# Patient Record
Sex: MALE | Race: WHITE | NOT HISPANIC OR LATINO | Employment: OTHER | URBAN - METROPOLITAN AREA
[De-identification: names, ages, dates, MRNs, and addresses within clinical notes are randomized per-mention and may not be internally consistent; named-entity substitution may affect disease eponyms.]

---

## 2020-01-29 ENCOUNTER — TRANSFERRED RECORDS (OUTPATIENT)
Dept: HEALTH INFORMATION MANAGEMENT | Facility: CLINIC | Age: 40
End: 2020-01-29

## 2021-03-17 ENCOUNTER — TRANSFERRED RECORDS (OUTPATIENT)
Dept: HEALTH INFORMATION MANAGEMENT | Facility: CLINIC | Age: 41
End: 2021-03-17

## 2021-06-18 ENCOUNTER — TRANSFERRED RECORDS (OUTPATIENT)
Dept: HEALTH INFORMATION MANAGEMENT | Facility: CLINIC | Age: 41
End: 2021-06-18

## 2021-07-01 ENCOUNTER — HOSPITAL ENCOUNTER (INPATIENT)
Dept: BEHAVIORAL HEALTH | Facility: CLINIC | Age: 41
LOS: 19 days | Discharge: HOME | DRG: 882 | End: 2021-07-20
Attending: PSYCHIATRY & NEUROLOGY | Admitting: PSYCHIATRY & NEUROLOGY
Payer: MEDICARE

## 2021-07-01 ENCOUNTER — TRANSFERRED RECORDS (OUTPATIENT)
Dept: HEALTH INFORMATION MANAGEMENT | Facility: CLINIC | Age: 41
End: 2021-07-01

## 2021-07-01 DIAGNOSIS — F42.2 MIXED OBSESSIONAL THOUGHTS AND ACTS: Primary | ICD-10-CM

## 2021-07-01 PROCEDURE — 999N001212 HC REV CODE 9999 CHARGE CONVERSION OPNP

## 2021-07-01 RX ORDER — CLOMIPRAMINE HYDROCHLORIDE 50 MG/1
50 CAPSULE ORAL 2 TIMES DAILY
Status: SHIPPED | COMMUNITY
Start: 2021-07-01 | End: 2021-07-20

## 2021-07-01 RX ORDER — PHENOL 1.4 %
1 AEROSOL, SPRAY (ML) MUCOUS MEMBRANE
Status: SHIPPED | COMMUNITY
Start: 2021-07-01 | End: 2021-07-13

## 2021-07-01 RX ORDER — PRAZOSIN HYDROCHLORIDE 1 MG/1
1 CAPSULE ORAL 2 TIMES DAILY
Status: SHIPPED | COMMUNITY
Start: 2021-07-01 | End: 2021-07-20

## 2021-07-01 RX ORDER — CYPROHEPTADINE HYDROCHLORIDE 4 MG/1
4 TABLET ORAL EVERY 4 HOURS PRN
Status: SHIPPED | COMMUNITY
Start: 2021-07-01 | End: 2021-07-20

## 2021-07-01 ASSESSMENT — MIFFLIN-ST. JEOR: SCORE: 1797.11

## 2021-07-02 ENCOUNTER — COMMUNICATION - HEALTHEAST (OUTPATIENT)
Dept: SCHEDULING | Facility: CLINIC | Age: 41
End: 2021-07-02

## 2021-07-02 PROCEDURE — 999N001212 HC REV CODE 9999 CHARGE CONVERSION OPNP

## 2021-07-03 PROCEDURE — 999N001212 HC REV CODE 9999 CHARGE CONVERSION OPNP

## 2021-07-04 ENCOUNTER — COMMUNICATION - HEALTHEAST (OUTPATIENT)
Dept: SCHEDULING | Facility: CLINIC | Age: 41
End: 2021-07-04

## 2021-07-04 PROCEDURE — 999N001212 HC REV CODE 9999 CHARGE CONVERSION OPNP

## 2021-07-04 NOTE — PLAN OF CARE
Plan of Care by Kely Morrell RN at 7/3/2021 10:23 AM     Author: Kely Morrell RN Service: -- Author Type: Registered Nurse    Filed: 7/3/2021 12:43 PM Date of Service: 7/3/2021 10:23 AM Status: Signed    : Kely Morrell RN (Registered Nurse)         Problem: Pain  Goal: Patient's pain/discomfort is manageable  Outcome: Progressing     Problem: Safety  Goal: Patient will be injury free during hospitalization  Outcome: Progressing     Problem: Daily Care  Goal: Daily care needs are met  Outcome: Progressing     Problem: Psychosocial Needs  Goal: Demonstrates ability to cope with hospitalization/illness  Outcome: Progressing     Patient alert and cooperative; guarded but pleasant on approach. Anxiety 5/10 and depression 4/10; denies SI/SIB, HI, AH/VH; contracts for safety. Isolative to room except for meals. First dose of prestiq given this am; pt very anxious about this; spoke with mother on phone and later felt more comfortable. Pt resting in bed at this time. Will continue to monitor.  Kely Morrell RN

## 2021-07-04 NOTE — CONSULTS
Consults by Edward Hernandez MD at 2021  3:13 PM     Author: Edward Hernandez MD Service: Hospitalist Author Type: Physician    Filed: 7/3/2021  2:50 PM Date of Service: 2021  3:13 PM Status: Addendum    : Edward Hernandez MD (Physician)    Related Notes: Original Note by Edward Hernandez MD (Physician) filed at 2021  3:22 PM     Consult Orders    1. Inpatient consult to Hospitalist Reason for Consult? Elevated BP; Did you contact the consulting MD? No; Consult priority: Today (routine); Communication for MD: No phone communication necessary for now [[id number]] ordered by Amber Tyson MD at 21 0940             Internal Medicine Consultation   Glenroy Awan,  1980, MRN [id number]    [facility]  Anxiety     PCP: Arabella Rondon MD, Tel [phone]   Code status:  Full Code       Extended Emergency Contact Information  Primary Emergency Contact: ADRIANO AWAN  Mobile Phone: [phone]  Relation: Mother    Requesting Provider: Amber Tyson     Assessment and Plan   Glenroy Awan is a 41 y.o. male with a history of anxiety, OCD and PTSD admitted to inpatient psych service for decompensation of his mental illness.  [facility] [place] for elevated blood pressure.     Elevated blood pressure without the diagnosis of hypertension: On admission, patient had elevated blood pressure to 174/98.  - No indication for long-term antihypertensive medication at this time. Monitor blood pressure.  Hydralazine as needed for now.    [facility] will continue to follow.     Chief Complaint:  Elevated blood pressure     HPI:    Glenroy Awan is a 41 y.o. old male with a history of anxiety, OCD and PTSD admitted to inpatient psych service for decompensation of his mental illness.  [facility] [place] consulted for elevated blood pressure.  On admission, patient had elevated blood pressure to 174/98.  Patient reports that he does not have history of hypertension.  He  "reports that his blood pressure would elevate when he goes to the clinic or hospital.  His blood pressure would also elevate when he is nervous or in anxiety.  He states that his blood pressure would return to normal once he calms down.  He reports no history of heart disease.  No family history of hypertension.     Medical History  Anxiety  PTSD  OCD   Surgical History  He  has no past surgical history on file.       Social History  Reviewed, and he  reports that he has never smoked. He has never used smokeless tobacco. He reports that he does not drink alcohol or use drugs.       Allergies  No Known Allergies Family History  Reviewed.  No family history of hypertension        Prior to Admission Medications   Medications Prior to Admission   Medication Sig Dispense Refill Last Dose   ? clomiPRAMINE (ANAFRANIL) 50 MG capsule Take 50 mg by mouth 2 (two) times a day.      ? cyproheptadine (PERIACTIN) 4 mg tablet Take 4 mg by mouth every 4 (four) hours as needed ((max 4 tablets per day)).      ? melatonin 10 mg Tab Take 1 tablet by mouth at bedtime as needed. (Nature's Bounty Sleep 3 Supplement)      ? prazosin (MINIPRESS) 1 MG capsule Take 1 mg by mouth 2 (two) times a day.             Review of Systems:  A 12 point comprehensive review of systems was negative except as noted. Physical Exam:  Temp:  [97.7  F (36.5  C)-98.4  F (36.9  C)] 97.7  F (36.5  C)  Heart Rate:  [103-112] 109  Resp:  [16-18] 16  BP: (139-184)/() 174/98    BP (!) 174/98 (Patient Position: Standing)   Pulse (!) 109   Temp 97.7  F (36.5  C) (Oral)   Resp 16   Ht 5' 9\" (1.753 m)   Wt 201 lb (91.2 kg)   SpO2 100%   BMI 29.68 kg/m      General Appearance:   Not in acute distress   Head:    Normocephalic, without obvious abnormality   Eyes:    PERRL, conjunctiva/corneas clear, EOM's intact       Neck:   Supple, symmetrical, trachea midline           Back:     Symmetric, no curvature, ROM normal, no CVA tenderness   Lungs:    Clear breath " sounds in bilateral lung fields       Heart:    Regular rate and rhythm, S1 and S2 normal, no JVD, no edema   Abdomen:     Soft, non-tender, bowel sounds active all four quadrants       Musculoskeletal:   Extremities are warm and non-tender, atraumatic, no joint swelling or tenderness       Skin:   Skin color, texture, turgor normal, no rashes or lesions on exposed areas   Neurologic:  AAO ×3.  Cranial nerves II - XII normal.  Muscle strength and sensation to light touch are normal in all four extremities.        Pertinent Labs  Lab Results: personally reviewed.   Results from last 7 days   Lab Units 07/01/21  1028   LN-SODIUM mmol/L 142   LN-POTASSIUM mmol/L 4.3   LN-CHLORIDE mmol/L 107   LN-CO2 mmol/L 25   LN-BLOOD UREA NITROGEN mg/dL 12   LN-CREATININE mg/dL 0.80   LN-CALCIUM mg/dL 9.2   LN-ALBUMIN g/dL 4.2   LN-PROTEIN TOTAL g/dL 7.1   LN-BILIRUBIN TOTAL mg/dL 0.4   LN-ALKALINE PHOSPHATASE U/L 76   LN-ALT (SGPT) U/L <9   LN-AST (SGOT) U/L 11     Results from last 7 days   Lab Units 07/01/21  1028   LN-WHITE BLOOD CELL COUNT thou/uL 7.7   LN-HEMOGLOBIN g/dL 15.4   LN-HEMATOCRIT % 44.5   LN-PLATELET COUNT thou/uL 237   LN-NEUTROPHILS RELATIVE PERCENT % 79*   LN-MONOCYTES RELATIVE PERCENT % 7     Results from last 7 days   Lab Units 07/01/21  1028   LN-CREATINE KINASE TOTAL U/L 84       MOST RECENT A1c, Iron, TIBC, Coags, TFTs  No results found for: HGBA1C, INR, PTT  No results found for: IRON, TRANSFERRIN  No results found for: TSH, T3FREE, FREET4          [facility] Medicine Service  Edward Hernandez MD   Pager: [phone]

## 2021-07-04 NOTE — PLAN OF CARE
Plan of Care by Nii Ramon RN at 7/3/2021  5:12 AM     Author: Nii Ramon RN Service: -- Author Type: Registered Nurse    Filed: 7/3/2021  6:23 AM Date of Service: 7/3/2021  5:12 AM Status: Signed    : Nii Ramon RN (Registered Nurse)         Problem: Pain  Goal: Patient's pain/discomfort is manageable  Outcome: Progressing     Problem: Psychosocial Needs  Goal: Demonstrates ability to cope with hospitalization/illness  Outcome: Progressing     Problem: Safety  Goal: Patient will be injury free during hospitalization  Outcome: Progressing       Patient was observed sleeping through the night. Safety checks completed per unit policy. No concerns noted during this shift. He had greater than 6 hrs of sleep.

## 2021-07-04 NOTE — PROGRESS NOTES
Progress Notes by Marimar Garza, RN at 7/2/2021  2:52 PM     Author: Marimar Garza RN Service: -- Author Type: Registered Nurse    Filed: 7/2/2021  2:52 PM Date of Service: 7/2/2021  2:52 PM Status: Signed    : Marimar Garza RN (Registered Nurse)       Pt pleasant, calm and cooperative. Engages with peers. Denies SI. HI, SIB, visual and auditory hallucinations.  Contracts for safety. Endorsed depression at 3/10, anxiety 5/10, generalized pain from the previous fall 5/10. Pt meds complaint. Had a one time order for 1mg Ativan IM, administered on L deltoid. Pt has an abrasions on the forehead. Good foods and fluids. Pt remain free of falls on the unit. Will continue to monitor.

## 2021-07-04 NOTE — PLAN OF CARE
"Plan of Care by Jovanna Day SW at 7/2/2021  3:03 PM     Author: Jovanna Day SW Service: -- Author Type:     Filed: 7/2/2021  3:44 PM Date of Service: 7/2/2021  3:03 PM Status: Signed    : Jovanna Day SW ()         Initial Psychosocial Assessment    Type of CM visit: Initial Assessment, Clinical Treatment Coordinator Role Introduction, Offer Discharge Planning    Information obtained from: [x]Patient   [x]Chart review  [x]Collateral Contacts  []Court Website    Hospitalization information:   Glenroy Adler is a 41 y.o. who was admitted to unit 2800 on 7/1/2021 due to depression decreased functioning at home.     Patient Self-Assessment  Patient reported reason for admission: \"I had a fall at home, things have gotten worse mentally and physically, DHS changes are creating problems\".      Patient reported symptoms of concern: [x]sadness    [x]anxiety     []anger    [x]poor sleep     [x]medications not working    [x]racing thoughts     []substance use     []agitation     []hearing voices     [x]hopelessness   []Eating concerns    [x]Self-injury      [] Other   Comments:    Current suicidal ideation:  [x]No    []Yes, no plan     []Yes, with plan (describe):          Comments:   Current homicidal ideation:  [x]No   [] Yes       Comments:       History of Mental Health:  Describe current and past mental health symptoms present? Pt reports history of autism spectrum disorder anxiety, OCD. Patient denies any history of substance use disorders. Patient reports his [facility] assessment indicates he requies 24/7 care. Per patient's mother patient has been increasingly depressed, has not left his home in months and does not want to get out of bed in the morning. She reports when patient becomes overwhelmed he \"shuts down\" and has difficulty engaging with others.     Do you understand your mental health diagnosis? YES [x]   NO []    History of psychiatric hospitalizations?  " "YES [x]     NO  []  Details: [employer]: 2020,    If YES, within the last 30 days? YES []     NO  []    History of commitment?  YES []     NO  [x]    Details:   History of ECT?  YES []     NO  [x]    Details:     History of Substance Use Disorder:  Have you used alcohol or substances in the past 12 months? YES []/ NO [x]             Would you like a substance use disorder evaluation? YES [] / NO [x]  Previous Treatment? YES []/ NO [x]  Details:     Significant Life Events  (Illness, Death, Loss): Patient reports the loss of his grandmother due to ALS in 1992 was a traumatic experience and caused his depression . He also reports he has difficulty when losing his \"babies\" referring to his animals (ie fish).       Is there a history of abuse or psychological trauma:    [x]Denies       []Yes, present (type):         []Yes, past (type):        []Patient declined to answer    Identify current stressors:    [x]financial,    []legal issues,    []homelessness,    []housing,     []recent loss,    []relationships,    []substance use concerns,    []medical     []unemployment     []employment  concerns    []isolation,    []lack of resources,     []out of home placements,     []parenting issues     []domestic violence     [x]other:Comments: problems with services, lack of enough in home available.       Living Situation:     [x]House/apt    []Group Home    []IRTS     []Homeless     []Assisted Living     []Nursing home    []Lives alone    []Lives with :                         []Other:   Reports he is supposed to be getting 24/7 services     Family Composition:  Children, ages and current location if minor: Lives alone   Relationship status  [x]Single     []     []     []       []Significant Other   []Other:     Educational Background:  []Less Than High School     []High School     []GED     [x]College       Cognitive/learning concerns: Pt has ASD reports he tried to complete 4 years of college, was able to " "complete 2 yr degree    Financial Status: [] Employed, status and location:  []Unemployment    []County Assistance     [x]SSI/SSDI      []Waivered services    []Other:    Legal status(present):   [x]Voluntary, []72-hour hold, []Commitment, []Guardianship, []Revocation, []Stay of commitment,    Details:    Other legal issues identified:  [x]None, []Arrest,  []Probation/[place],  []Driving under influence,  []Incarceration,  []Sexual offense (level):   []Child Protective Services,      []Other:      Details:    Ethnic/Cultural considerations:  Pt is a single white man.     Spiritual considerations: Reports he \"flips between Taoist and agnostic\".     Service History:  [x]No     []Yes: details:    Social Functioning (organization, interests) and strengths: Patient lives in his own apartment with in home services. Pt reports his animals are very important to him and he plans to get guinea pigs.     Current Treatment Providers Are:     NO Name, Agency, and phone   Psychiatrist  [] YVONNE Flores with [facility]   Psychotherapist  [] Dr Jade Tyler at [facility] [phone]   [facility] worker  [x]      [] [facility]   Waivered Services  [] Marine Staffer with [facility] [phone]   [employer] [facility]  [x]    Day Treatment/PHP/[facility] trtmt  [x]    Group Home/AFC/AL  [x]      [x]    Other:  []  Cleo           Social Service Assessment of identified patient needs and plan to meet those needs:  Patient engaged in assessment with writer, presenting with pressured speech, somewhat agitated at times. Patient's thoughts were tangential. Patient reports he has been trying to get help fo rhis depression for awhile and had been told at [facility] by other residents that the only way to get help is to try and harm yourself, and so reports he made a non lethal gesture with a scissors a couple weeks ago. Patient reports his housing situation is a stressor as his in home " "services have been sporadic and his [facility] CM is working on getting a new agency.     Writer called and spoke with patient's mother Cleo who shared that patient's depression has been debilitating and she also believes he needs medication changes. Cleo reports patient does not like to be talked down to ie gets triggered when has been called \"ana\" in the past and can \"shut down\" when feeling overwhelmed. Cleo reports that when talking to patient last evening after he took a benzodiazepine she felt he was \"like his old self. Watching TV, he has not watched TV in months\". Writer explained to Cleo that we would coordinate with his  regarding discharge planning as well.     Writer called and provided update to KINGSLEY Rubio. Bib reports pts [facility] CM is working on getting another agency for in home services. Patient will require an intake assessment with this agency prior to starting services.       Possible discharge plan:  Return home with new in home services        Barriers: Medication Management, Symptom Stabilization, Coordination of Care         "

## 2021-07-04 NOTE — PROGRESS NOTES
Progress Notes by Suzanna Kaur OT at 7/3/2021  2:43 PM     Author: Suzanna Kaur OT Service: -- Author Type: Occupational Therapist    Filed: 7/3/2021  2:43 PM Date of Service: 7/3/2021  2:43 PM Status: Signed    : Suzanna Kaur OT (Occupational Therapist)          07/03/21 1443   Engagement   Intervention Group   Topic Detail OT Wellness-Processing Bingo for social engagement, healthy distraction, symptom management, focus and insight   Attendance Did not attend   Reason for Not Attending Refused  (declined personal invite)

## 2021-07-04 NOTE — PLAN OF CARE
Plan of Care by Kely Morrell RN at 7/3/2021  2:41 PM     Author: Kely Morrell RN Service: -- Author Type: Registered Nurse    Filed: 7/3/2021  3:01 PM Date of Service: 7/3/2021  2:41 PM Status: Addendum    : Kely Morrell RN (Registered Nurse)    Related Notes: Original Note by Kely Morrell RN (Registered Nurse) filed at 7/3/2021  2:45 PM       Patient more anxious and irritable as shift progressed. Hyperverbal speech and reporting an increase in OCD behaviors such as checking his room over and over again. Difficult to understand patient at times due to rate of speech. Writer provided emotional support and administered scheduled ativan which he states is helpful. Pt currently using the phone in the lounge where he is noted to be crying. Will continue to monitor.  Kely Morrell RN

## 2021-07-04 NOTE — PLAN OF CARE
Plan of Care by Orlin Mejia RN at 7/3/2021  6:12 PM     Author: Orlin Mejia RN Service: -- Author Type: Registered Nurse    Filed: 7/3/2021  6:13 PM Date of Service: 7/3/2021  6:12 PM Status: Signed    : Orlin Mejia RN (Registered Nurse)       Problem: Pain  Goal: Patient's pain/discomfort is manageable  Outcome: Progressing: Pt denied pain throughout this shift.      Problem: Safety  Goal: Patient will be injury free during hospitalization  Outcome: Progressing     Problem: Psychosocial Needs  Goal: Demonstrates ability to cope with hospitalization/illness  Outcome: Progressing     Problem: Daily Care  Goal: Daily care needs are met  Outcome: Not Progressing: Pt refuses to take shower when encouraged.     Pt observed to have flat and anxious affect. Pt's behavior observed as cooperative and pleasant. Pt was visible on the unit watching movie with peers and out for meals. Pt did not attend groups today. Pt social with select peers but mainly keeps to themselves. Pt was A/O and thought content observed to be clear and organized. Speech was appropriate. Pt ate 100% of dinner with 480 ml. Pt rated depression 3/10, anxiety 4/10, and denied pain. No PRNs given. Pt denied having SI or HI. Pt denied having hallucinations and all other psych symptoms. Pt contracted for safety.    No precautions ordered at this time.

## 2021-07-04 NOTE — H&P
"H&P by Amber Tyson MD at 7/2/2021  9:46 AM     Author: Amber Tyson MD Service: Psychiatry Author Type: Physician    Filed: 7/2/2021  3:28 PM Date of Service: 7/2/2021  9:46 AM Status: Signed    : Amber Tyson MD (Physician)       PSYCHIATRY   HISTORY AND PHYSICAL     DATE OF SERVICE   7/2/2021         CHIEF COMPLAINT   \" I am extremely anxious.\"       HISTORY OF PRESENT ILLNESS   This is a 41 y.o. male with history of OCD (obsessive compulsive disorder), patient is single, with no children domiciled by him self in a town home but he has a care team from a private company, patient is on disability; that was admitted to the psychiatric inpatient unit due to severe depression and inability to take care of himself due to multiple recent medication changes.    Today patient was seen and evaluated in the consult room with nurse practitioner present during the assessment, this was a face-to-face evaluation.  Patient presented during the assessment extremely anxious, perseverating on his medications and I did again not able to provide accurate information.  His thought process is very concrete and he clearly has multiple obsessions.  Patient was perseverating on him not receiving his clomipramine and prazosin last night.  I informed the patient that prior to starting any medications we need to verify with the pharmacy and I always like to meet with the patient is to make sure that the medications the pharmacy reviewed are correct.  At the end patient verbalized good understanding and was in agreement with proceeding with the assessment.    Patient said that he had a huge fall because one of the medications that he is outpatient provider prescribed.  He was supposed to take this medication as needed for anxiety (Periactin).  Last Monday at 6 in the morning was when he had his fall and patient thinks that the new medication \"screwed up with the melatonin\". He was drowsy, foggy, got " "up and he fell down the stairs.  Patient tells me that he agreed to come to the hospital because he needs some to do a thorough look at he is medications.  Patient was extremely anxious at the moment of the assessment and he was unable to fully talk about his symptoms and how he has been feeling at home.  Patient did reported that he is nervous, anxious, overlwhelmed, scared because he wants things to get better.  Patient verbalized having problems with anxiety and depression. Ever since the accident last Monday this has been worst. Has memory problems, sensory problems, concentration is poor and he even said that he has problems understanding the suzanne here in the unit.  Patient was very upset about not taking the clomipramine this morning but at the same time patient tells me that he doesn't think the Clomipramine has been helpful, and the outpatient psychiatrist has been thinking about taking it away.  Patient said that over the last few days the only medication that was helpful was the lorazepam that he received in the emergency room.  He has developed bad side effects to the hydroxyzine and does not want to take this medication anymore.  At this time patient denies having any thoughts about harming himself or harming others and he has been able to contract for safety.  Denies having any hallucinations and no delusions have been elicited.     When talking about medications patient tells me that he has to stay away form all selective serotonin reuptake inhibitor because they cause more harm.  He also has to stay away form antipsychotics, they do nothing and \"screw him up\" (EPS?).  Patient was not able to give me a list of prior medications use.he  believed that he took Abilify and this medication was not beneficial..     I was able to communicate with patient's mother Cleo over the phone with patient present during the interview.  Mother was also very upset about the patient not being started on clomipramine " last night.  I have informed the mother the reasons why this was not done.  First I needed for the pharmacy to review the medication and I needed to review the medication myself with the patient.  Mother was also upset about the staff in the evening and she was constantly asking if we do have nurses working in the unit.  I did confirmed with the mother that we do have a nurse working with the patient on every shift.      Mother reported that the prazosin and the antyhistamine cause more problems and made the patient very anxious. Lorazepam has been the best medication for him, it calms him down, and he is able to function.  Lorazepam is not causing any side effects. The hesitaiton with his outpatient provider to give him this medication is because is related to valium and they do not want to put him on anything that is addictive.  Patient was very used to take Valium as was dependant on it. In 2019 the outpatient providers started to taper down the Valium. Valium was completely discontinued on May 23, 2021.  Mother thinks that after the discontinuation of the Valium the anxiety and the panic started coming forward.  In addition to all of this patient has been experiencing problems with staffing.  There have been a lot of changes for the patient at home.  He does not have the same staff working with him consistently, this has caused a lot of anxiety and has increased patient's obsessive thinking.  Mother reported that he currently the patient doesn't want to get out of bed, is having panick attacks and doesn't want to face the problems with the staff. He can't prepare food for him self, has not shower in a month and half and he is unable to leave his house.     At that moment I discussed with the patient and mother the tentative treatment plan.  First I have placed a consult for the hospitalist to come and see the patient as his blood pressure has been elevated.  Second I have sent a message to the psychologist in  order to work with the patient in creating a behavioral plan here in the unit..  I will communicate with the outpatient provider and coordinate cares with her.  In the meantime given that both patient and mother reported that the patient has done well on lorazepam and I will put him on lorazepam 0.5 mg 3 times a day.  Both mother and patient have verbalized good understanding and they are in agreement with this plan.    After I spoke with her outpatient providers I went back to the unit and met with the patient.  I reviewed with the patient the information that he was provided by the outpatient provider.  I discussed with the patient the use of Pristiq including reasons for prescribing this, benefits, risk and side effects.  After clarifying patient's questions he informed me is in agreement with a trial of Pristiq.    As per outpatient provider:   Patient has 2 different records in care everywhere and he has 2 different addresses.    Outpatient provider Rosanna Flores informed me that patient has been experiencing problems with the ability to cope with live, change and his anxiety. Valium was discontinue and the patient tolerated this well. More recently the problems is staffing. Staff has been quiting and he has been without staff.  Patient had an [facility] assessment that said that he needs 24/7 assistance.  This has led to more panic, patient making coments to wanting to kill him self because he wants to have people around him.  This cycle that is unbreakable at this moment.  The anxiety is not undercontrol.  Mark informed me that she wanted to take him off Clomipramine as this has not work to treat his anxiety, but the patient is not willing to change the medication at this time or increase the dose of clonazepam. Patient is convinced any other medication will cause problems.  In the past when the patient gets a new medication he asked for the staff that works with him to google the medication for side  effects for the parents will do that for him.  He had [facility] assessment done years ago.  Outpatient provider thinks that the patient can benefit from a trial of Pristiq.  She will be faxing the [facility] assessment and her last note.    Later on I did receive both and indeed in the [facility] assessment it said that Pristiq is a medication that can be tried on the patient.  On her progress note said that the patient has tried Prozac, Lexapro, Celexa, Remeron, trazodone, temazepam, clomipramine, Abilify, Seroquel, Zyprexa, Depakote, diazepam, lorazepam, hydroxyzine, clonidine, guanfacine, propanolol, propanolol LA, melatonin, Benadryl and l-methylfolate.       CHEMICAL DEPENDENCY HISTORY   Social History     Substance and Sexual Activity   Drug Use Never       Social History     Substance and Sexual Activity   Alcohol Use Never   ? Frequency: Never       Social History     Tobacco Use   Smoking Status Never Smoker   Smokeless Tobacco Never Used       Treatment: Denies  Detox: Denies   Legal: Denies       PAST PSYCHIATRIC HISTORY   Psychiatrist: He follows with Rosanna Tavera at [place] telephone number .  Therapist: Jade Arredondo Psychologist  Hospitalizations: According to the patient he has been admitted to psychiatric inpatient unit in the past.  Last admission was at [facility] in 2016.  Patient does not remember how many times he has been admitted to psychiatry.  According to the patient he has been diagnosed autism spectrum disorder, OCD and PTSD.    History of Commitment: Was committed 5 years ago.   Past Medications: See above  ECT:  no  Suicide Attempts/Gun Access: Patient has tried to commit suicide in the past.  Denies any access to guns.  Community Supports: Parents and outpatient supports.  Patient also has a .       PAST MEDICAL HISTORY   History reviewed. No pertinent past medical history.    History reviewed. No pertinent surgical history.    Primary Care Provider:  Arabella Rondon MD  Medications:   ? [START ON 7/3/2021] clomiPRAMINE  25 mg Oral DAILY   ? clomiPRAMINE  50 mg Oral QHS   ? [START ON 7/3/2021] desvenlafaxine succinate  25 mg Oral DAILY   ? LORazepam  0.5 mg Oral TID   ? prazosin  1 mg Oral BID     Medications as needed: acetaminophen, aluminum-magnesium hydroxide-simethicone, melatonin, polyethylene glycol    ALLERGIES: Patient has no known allergies.       MEDICATIONS   Current Facility-Administered Medications   Medication Dose Route Frequency Provider Last Rate Last Admin   ? acetaminophen tablet 650 mg (TYLENOL)  650 mg Oral Q4H PRN Amber Tyson MD       ? aluminum-magnesium hydroxide-simethicone 200-200-20 mg/5 mL suspension 30 mL (MAALOX ADVANCED)  30 mL Oral Q4H PRN Amber Tyson MD       ? [START ON 7/3/2021] clomiPRAMINE capsule 25 mg (ANAFRANIL)  25 mg Oral DAILY Amber Tyson MD       ? clomiPRAMINE capsule 50 mg (ANAFRANIL)  50 mg Oral QHS Amber Tyson MD       ? [START ON 7/3/2021] desvenlafaxine succinate Tb24 25 mg  25 mg Oral DAILY Amber Tyson MD       ? LORazepam tablet 0.5 mg (ATIVAN)  0.5 mg Oral TID Amber Tyson MD       ? melatonin tablet 10 mg  10 mg Oral Bedtime PRN Amber Tyson MD       ? polyethylene glycol packet 17 g (MIRALAX)  17 g Oral Daily PRN Amber Tyson MD       ? prazosin capsule 1 mg (MINIPRESS)  1 mg Oral BID Amber Tyson MD           Medication adherence: Reviewed risk/benefits of medication   Medication side effects: none  Benefit: yes       ROS   A 12 point comprehensive review of systems was negative except as noted.       FAMILY HISTORY   No family history on file.     Psychiatric: Denies  Chemical: Denies  Suicide: Cousin committed suicide and according to the patient this was related to the use of an antidepressant.       SOCIAL HISTORY   Social History     Socioeconomic History   ? Marital status: Single      Spouse name: Not on file   ? Number of children: Not on file   ? Years of education: Not on file   ? Highest education level: Not on file   Occupational History   ? Not on file   Social Needs   ? Financial resource strain: Not on file   ? Food insecurity     Worry: Not on file     Inability: Not on file   ? Transportation needs     Medical: Not on file     Non-medical: Not on file   Tobacco Use   ? Smoking status: Never Smoker   ? Smokeless tobacco: Never Used   Substance and Sexual Activity   ? Alcohol use: Never     Frequency: Never   ? Drug use: Never   ? Sexual activity: Not Currently   Lifestyle   ? Physical activity     Days per week: Not on file     Minutes per session: Not on file   ? Stress: Not on file   Relationships   ? Social connections     Talks on phone: Not on file     Gets together: Not on file     Attends [employer] service: Not on file     Active member of club or organization: Not on file     Attends meetings of clubs or organizations: Not on file     Relationship status: Not on file   ? Intimate partner violence     Fear of current or ex partner: Not on file     Emotionally abused: Not on file     Physically abused: Not on file     Forced sexual activity: Not on file   Other Topics Concern   ? Not on file   Social History Narrative   ? Not on file       Born and Raised: [place]  Occupation: On disability  Marital Status: Single  Children: No children  Living Situation: Living arrangements -domiciled at home with supports       MENTAL STATUS EXAM   Appearance:  Dirty/stained clothing, Disheveled and Malodorous  Mood:  anxious  Affect: anxious, depressed  Suicidal Ideation: absent  Homicidal Ideation: absent  Thought process: concrete  Thought content: Obsession  Fund of Knowledge: Limited  Attention/Concentration: limited  Language ability: intact, speech is pressured  Memory: recent and remote memory intact  Insight and Judgement: limited  Orientation: person, place, time and  "situation  Psychomotor Behavior: has a tremor, is restless.  Tremor is more pronounced in the left hand.  Muscle Strength and Tone: normal  Gait and Station: normal gait and station       PHYSICAL EXAM   Vitals: BP (!) 174/98 (Patient Position: Standing)   Pulse (!) 109   Temp 97.7  F (36.5  C) (Oral)   Resp 16   Ht 5' 9\" (1.753 m)   Wt 201 lb (91.2 kg)   SpO2 100%   BMI 29.68 kg/m      Physical exam physical exam by the hospitalist remains pending.  Consult has been placed.       LABS   personally reviewed.   Admission on 07/01/2021, Discharged on 07/01/2021   Component Date Value   ? Sodium 07/01/2021 142    ? Potassium 07/01/2021 4.3    ? Chloride 07/01/2021 107    ? CO2 07/01/2021 25    ? Anion Gap, Calculation 07/01/2021 10    ? Glucose 07/01/2021 110    ? Calcium 07/01/2021 9.2    ? BUN 07/01/2021 12    ? Creatinine 07/01/2021 0.80    ? GFR MDRD Af Amer 07/01/2021 >60    ? GFR MDRD Non Af Amer 07/01/2021 >60    ? Bilirubin, Total 07/01/2021 0.4    ? Bilirubin, Direct 07/01/2021 0.1    ? Protein, Total 07/01/2021 7.1    ? Albumin 07/01/2021 4.2    ? Alkaline Phosphatase 07/01/2021 76    ? AST 07/01/2021 11    ? ALT 07/01/2021 <9    ? CK, Total 07/01/2021 84    ? Alcohol, Blood 07/01/2021 <10    ? Amphetamines 07/01/2021 Screen Negative    ? Benzodiazepines 07/01/2021 Screen Negative    ? Opiates 07/01/2021 Screen Negative    ? Phencyclidine 07/01/2021 Screen Negative    ? THC 07/01/2021 Screen Negative    ? Barbiturates 07/01/2021 Screen Negative    ? Cocaine Metabolite 07/01/2021 Screen Negative    ? Methadone 07/01/2021 Screen Negative    ? Oxycodone 07/01/2021 Screen Negative    ? Creatinine, Urine 07/01/2021 133.8    ? WBC 07/01/2021 7.7    ? RBC 07/01/2021 5.34    ? Hemoglobin 07/01/2021 15.4    ? Hematocrit 07/01/2021 44.5    ? MCV 07/01/2021 83    ? MCH 07/01/2021 28.8    ? MCHC 07/01/2021 34.6    ? RDW 07/01/2021 13.0    ? Platelets 07/01/2021 237    ? MPV 07/01/2021 8.4*   ? Neutrophils % " 07/01/2021 79*   ? Lymphocytes % 07/01/2021 13*   ? Monocytes % 07/01/2021 7    ? Eosinophils % 07/01/2021 0    ? Basophils % 07/01/2021 0    ? Immature Granulocyte % 07/01/2021 0    ? Neutrophils Absolute 07/01/2021 6.0    ? Lymphocytes Absolute 07/01/2021 1.0    ? Monocytes Absolute 07/01/2021 0.6    ? Eosinophils Absolute 07/01/2021 0.0    ? Basophils Absolute 07/01/2021 0.0    ? Immature Granulocyte Abs* 07/01/2021 0.0    ? Color, UA 07/01/2021 Light Yellow    ? Clarity, UA 07/01/2021 Clear    ? Glucose, UA 07/01/2021 Negative    ? Protein, UA 07/01/2021 Negative    ? Bilirubin, UA 07/01/2021 Negative    ? Urobilinogen, UA 07/01/2021 <2.0 mg/dL    ? pH, UA 07/01/2021 7.0    ? Blood, UA 07/01/2021 1.0 mg/dL*   ? Ketones, UA 07/01/2021 Negative    ? Nitrite, UA 07/01/2021 Negative    ? Leukocytes, UA 07/01/2021 Negative    ? Specific Gravity, UA 07/01/2021 1.018    ? RBC, UA 07/01/2021 138*   ? WBC UA 07/01/2021 2    ? Bacteria, UA 07/01/2021 None Seen    ? Squamous Epithel, UA 07/01/2021 0    ? Mucus, UA 07/01/2021 Present*   ? SARS-CoV-2 PCR Result 07/01/2021 Negative       No results found for: PHENYTOIN, PHENOBARB, VALPROATE, CBMZ       ASSESSMENT   This is a 41 y.o. male with history of OCD (obsessive compulsive disorder), patient is single, with no children domiciled by him self in a town home but he has a care team from a private company, patient is on disability; that was admitted to the psychiatric inpatient unit due to severe depression and inability to take care of himself due to multiple recent medication changes.  At this time patient is in agreement with the admission and making adjustments on his medications.       DIAGNOSIS   Principal Problem:    OCD (obsessive compulsive disorder)    Active Problem List:  Patient Active Problem List   Diagnosis   ? OCD (obsessive compulsive disorder)   ? Autism spectrum disorder   ? Panic disorder with agoraphobia   ? PTSD (post-traumatic stress disorder)           PLAN   1. Education given regarding diagnostic and treatment options with risks, benefits and alternatives and adequate verbalization of understanding.  2. Admitted 2800  3. Medications: PTA medications reviewed.    Decrease clomipramine to 25 mg in the morning and 50 mg at bedtime at the same time cross-taper with Pristiq 25 mg daily.    Start Ativan 0.5 mg 3 times a day for the treatment of anxiety.    Continue prazosin 1 mg twice daily.  Patient has been fully educated about the reasons for prescribing these medications, benefits, risk and side effects.  For now the patient agrees to continue with the prazosin.  4. Consultations:    Hospitalist following the patient.    Psychology consult in place.  Patient has been reviewed fully with the psychologist that will meet with the patient on Monday.    Occupational Therapy consult in place for a sensory evaluation.  5.  is following in regards to collecting and reviewing collateral information, referrals and disposition planning.    . Further treatment programming to be determined throughout the hospital course.    Risk Assessment: Patient able to contract for safety    This note was created with help of Dragon dictation system. Grammatical / typing errors are not intentional.    Amber Tyson MD       CERTIFICATION     Initial Certification I certify that the inpatient psychiatric facility admission was medically necessary for treatment which could reasonably be expected to improve the patients condition.        I estimate 7 days of hospitalization is necessary for proper treatment of the patient. My plans for post-hospital care this patient are ;: Home with follow-up     Amber Tyson MD     -     7/2/2021     -     9:46 AM

## 2021-07-04 NOTE — PROGRESS NOTES
Progress Notes by Jeanne Alonso SW at 7/2/2021  3:28 PM     Author: Jeanne Alonso SW Service: -- Author Type:     Filed: 7/2/2021  3:28 PM Date of Service: 7/2/2021  3:28 PM Status: Signed    : Jeanne Alonso SW ()          07/02/21 1527   Engagement   Intervention Group   Topic Recovery planning;Symptom management;Wellness strategies   Topic Detail Process Group: Healthy Coping Skills   Attendance Did not attend

## 2021-07-04 NOTE — PLAN OF CARE
Plan of Care by Suzanna Kaur OT at 7/3/2021  3:27 PM     Author: Suzanna Kaur OT Service: -- Author Type: Occupational Therapist    Filed: 7/3/2021  3:29 PM Date of Service: 7/3/2021  3:27 PM Status: Signed    : Suzanna Kaur OT (Occupational Therapist)       Patient in bed on approach. Provided patient with education for sensory evaluation/coping skills. Pt shared he doesn't think sensory tools have helped him previously. Pt shared he benefits from rest. Supplied pt with coping skills/calming techniques handout. Pt agreeable to review and follow up with OT at a later date.    OT to follow up with patient on Monday.    Suzanna Kaur OT  07/03/21

## 2021-07-04 NOTE — PLAN OF CARE
Plan of Care by Suzanna Kaur OT at 7/2/2021 11:29 AM     Author: Suzanna Kaur OT Service: -- Author Type: Occupational Therapist    Filed: 7/2/2021 11:31 AM Date of Service: 7/2/2021 11:29 AM Status: Signed    : Suzanna Kaur OT (Occupational Therapist)       Occupational Therapy   AIDET      Patient was introduced to the role of occupational therapy and oriented to the process of occupational therapy services on the unit, including function of groups. Patient was given the Occupational Therapy Questionnaire to complete. Patient in lounge on approach. Pt agreeable to complete form and follow up with OT at a later date. Pt shared he enjoys coloring lately, especially fish. OT will follow up with assessment and address any questions, needs, or concerns.    Therapist: Suzanna Kaur OT  7/2/2021    Care plan initiated for patient who admitted on 7/1/2021. Occupational therapy will engage patient as appropriate, providing invitation to groups and encouraging active participation.  Formal assessment to be completed next week.    Suzanna Kaur OT  07/02/21

## 2021-07-04 NOTE — PROGRESS NOTES
Progress Notes by Jeanne Alonso SW at 7/2/2021  3:27 PM     Author: Jeanne Alonso SW Service: -- Author Type:     Filed: 7/2/2021  3:28 PM Date of Service: 7/2/2021  3:27 PM Status: Signed    : Jeanne Alonso SW ()          07/02/21 1527   Engagement   Intervention Group   Topic Recovery planning;Symptom management;Wellness strategies   Topic Detail Process Group: Healthy Coping Skills   Attendance Attended

## 2021-07-04 NOTE — PROGRESS NOTES
Progress Notes by Edward Hernandez MD at 7/3/2021  2:39 PM     Author: Edward Hernandez MD Service: Hospitalist Author Type: Physician    Filed: 7/3/2021  2:49 PM Date of Service: 7/3/2021  2:39 PM Status: Signed    : Edward Hernandez MD (Physician)       Hospitalist Progress Note    Assessment/Plan    Gelnroy Adler is a 41 y.o. male with a history of anxiety, OCD and PTSD admitted to inpatient psych service for decompensation of his mental illness.  [facility] [place] elevated blood pressure.      Elevated blood pressure without the diagnosis of hypertension: Elevated blood pressure at the level of 140s/90s most of the time and it is situational. Patient currently on prazosin 1 mg two times a day. No indication to start new antihypertensive medication at this time. Continue to monitor blood pressure.  Hydralazine as needed if blood pressure elevated over 160/100.     No further recommendation from [facility] standpoint.  [facility] will sign off.    Subjective  Patient reports feeling well. He has no specific concerns. Patient's blood pressure was good after admission yesterday with readings 156/88, 137/89 and 116/72. It was 142/92 this morning. We discussed about his elevated blood pressure. He states that his elevated blood pressure is situational and it often happens when he is anxious. When he calms down, his blood pressure would improve. He also started prazosin about one week ago with 1 mg daily. It was just increased to 1 mg two times a day yesterday. He does not want to start other medication for his blood pressure.     Objective    Vital signs in last 24 hours  Temp:  [97.4  F (36.3  C)-97.8  F (36.6  C)] 97.4  F (36.3  C)  Heart Rate:  [100-108] 108  Resp:  [16] 16  BP: (116-156)/(72-98) 142/92 99% O2 Device: None (Room air)    Weight:   202 lb 9 oz (91.9 kg) Weight change:     Intake/Output last 3 shifts  I/O last 3 completed shifts:  In: 1320 [P.O.:1320]  Out: -   Body mass index is 29.91  kg/m .    Physical Exam    General appearance: not in acute distress  HEENT: PERRL, EOMI  Lungs: Clear breath sounds in bilateral lung fields  Cardiovascular: Regular rate and rhythm, normal S1-S2  Musculoskeletal: No joint swelling  Skin: No rash and no edema  Neurology: AAO ×3.  Cranial nerves II - XII normal.  Normal muscle strength in all four extremities.      Pertinent Labs   Lab Results: personally reviewed.   Results from last 7 days   Lab Units 07/01/21  1028   LN-SODIUM mmol/L 142   LN-POTASSIUM mmol/L 4.3   LN-CHLORIDE mmol/L 107   LN-CO2 mmol/L 25   LN-BLOOD UREA NITROGEN mg/dL 12   LN-CREATININE mg/dL 0.80   LN-CALCIUM mg/dL 9.2   LN-ALBUMIN g/dL 4.2   LN-PROTEIN TOTAL g/dL 7.1   LN-BILIRUBIN TOTAL mg/dL 0.4   LN-ALKALINE PHOSPHATASE U/L 76   LN-ALT (SGPT) U/L <9   LN-AST (SGOT) U/L 11     Results from last 7 days   Lab Units 07/01/21  1028   LN-WHITE BLOOD CELL COUNT thou/uL 7.7   LN-HEMOGLOBIN g/dL 15.4   LN-HEMATOCRIT % 44.5   LN-PLATELET COUNT thou/uL 237   LN-NEUTROPHILS RELATIVE PERCENT % 79*   LN-MONOCYTES RELATIVE PERCENT % 7     Results from last 7 days   Lab Units 07/01/21  1028   LN-CREATINE KINASE TOTAL U/L 84           Medications  Scheduled Meds:  ? clomiPRAMINE  25 mg Oral DAILY   ? clomiPRAMINE  50 mg Oral QHS   ? desvenlafaxine succinate  25 mg Oral DAILY   ? LORazepam  0.5 mg Oral TID   ? prazosin  1 mg Oral BID     Continuous Infusions:  PRN Meds:.acetaminophen, aluminum-magnesium hydroxide-simethicone, hydrALAZINE, melatonin, polyethylene glycol        [facility] Medicine Service  Edward Hernandez MD

## 2021-07-04 NOTE — PLAN OF CARE
Plan of Care by Driss Shelby RN at 7/2/2021 10:50 PM     Author: Driss Shelby RN Service: -- Author Type: Registered Nurse    Filed: 7/2/2021 10:50 PM Date of Service: 7/2/2021 10:50 PM Status: Signed    : Driss Shelby RN (Registered Nurse)       Alert and oriented x4, able to communicate needs. Pleasant, cooperative and compliant with medication. Visible in milieu, social and engaged  with peers and staff members. Anxiety and depression rated at 2, denied SI/HI, contracted for safety. He denied any pain or discomfort.   Problem: Pain  Goal: Patient's pain/discomfort is manageable  Outcome: Progressing     Problem: Safety  Goal: Patient will be injury free during hospitalization  Outcome: Progressing     Problem: Daily Care  Goal: Daily care needs are met  Outcome: Progressing     Problem: Psychosocial Needs  Goal: Demonstrates ability to cope with hospitalization/illness  Outcome: Progressing

## 2021-07-04 NOTE — PROGRESS NOTES
Progress Notes by Gi Pennington PharmD at 7/1/2021  8:35 PM     Author: Pennington, Gi, PharmD Service: Pharmacy Author Type: Pharmacist    Filed: 7/1/2021  8:40 PM Date of Service: 7/1/2021  8:35 PM Status: Signed    : Gi Pennington PharmD (Pharmacist)       Pharmacy Note - Admission Medication History    Pertinent Provider Information:   -During interview with patient he states that he thinks the cyproheptadine contributed to his fall and that he is hesitant to use this medication again because of this.    -Patient has been filling prochlorperazine 5 mg every 6 hours as needed for anxiety (last filled 6/14/21) - however, he did not think he was taking this medication any longer and it was not on his hand written med list from home so did not add medication to PTA med list at this time.     -Pharmacist at [facility] reported current clompramine dose as 25 mg in the morning and 50 mg at bedtime. Patient reports that he is now taking 50 mg two times a day. Updated med list to reflect patient report   ______________________________________________________________________    Prior To Admission (PTA) med list completed and updated in EMR.       PTA Med List   Medication Sig Last Dose   ? clomiPRAMINE (ANAFRANIL) 50 MG capsule Take 50 mg by mouth 2 (two) times a day.    ? cyproheptadine (PERIACTIN) 4 mg tablet Take 4 mg by mouth every 4 (four) hours as needed ((max 4 tablets per day)).    ? melatonin 10 mg Tab Take 1 tablet by mouth at bedtime as needed. (Nature's Bounty Sleep 3 Supplement)    ? prazosin (MINIPRESS) 1 MG capsule Take 1 mg by mouth 2 (two) times a day.        Information source(s): Patient, Patient's pharmacy ([facility] [phone]) and [facility]  Method of interview communication: in-person    Summary of Changes to PTA Med List  New: clomipramine, cyproheptadine, melatonin, prazosin  Discontinued: n/a  Changed: n/a    Patient was asked about OTC/herbal products specifically.  PTA  med list reflects this.    Patient does not use any multi-dose medications prior to admission.    The information provided in this note is only as accurate as the sources available at the time of the update(s).    Thank you for the opportunity to participate in the care of this patient.    Gi Pennington, PharmJEREL  7/1/2021 8:35 PM

## 2021-07-04 NOTE — PLAN OF CARE
Plan of Care by Pacheco Arroyo RN at 7/4/2021  6:02 AM     Author: Pacheco Arroyo RN Service: -- Author Type: Registered Nurse    Filed: 7/4/2021  6:45 AM Date of Service: 7/4/2021  6:02 AM Status: Signed    : Pacheco Arroyo RN (Registered Nurse)       Patient slept more than 6 hours on this shift. Safety checks were conducted every 15 minutes to ensure patient's safety. No behaviors were noted and patient denied pain, anxiety, depression and hallucination.No PRN was given and no medication was due at this time.

## 2021-07-04 NOTE — PLAN OF CARE
Plan of Care by Carlos Sellers, RN at 7/4/2021 12:55 PM     Author: Carlos Sellers RN Service: -- Author Type: Registered Nurse    Filed: 7/4/2021 12:55 PM Date of Service: 7/4/2021 12:55 PM Status: Signed    : Carlos Sellers RN (Registered Nurse)         Problem: Pain  Goal: Patient's pain/discomfort is manageable  Outcome: Progressing     Problem: Safety  Goal: Patient will be injury free during hospitalization  Outcome: Progressing     Problem: Daily Care  Goal: Daily care needs are met  Outcome: Progressing     Problem: Potential for Falls  Goal: Patient will remain free of falls  Outcome: Progressing     Patient denies suicidal and homicidal ideation and contracts for safety. He rated anxiety at 7 and depression 5. He was isolative to room, calm and cooperative in his interactions with staff but would be noted to be crying every time he was talking to his mom over the phone. He complained of a headache (4/10) and PRN Tylenol was given.

## 2021-07-04 NOTE — PROGRESS NOTES
Progress Notes by Driss Shelby RN at 7/1/2021  9:35 PM     Author: Driss Shelby RN Service: -- Author Type: Registered Nurse    Filed: 7/1/2021  9:42 PM Date of Service: 7/1/2021  9:35 PM Status: Signed    : Driss Shelby RN (Registered Nurse)       42 yo male patient admitted here 2800 MH at 1945. Patient presented to [facility] ED for evaluation of head injury after a fall and increased anxiety. Here he present as pleasant, cooperative and engaged. Alert and oriented x4, able to communicate needs and express his feelings. Visible tremors in bilateral hands (chronic), stated anxiety better 3/10 and depression 3/10, denied any SI/HI/SI, contracted for safety. Abrasion left forehead, he denied any pain or discomfort.

## 2021-07-04 NOTE — PLAN OF CARE
Plan of Care by Marimar Garza RN at 7/2/2021 12:44 PM     Author: Marimar Garza RN Service: -- Author Type: Registered Nurse    Filed: 7/2/2021  2:52 PM Date of Service: 7/2/2021 12:44 PM Status: Signed    : Marimar Garza RN (Registered Nurse)         Problem: Pain  Goal: Patient's pain/discomfort is manageable  Outcome: Progressing     Problem: Safety  Goal: Patient will be injury free during hospitalization  Outcome: Progressing     Problem: Daily Care  Goal: Daily care needs are met  Outcome: Progressing     Problem: Psychosocial Needs  Goal: Demonstrates ability to cope with hospitalization/illness  Outcome: Progressing     Problem: Potential for Falls  Goal: Patient will remain free of falls  Outcome: Progressing  Pt pleasant, calm and cooperative. Engages with peers. Denies SI. HI, SIB, visual and auditory hallucinations.  Contracts for safety. Endorsed depression at 3/10, anxiety 5/10, generalized pain from the previous fall 5/10. Pt meds complaint. Had a one time order for 1mg Ativan IM, administered on L deltoid. Pt has an abrasions on the forehead. Good foods and fluids. Pt remain free of falls on the unit. Will continue to monitor.

## 2021-07-04 NOTE — PROGRESS NOTES
Progress Notes by Suzanna Kaur OT at 7/2/2021 11:06 AM     Author: Suzanna Kaur OT Service: -- Author Type: Occupational Therapist    Filed: 7/2/2021 11:06 AM Date of Service: 7/2/2021 11:06 AM Status: Signed    : Suzanna Kaur OT (Occupational Therapist)          07/02/21 1106   Engagement   Intervention Group   Topic Detail OT Creative Expressions group-watercolor pencil card making for social engagement, healthy leisure, healthy distraction, focus, creativity, and symptom management   Attendance Did not attend   Reason for Not Attending Not available

## 2021-07-04 NOTE — PROGRESS NOTES
Progress Notes by Manuela Mancini SW at 7/3/2021 11:55 AM     Author: Manuela Mancini SW Service: -- Author Type:     Filed: 7/3/2021 11:55 AM Date of Service: 7/3/2021 11:55 AM Status: Signed    : Manuela Mancini SW ()          07/03/21 1154   Engagement   Intervention Group   Topic Symptom management   Topic Detail Process Group: Exploring strengths   Attendance Did not attend   Reason for Not Attending Refused

## 2021-07-05 PROBLEM — F84.0 AUTISM SPECTRUM DISORDER: Chronic | Status: ACTIVE | Noted: 2021-07-02

## 2021-07-05 PROBLEM — F39 UNSPECIFIED MOOD (AFFECTIVE) DISORDER (H): Status: RESOLVED | Noted: 2021-07-01 | Resolved: 2021-07-02

## 2021-07-05 PROBLEM — F43.10 PTSD (POST-TRAUMATIC STRESS DISORDER): Chronic | Status: ACTIVE | Noted: 2021-07-02

## 2021-07-05 PROBLEM — F42.9 OCD (OBSESSIVE COMPULSIVE DISORDER): Status: ACTIVE | Noted: 2021-07-02

## 2021-07-05 PROBLEM — F40.01 PANIC DISORDER WITH AGORAPHOBIA: Chronic | Status: ACTIVE | Noted: 2021-07-02

## 2021-07-05 PROCEDURE — 999N001212 HC REV CODE 9999 CHARGE CONVERSION OPNP

## 2021-07-05 PROCEDURE — 97112 NEUROMUSCULAR REEDUCATION: CPT | Mod: GP

## 2021-07-05 PROCEDURE — 97161 PT EVAL LOW COMPLEX 20 MIN: CPT | Mod: GP

## 2021-07-05 NOTE — PLAN OF CARE
Plan of Care by Chino Santana PT at 7/5/2021  1:34 PM     Author: Chino Santana PT Service: -- Author Type: Physical Therapist    Filed: 7/5/2021  1:34 PM Date of Service: 7/5/2021  1:34 PM Status: Signed    : Chino Santana PT (Physical Therapist)         Problem: Physical Therapy  Goal: PT Goals  Description: Patient will participate in evaluation by 7/5/2021 in order to assess functional level and determine safe discharge plan. -met    Pt. To demonstrate that he is a low falls risk via SLS, heel-toe amb, and retro amb.   MET    Goal entered on 7/5/2021 by Chino Santana PT    Outcome: Completed

## 2021-07-05 NOTE — PLAN OF CARE
Plan of Care by Jamir Chun RN at 7/5/2021  2:45 PM     Author: Jamir Chun RN Service: -- Author Type: Registered Nurse    Filed: 7/5/2021  2:45 PM Date of Service: 7/5/2021  2:45 PM Status: Signed    : Jamir Chun RN (Registered Nurse)         Problem: Pain  Goal: Patient's pain/discomfort is manageable  Outcome: Progressing     Problem: Safety  Goal: Patient will be injury free during hospitalization  Outcome: Progressing     Problem: Daily Care  Goal: Daily care needs are met  Outcome: Progressing     Problem: Potential for Falls  Goal: Patient will remain free of falls  Outcome: Progressing     Patient appears labile, cooperative with cares ate 0% breakfast and 50% lunch with good fluid intake. Patient was medication compliant' Patient complained that his sleep was ruined by his next door neighbor that he had been up since 0300 and not being able to fall back to sleep. Patient made a phone call to his mom, continues to be labile on the phone alternating crying with bright affect. C/o to his mother that  he  was unable to sleep and scared because he could not remember anything that things 's getting worse. Patient  also heard disputing his diagnosis of OCD  On the phone  With his mother, sometimes telling her to Shut up, continues to cry and within seconds become bright.  At the end of the conversation patient abruptly hung up the phone, very upset and enter his room and started wailing loudly. When approached by this writer, patient says he was on the phone with his mom who was blaming him for screwing things up and that she says she doesn't want to talk to him any duggan. Patient was frustrated, telling this writer that he was not getting better that his memory  getting too long to improve, patient was reassured, visited by Dr Carpenter who talked to the patient extensively.    Jamir Chun DNP, RN, APRN, CNS, AGCNS-BC.

## 2021-07-05 NOTE — PROGRESS NOTES
Progress Notes by Chino Santana, PT at 7/5/2021  1:33 PM     Author: Chino Santana PT Service: -- Author Type: Physical Therapist    Filed: 7/5/2021  1:33 PM Date of Service: 7/5/2021  1:33 PM Status: Signed    : Chino Santana PT (Physical Therapist)       Physical Therapy     07/05/21 1327   Visit Specifics   Eval Type Initial eval   Bed/Tabs/Pad Alarm Applied Not applicable   Subjective Patient Comments Pt. in bed agreeable   General   Onset date 07/02/21   Additional Pertinent History Pt. had fall at home after taking new PRN.  Pt. report drug made him very groggy   Chart Reviewed Yes   PT/OT Patient/Caregiver Stated Goals for his toe to stop hurting when he flexes it and puts over pressure on it (he does report it is has improved significantly in the past few days)   Family/Caregiver Present No   Treatment Time   Neuromuscular Re-education 8   Home Living   Type of Home Apartment   ADL Devices Other (Comment)  (none)   Mobility Equipment Other (Comment)  (none)   Prior Status   Prior Device Use None of the above   Indoor Mobility Independent   Lives With Alone   Device Used No   RLE Assessment   RLE Assessment WFL   LLE Assessment   LLE Assessment WFL   Sensory   Sensory Impairment Light touch   Light Touch No apparent deficits   Bed Mobility   Supine to Sit Independent   Sit to Supine Independent   Transfer    Sit To Stand Independent   Stand To Sit Independent   Ambulation    Distance (ft)  50   Assistance Independent   Assistive Device None   Quality of Gait/Comment WFL   Endurance Deficit   Endurance Deficit No   Neuro Re-Ed   Neuro Re-Ed Intervention   Intervention SLS R and L LE 15 seconds, retro amb independent, heel toe ambulation independent.   Balance   Sitting Balance Independent   Standing Balance Independent   Fall Risk   Fall Risk Low   Plan   Treatment/Interventions Eval only   Assessment   Prognosis Excellent   Recommendation   PT Discharge Recommendation Home alone   PT Summary Pt. had  recent fall due to side effects of new PRN medication.  Pt. reports he is no longer using this medication and is currently not at a risk for falls.  Pt. has no further physical therapy needs at this time.

## 2021-07-05 NOTE — CONSULTS
Consults by Andra Ibarra LP at 2021 10:00 AM     Author: Andra Ibarra LP Service: -- Author Type: Psychologist    Filed: 2021 11:05 AM Date of Service: 2021 10:00 AM Status: Signed    : Andra Ibarra LP (Psychologist)     Consult Orders    1. Inpatient consult to Psychology Reason for Consult? Behavioral plan and therapy; Did you contact the consulting MD? No; Consult priority: Today (routine); Communication for MD: Please have the consultant call me BEFORE seeing the patient; Phone nu... [[phone]] ordered by Amber Tyson MD at 21 1011             Psychology Psychotherapy  Note       Name:  Glenroy Adler  :  1980  MRN:  [id number]      Date: 2021  Duration:  7 minutes (10:00-10:07am)     Target Symptoms:    The patient was seen in light of concerns regarding symptoms of severe depression, anxiety, and OCD.  Provider requested to have a daily schedule to be created for patient     Participation:  The patient was able to participate and benefit from treatment as evidenced by his verbal expression of ideas and initiation of topics discussed.     Mental Status:    Mood:  Depressed, anxious, flat and irritable  Affect:  irritable, dysthymic  Motor activity: Within normal limits  Suicidal Ideation:  None reported  Homicidal Ideation:  None reported  Thought process:   Within normal limits  Thought content: Within normal limits  Attention/Concentration:  Within normal limits; Within normal limits  Language/Speech ability:  Within normal limits  Memory:  No evidence of impairment.  Insight and Judgement:  fair  Orientation:  person, place, time and situation  Appearance: disheveled,, flat, and dismissive,  Attire:  Appropriate  Grooming: Disheveled  Age: appears stated age  Eye Contact:  Variable, mostly avoidant  Estimated IQ:  average          Intervention:    Writer approached patient in his room where he was laying down.  Patient was  "agreeable to meet with writer in the exam room.  Patient complained about being tired as he not slept well since being here but particularly last night as the person next to him was loud and screaming last night.  Writer validated patient and suggested asking for ear plugs as an option if needed.  Patient explained his diagnoses and how he has 'reassurance OCD\".  Writer attempted to clarify if he counts, checks things, etc.  Patient denied any of these things and reiterated it was 'reassurance OCD\" and appeared flustered and frustrated that writer didn't fully understand. Patient complained that he cannot sit out on the couch at his place because he sweats a lot because of the meds so he can change his bedding but can't clean the upholstery.   Patient was somewhat dismissive of writer in general.  Noted he only gets out of bed for eating and meds, similar to home.  There was a time he was not like this but \"it's been a while.\"  Patient has no motivation to do anything.  Validated patient and normalized that is a part of depression.  Discussed the idea of creating a schedule for him to try to do something during the day.  Patient stated he does already, \"take my meds\".  Writer validated this and suggested adding to it.  Patient was flustered and complained more of being tired so unable to think clearly and didn't anticipate writer coming today.  Offered to come tomorrow morning and patient agreed.     Psychoterapeutic Techniques: Interpersonal Psychotherapy, Cognitive-behavioral therapy, motivational interviewing and supportive psychotherapy strategies were utilized.     Necessity:    The session was necessary for the care of the patient to address symptoms of of severe depression, anxiety, and OCD.  Provider requested to have a daily schedule to be created for patient     Progress:    Working on developing rapport and trust.     Plan:    This writer will continue to meet with the patient on a regular basis while in " the [facility] to address mental health symptoms by continuing to utilize cognitive-behavioral and supportive psychotherapy.     Diagnosis:    Depression  OCD  Autism Spectrum Disorder  PTSD  Panic Disorder with Agoraphobia       Provider:  Andra Ibarra PsyD, LP  Date: 7/5/2021

## 2021-07-05 NOTE — TREATMENT PLAN
Treatment Plan by Faye Liu SW at 7/5/2021  9:18 AM     Author: Faye Liu SW Service: -- Author Type:     Filed: 7/5/2021  9:25 AM Date of Service: 7/5/2021  9:18 AM Status: Signed    : Faye Liu SW ()       Comprehensive Multidisciplinary Treatment Plan    Date: 7/5/2021       Treatment Plan:  Initial Treatment Plan    Patient Name:  Glenroy Adler       Medical Record Number: [id number]   YOB: 1980  Admit Date:  7/1/2021    Substantiated Diagnosis:   Patient Active Problem List   Diagnosis   ? OCD (obsessive compulsive disorder)   ? Autism spectrum disorder   ? Panic disorder with agoraphobia   ? PTSD (post-traumatic stress disorder)        Patient Strengths: TBD based upon Intake     LTG: By discharge patient will demonstrate stabilized mental health symptoms in order to discharge to appropriate level of care.      STG #1 addressing barrier to discharge: Anxiety     GOAL:  Verbalize thoughts and feelings associated with:    INTERVENTIONS: 1. Assess and re-assess patient's level of risk (As needed) 2. Encourage patient to express feelings, fears, frustrations, hopes (As needed) 3. Identify and monitor triggers for panic/anxiety symptoms  GOAL:  Reduce average anxiety levels:    INTERVENTIONS: 1. Learn and practice relaxation techniques and other coping strategies 2. Identify and monitor triggers for panic/anxiety symptoms GOAL:  Attend and participate in unit activities, including therapeutic, recreational, and educational groups :    INTERVENTIONS: Provide therapeutic and educational activities daily, encourage attendance and participation, and document same in the medical record (As needed)   Responsible discipline to address this goal: Registered Nurse, , Provider, Occupational Therapy and Behavior Tech   Target Date:  07/07/2021    STG#2 addressing barrier to discharge: Depression      GOAL:  Verbalize thoughts and  feelings associated with :  INTERVENTIONS: 1. Assess and re-assess patient's level of risk  (As needed)   2. Engage patient in 1:1 interactions for a minimum of 15 minutes each session  (As needed)   3. Encourage patient to express feelings, fears, frustrations, hopes  (As needed)  GOAL:  Refrain from harming self     INTERVENTIONS:   1. Monitor patient closely, per order  (As needed)   2. Supervise medication ingestion, monitor effects and side effects  (As needed)  GOAL: Refrain from isolation     INTERVENTIONS:   1. Develop a trusting relationship  (As needed)   2. Encourage socialization  (As needed)  GOAL: Attend and participate in unit activities, including therapeutic, recreational, and educational groups    INTERVENTIONS: Provide therapeutic and educational activities daily, encourage attendance and participation, and document same in the medical record  (As needed)  GOAL: Refrain from self-neglect by completing daily ADLs, including personal hygiene independently, as able    INTERVENTIONS:   1. Observe, teach, and assist patient with ADLs  (As needed)   2. Monitor and promote a balance of rest/activity, with adequate nutrition and elimination  (As needed)   Responsible discipline to address this goal: Registered Nurse, , Provider, Occupational Therapy and Behavior Tech  Target Date:  07/05/2021    Patient Involvement:   [x] Yes   [] No     [] NA  Patient involved in treatment plan development   [x] Yes   [] No     [] NA  Family and/or significant other involved in treatment plan development   [x] Yes   [] No     [] NA  Patient concurs with treatment plan    Active Participation and Responsibility in Treatment Regimen: Pt is not engaged in unit program offered.  Pt is cooperative with unit expectations.  Pt is medication compliant.        Use of seclusion and/or restraints is a frequent occurrence: No    Is the patient reporting concerns regarding pain or medical issues? None reported    If yes,  how are those concerns being addressed: N/A    Anticipated Discharge Date: TBD  Discharge Plan: Home with outpatient supports    Providers present at review:  Title:  Present:  Initials  RN      [x] Yes   [] No   PC    SW   [x] Yes   [] No   MW    Provider  [x] Yes   [] No   BS    OT   [x] Yes   [] No   VD     Pharmacist  [] Yes   [x] No       Psychologist   [] Yes   [x] No      Faye Liu, SW

## 2021-07-05 NOTE — PLAN OF CARE
Plan of Care by Pacheco Arroyo RN at 7/5/2021  5:56 AM     Author: Pacheco Arroyo RN Service: -- Author Type: Registered Nurse    Filed: 7/5/2021  6:52 AM Date of Service: 7/5/2021  5:56 AM Status: Signed    : Pacheco Arroyo RN (Registered Nurse)       Patient was calm and slept for about 6 hours or more, safety checks were conducted every 15 minutes. No pain was reported and patient denied all psych symptoms.No PRN was given.

## 2021-07-05 NOTE — PLAN OF CARE
"Plan of Care by Suzanna Kaur OT at 7/5/2021  2:58 PM     Author: Suzanna Kaur OT Service: -- Author Type: Occupational Therapist    Filed: 7/5/2021  3:54 PM Date of Service: 7/5/2021  2:58 PM Status: Signed    : Suzanna Kaur OT (Occupational Therapist)       [facility]  Occupational Therapy Assessment Note    Patient Name:  Elrodney VELASCO Nayely    D:  OT attempted x3 to meet with patient and follow up with OT questionnaire. On initial visit patient shared he was tired due to lack of sleep. Pt agreeable for therapist to follow up later. On second approach patient was labile-pt began to cry while seated on the edge of his bed stating \"I just want to hug my mom\". Pt shared he started to fill out questionnaire but has been too upset and tired to complete at this time. Patient was in bathroom on last approach.  Initial OT assessment completed based on chart review and approaches. Per chart review:  Pt is a 41 y.o. male with history of OCD (obsessive compulsive disorder), patient is single, with no children domiciled by him self in a town home but he has a care team from a private company, patient is on disability; that was admitted to the psychiatric inpatient unit due to severe depression and inability to take care of himself due to multiple recent medication changes. Pt had a fall at home.    Refer to flow sheet for OT questionnaire details:     07/05/21 1400   Self-Management   Identifies healthy leisure interests \"I don't know\"   Peer group mom     A: Patient demonstrates unwillingness to participate in assessment process at this time.  The severity of patients symptoms appear to affect motivation for social interaction and cooperation. Patient would benefit from opportunities for self-care skills, social interaction skills, self-management skills, ADL/work/leisure/processing skills training, and exploration of healthy coping skills and leisure interests for improved symptom and stress " management.   Pt appears to have the following strengths: redirectable,  accepts assistance  Pt appears to have the following limitations/barriers:severity of sxs, maladaptive coping skills, environmental barriers, dismissive/resistant, disengaged, ineffective communication/social skills, disorganized, isolative, lack of purposeful routine     P: Initiate care plan goals and interventions.    Patient participated in goal(s) selection and understands the plan of care: No    Plan for Next Treatment: Continue to establish rapport and encourage group participation with focus on goal area/s.    Suzanna Kaur, OT  Date: 7/5/2021  Time: 2:58 PM

## 2021-07-05 NOTE — PROGRESS NOTES
Progress Notes by Suzanna Kaur OT at 7/5/2021 11:13 AM     Author: Suzanna Kaur OT Service: -- Author Type: Occupational Therapist    Filed: 7/5/2021 11:13 AM Date of Service: 7/5/2021 11:13 AM Status: Signed    : Suzanna Kaur OT (Occupational Therapist)          07/05/21 1112   Engagement   Intervention Group   Topic Detail OT Creative Expressions group-watercolor painting for creativity, social engagement, symptom management, healthy leisure, healthy distraction and focus   Attendance Attended   Reason for Not Attending Refused  (declined personal invite. pt shared he was tired)

## 2021-07-05 NOTE — PLAN OF CARE
"Plan of Care by Sydney Joe, RN at 7/4/2021  5:51 PM     Author: Sydney Joe RN Service: -- Author Type: Registered Nurse    Filed: 7/4/2021 10:06 PM Date of Service: 7/4/2021  5:51 PM Status: Signed    : Sydney Joe RN (Registered Nurse)         Problem: Pain  Goal: Patient's pain/discomfort is manageable  Outcome: Progressing     Problem: Safety  Goal: Patient will be injury free during hospitalization  Outcome: Progressing     Problem: Daily Care  Goal: Daily care needs are met  Outcome: Progressing    Patient presented as anxious and tearful in interactions.  Reports mood as \"not good\".  Rates depression at 3/10.  Rates anxiety at 5/10 and stated \"dealing with meds\", \"memory issues\" was causing his anxiety.  Contracted for safety.  Denies SI and HI.  Medication compliant.  Present in the milieu.  Denies hallucinations.  Pt refused to shower X3.  Ate 85% of dinner.             "

## 2021-07-05 NOTE — PROGRESS NOTES
Progress Notes by Jeanne Alonso SW at 7/5/2021  1:17 PM     Author: Jeanne Alonso SW Service: -- Author Type:     Filed: 7/5/2021  1:17 PM Date of Service: 7/5/2021  1:17 PM Status: Signed    : Jeanne Alonso SW ()          07/05/21 1317   Engagement   Intervention Group   Topic Self-worth/hopefulness   Topic Detail Process group: Practicing grattitude   Attendance Did not attend

## 2021-07-06 PROCEDURE — 999N001212 HC REV CODE 9999 CHARGE CONVERSION OPNP

## 2021-07-06 NOTE — PLAN OF CARE
"Plan of Care by Carlos Sellers, RN at 7/5/2021 10:36 PM     Author: Carlos Sellers, RN Service: -- Author Type: Registered Nurse    Filed: 7/5/2021 10:37 PM Date of Service: 7/5/2021 10:36 PM Status: Addendum    : Carlos Sellers RN (Registered Nurse)    Related Notes: Original Note by Carlos Sellers RN (Registered Nurse) filed at 7/5/2021 10:04 PM         Problem: Pain  Goal: Patient's pain/discomfort is manageable  Outcome: Progressing     Problem: Safety  Goal: Patient will be injury free during hospitalization  Outcome: Progressing     Problem: Daily Care  Goal: Daily care needs are met  Outcome: Progressing     Problem: Psychosocial Needs  Goal: Demonstrates ability to cope with hospitalization/illness  Outcome: Progressing     Patient denies suicidal and homicidal ideation and contracts for safety. He rated both anxiety and depression at 8/10 and denied all other psych symptoms. He said he was glad to be in a new room but that he was \"still trying to get used to it.\"        "

## 2021-07-06 NOTE — PROGRESS NOTES
Progress Notes by David Beard MD at 7/5/2021  8:55 AM     Author: David Beard MD Service: Psychiatry Author Type: Physician    Filed: 7/5/2021  9:55 PM Date of Service: 7/5/2021  8:55 AM Status: Addendum    : David Beard MD (Physician)    Related Notes: Original Note by David Beard MD (Physician) filed at 7/5/2021  9:36 PM       PSYCHIATRY  PROGRESS NOTE     DATE OF SERVICE   7/5/2021         INFORMATION REGARDING HOSPITALIZATION    Per 7/2/2021 H&P from Dr. Roche  This is a 41 y.o. male with history of OCD (obsessive compulsive disorder), patient is single, with no children domiciled by him self in a town home but he has a care team from a private company, patient is on disability; that was admitted to the psychiatric inpatient unit due to severe depression and inability to take care of himself due to multiple recent medication changes.     Today patient was seen and evaluated in the consult room with nurse practitioner present during the assessment, this was a face-to-face evaluation.  Patient presented during the assessment extremely anxious, perseverating on his medications and I did again not able to provide accurate information.  His thought process is very concrete and he clearly has multiple obsessions.  Patient was perseverating on him not receiving his clomipramine and prazosin last night.  I informed the patient that prior to starting any medications we need to verify with the pharmacy and I always like to meet with the patient is to make sure that the medications the pharmacy reviewed are correct.  At the end patient verbalized good understanding and was in agreement with proceeding with the assessment.     Patient said that he had a huge fall because one of the medications that he is outpatient provider prescribed.  He was supposed to take this medication as needed for anxiety (Periactin).  Last Monday at 6 in the morning was when he had his fall and  "patient thinks that the new medication \"screwed up with the melatonin\". He was drowsy, foggy, got up and he fell down the stairs.  Patient tells me that he agreed to come to the hospital because he needs some to do a thorough look at he is medications.  Patient was extremely anxious at the moment of the assessment and he was unable to fully talk about his symptoms and how he has been feeling at home.  Patient did reported that he is nervous, anxious, overlwhelmed, scared because he wants things to get better.  Patient verbalized having problems with anxiety and depression. Ever since the accident last Monday this has been worst. Has memory problems, sensory problems, concentration is poor and he even said that he has problems understanding the suzanne here in the unit.  Patient was very upset about not taking the clomipramine this morning but at the same time patient tells me that he doesn't think the Clomipramine has been helpful, and the outpatient psychiatrist has been thinking about taking it away.  Patient said that over the last few days the only medication that was helpful was the lorazepam that he received in the emergency room.  He has developed bad side effects to the hydroxyzine and does not want to take this medication anymore.  At this time patient denies having any thoughts about harming himself or harming others and he has been able to contract for safety.  Denies having any hallucinations and no delusions have been elicited.      When talking about medications patient tells me that he has to stay away form all selective serotonin reuptake inhibitor because they cause more harm.  He also has to stay away form antipsychotics, they do nothing and \"screw him up\" (EPS?).  Patient was not able to give me a list of prior medications use.he  believed that he took Abilify and this medication was not beneficial..      I was able to communicate with patient's mother Cleo over the phone with patient present " during the interview.  Mother was also very upset about the patient not being started on clomipramine last night.  I have informed the mother the reasons why this was not done.  First I needed for the pharmacy to review the medication and I needed to review the medication myself with the patient.  Mother was also upset about the staff in the evening and she was constantly asking if we do have nurses working in the unit.  I did confirmed with the mother that we do have a nurse working with the patient on every shift.       Mother reported that the prazosin and the antyhistamine cause more problems and made the patient very anxious. Lorazepam has been the best medication for him, it calms him down, and he is able to function.  Lorazepam is not causing any side effects. The hesitaiton with his outpatient provider to give him this medication is because is related to valium and they do not want to put him on anything that is addictive.  Patient was very used to take Valium as was dependant on it. In 2019 the outpatient providers started to taper down the Valium. Valium was completely discontinued on May 23, 2021.  Mother thinks that after the discontinuation of the Valium the anxiety and the panic started coming forward.  In addition to all of this patient has been experiencing problems with staffing.  There have been a lot of changes for the patient at home.  He does not have the same staff working with him consistently, this has caused a lot of anxiety and has increased patient's obsessive thinking.  Mother reported that he currently the patient doesn't want to get out of bed, is having panick attacks and doesn't want to face the problems with the staff. He can't prepare food for him self, has not shower in a month and half and he is unable to leave his house.      At that moment I discussed with the patient and mother the tentative treatment plan.  First I have placed a consult for the hospitalist to come and see the  patient as his blood pressure has been elevated.  Second I have sent a message to the psychologist in order to work with the patient in creating a behavioral plan here in the unit..  I will communicate with the outpatient provider and coordinate cares with her.  In the meantime given that both patient and mother reported that the patient has done well on lorazepam and I will put him on lorazepam 0.5 mg 3 times a day.  Both mother and patient have verbalized good understanding and they are in agreement with this plan.     After I spoke with her outpatient providers I went back to the unit and met with the patient.  I reviewed with the patient the information that he was provided by the outpatient provider.  I discussed with the patient the use of Pristiq including reasons for prescribing this, benefits, risk and side effects.  After clarifying patient's questions he informed me is in agreement with a trial of Pristiq.     As per outpatient provider:   Patient has 2 different records in care everywhere and he has 2 different addresses.     Outpatient provider Rosanna Flores informed me that patient has been experiencing problems with the ability to cope with live, change and his anxiety. Valium was discontinue and the patient tolerated this well. More recently the problems is staffing. Staff has been quiting and he has been without staff.  Patient had an [facility] assessment that said that he needs 24/7 assistance.  This has led to more panic, patient making coments to wanting to kill him self because he wants to have people around him.  This cycle that is unbreakable at this moment.  The anxiety is not undercontrol.  Mark informed me that she wanted to take him off Clomipramine as this has not work to treat his anxiety, but the patient is not willing to change the medication at this time or increase the dose of clonazepam. Patient is convinced any other medication will cause problems.  In the past when the patient  "gets a new medication he asked for the staff that works with him to google the medication for side effects for the parents will do that for him.  He had [facility] assessment done years ago.  Outpatient provider thinks that the patient can benefit from a trial of Pristiq.  She will be faxing the [facility] assessment and her last note.     Later on I did receive both and indeed in the [facility] assessment it said that Pristiq is a medication that can be tried on the patient.  On her progress note said that the patient has tried Prozac, Lexapro, Celexa, Remeron, trazodone, temazepam, clomipramine, Abilify, Seroquel, Zyprexa, Depakote, diazepam, lorazepam, hydroxyzine, clonidine, guanfacine, propanolol, propanolol LA, melatonin, Benadryl and l-methylfolate.       CHIEF COMPLAINT   \"my memory is gone\"       SUBJECTIVE/OBJECTIVE   Upon patient interview, patient reports that he is having trouble with his memory and is not doing well.  He is very obsessive, anxious and depressed.  There have been a lot of transitions and he is not handling them well.  He is also Autistic which has exacerbated everything.  We discussed his medications and the cross titration that was started last week as the clomipramine has not been effective at 100 mg.  If he goes higher, he has side effects.  I explained the plan to taper it down and get Pristiq into the medication regimen.  He reports he did not sleep well as it was loud on the unit.  He has been calling his mother a lot as well.  We discussed limiting his calls.  He denies any SI/HI/AH/VH at this time and denies any appetite or physical issues.    Multidisciplinary team meeting was held today.  See comprehensive multidisciplinary treatment plan for full details.     Nursing:   Patient was calm and slept for about 6 hours or more, safety checks were conducted every 15 minutes. No pain was reported and patient denied all psych symptoms.No PRN was given.    :  Working on " getting home supports or a different living situation    Psychiatry:  I spoke with the patient and how OCD, anxiety and depression can cause what seems to be memory issues, when in fact it is decreased focus and concentration from the anxiety, OCD that lead to lack of memory.  I discussed the clomipramine- Pristiq cross taper with him.  I will discontinue the AM Prazosin as it is not really helpful and can add to AE.  I traditionally give it at  for PTSD related nightmares.       CONSULTATIONS   Psychology Psychotherapy  Note        Name:  Glernoy Adler  :  1980  MRN:  [id number]        Date: 2021  Duration:  7 minutes (10:00-10:07am)     Target Symptoms:    The patient was seen in light of concerns regarding symptoms of severe depression, anxiety, and OCD.  Provider requested to have a daily schedule to be created for patient     Participation:  The patient was able to participate and benefit from treatment as evidenced by his verbal expression of ideas and initiation of topics discussed.     Mental Status:    Mood:  Depressed, anxious, flat and irritable  Affect:  irritable, dysthymic  Motor activity: Within normal limits  Suicidal Ideation:  None reported  Homicidal Ideation:  None reported  Thought process:   Within normal limits  Thought content: Within normal limits  Attention/Concentration:  Within normal limits; Within normal limits  Language/Speech ability:  Within normal limits  Memory:  No evidence of impairment.  Insight and Judgement:  fair  Orientation:  person, place, time and situation  Appearance: disheveled,, flat, and dismissive,  Attire:  Appropriate  Grooming: Disheveled  Age: appears stated age  Eye Contact:  Variable, mostly avoidant  Estimated IQ:  average           Intervention:    Writer approached patient in his room where he was laying down.  Patient was agreeable to meet with writer in the exam room.  Patient complained about being tired as he not slept well since  "being here but particularly last night as the person next to him was loud and screaming last night.  Writer validated patient and suggested asking for ear plugs as an option if needed.  Patient explained his diagnoses and how he has 'reassurance OCD\".  Writer attempted to clarify if he counts, checks things, etc.  Patient denied any of these things and reiterated it was 'reassurance OCD\" and appeared flustered and frustrated that writer didn't fully understand. Patient complained that he cannot sit out on the couch at his place because he sweats a lot because of the meds so he can change his bedding but can't clean the upholstery.   Patient was somewhat dismissive of writer in general.  Noted he only gets out of bed for eating and meds, similar to home.  There was a time he was not like this but \"it's been a while.\"  Patient has no motivation to do anything.  Validated patient and normalized that is a part of depression.  Discussed the idea of creating a schedule for him to try to do something during the day.  Patient stated he does already, \"take my meds\".  Writer validated this and suggested adding to it.  Patient was flustered and complained more of being tired so unable to think clearly and didn't anticipate writer coming today.  Offered to come tomorrow morning and patient agreed.     Psychoterapeutic Techniques: Interpersonal Psychotherapy, Cognitive-behavioral therapy, motivational interviewing and supportive psychotherapy strategies were utilized.     Necessity:    The session was necessary for the care of the patient to address symptoms of of severe depression, anxiety, and OCD.  Provider requested to have a daily schedule to be created for patient     Progress:    Working on developing rapport and trust.     Plan:    This writer will continue to meet with the patient on a regular basis while in the hospital to address mental health symptoms by continuing to utilize cognitive-behavioral and supportive " "psychotherapy.     Diagnosis:    Depression  OCD  Autism Spectrum Disorder  PTSD  Panic Disorder with Agoraphobia        Provider:  Andra Ibarra PsyD, LP  Date: 7/5/2021     Allergies     Patient has no known allergies.    MEDICATIONS     Scheduled Meds:  ? clomiPRAMINE  25 mg Oral DAILY   ? clomiPRAMINE  50 mg Oral QHS   ? desvenlafaxine succinate  25 mg Oral DAILY   ? LORazepam  0.5 mg Oral TID   ? prazosin  1 mg Oral BID       PRN Meds:  acetaminophen, aluminum-magnesium hydroxide-simethicone, hydrALAZINE, melatonin, polyethylene glycol    Medication adherence: Reviewed risk/benefits of medication , Patient able to verbalize understanding of side effects  and Patient verbally consents to taking medications  Medication side effects: none  Benefit from medications: Not yet       ROS   Pertinent items are noted in HPI.         MENTAL STATUS EXAM   Vitals: BP (!) 146/93 (Patient Position: Standing)   Pulse (!) 112   Temp 97.6  F (36.4  C) (Oral)   Resp 17   Ht 5' 9\" (1.753 m)   Wt 202 lb 9 oz (91.9 kg)   SpO2 98%   BMI 29.91 kg/m        Level of Consciousness:  alert and oriented  Appearance:  Disheveled  Attitude:  cooperative and polite  Speech:  Within normal   Regular rate, rhythm, volume and tone  Language ability: intact  Mood:  \" Very anxious\"  Affect: anxious  Suicidal Ideation: absent  Homicidal Ideation: absent  Thought formation: concrete  Thought content: Obsession  Fundamentals of Knowledge: Sufficient  Attention/Concentration: poor  Memory: intact.  Patient thinks it is decreased but it is likely an attention and focus issue  Insight and Judgement: impaired judgment and impaired insight  Orientation: person, place, time and situation  Psychomotor Behavior: restless  Muscle Strength and Tone: normal  Gait and Station: intact       LABS   personally reviewed.   No results found for this or any previous visit (from the past 48 hour(s)).  No results found for: PHENYTOIN, PHENOBARB, VALPROATE, " CBMZ       IMAGING     No new Imaging       DIAGNOSIS   Principal Problem:    OCD (obsessive compulsive disorder)    Active Problem List:  Patient Active Problem List   Diagnosis   ? OCD (obsessive compulsive disorder)   ? Autism spectrum disorder   ? Panic disorder with agoraphobia   ? PTSD (post-traumatic stress disorder)          ASSESSMENT     This is a 41 y.o. male who is in the Autism spectrum which has exacerbated his OCD, anxiety and depression.  We spoke about taking things slowly and controlling what we can.  We will simplify the Prazosin to 1 mg HS for nightmares but continue the Pristiq-clomipramine cross taper.           HOSPITALIZATION SUMMARY   Daily Assessment:  7/5/2021: Change prazosin to 1 mg p.o. nightly D/C a.m. dose           PLAN   1. Ongoing education given regarding diagnostic and treatment options with risks, benefits and alternatives and adequate verbalization of understanding.  2. Continue medication management given beneficial response.   3.  to follow in regards to collecting and reviewing collateral information, referrals and disposition planning.    4.  Psychiatric Plan Of Care:        Medication changes:       Discontinue AM prazosin  5.  Substance Use Plan Of Care:        N/A    6.  Medical Plan Of Care:       N/A  7.  Legal Plan of Care       Legal Status: voluntary           8.  Risk Assessment & Milieu Plan of Care:       Risk Assessment: Patient on precautions       Encourage the patient to participate in unit activities.       Further treatment programming to be determined throughout the hospital course.    Coordination of Care:   Treatment Plan reviewed and physician signed, Care discussed with Care/Treatment Team Members, Chart reviewed, Patient seen and Care reviewed with family/caretakers    Re-Certification I certify that the inpatient psychiatric facility services furnished since the previous certification were, and continue to be, medically necessary for,  either, treatment which could reasonably be expected to improve the patients condition or diagnostic study and that the hospital records indicate that the services furnished were, either, intensive treatment services, admission and related services necessary for diagnostic study, or equivalent services.     I certify that the patient continues to need, on a daily basis, active treatment furnished directly by or requiring the supervision of inpatient psychiatric facility personnel.   I estimate 3-7 more days of hospitalization is necessary for proper treatment of the patient. My plans for post-hospital care for this patient are [facility] Care List: Home with follow-up     David Beard MD     -     7/5/2021     -     10:45 AM          Start time:  10:45 AM  Stop time:  11:20 AM    Total time  35 minutes with > 50% spent on coordination of cares and psycho-education.    This note was created with help of Dragon dictation system. Grammatical / typing errors are not intentional.    David Beard MD     ADDENDUM:  Coordination of care with Mother-patient not present  12:00 PM-12:35 PM    I spent 35 minutes speaking to the patient's mother, the patient was not present.  We discussed his past care and her concerns about tapering off the clomipramine.  Although the patient and his current provider do not think it has been helpful.  She states they have tried to go off of it multiple times and he decompensated severely each time.  The problem is that when they have tried to increase the dosage, he has had significant side effects.  I reassured her that I think the plan is good to add Pristiq into the medications as we cannot go higher on the clomipramine.  We will taper the Pristiq upward in the future and work on decreasing the clomipramine.  It does not necessarily have to be discontinued, if it proves that important. We will see how he responds.    His mother voiced her concerns about the  antihistamines used for anxiety and I agree that we should avoid them.  I explained that there are alternatives to these medications for anxiety.  They tried using prazosin and it was not effective for his general anxiety.  Another future option could be a trial of low dose baclofen for anxiety.  She voiced her concerns about being on benzodiazepines long term and this could be an alternative option. I also brought up ketamine as an option to try in the outpatient setting as Davion has not responded well to selective serotonin reuptake inhibitor's and antipsychotics.      She also inquires about maybe getting some of the OT and therapy exercises written down so he can take them on discharge and he could do them at home.  We discussed trying to get a more stable environment for him on discharge with services in place.  He has had a lot of problems with his outpatient services not showing up, etc.  Perhaps a supervised living facility when he has autonomy but staff present, if needed.  Discharge planning is crucial and once discharged they will have to get stable, reliable services in place.  The medications are helpful, but the long term gains will be made with therapy and proper programming.  David Beard MD

## 2021-07-06 NOTE — PLAN OF CARE
Plan of Care by Pacheco Arroyo RN at 7/6/2021  6:19 AM     Author: Pacheco Arroyo RN Service: -- Author Type: Registered Nurse    Filed: 7/6/2021  6:56 AM Date of Service: 7/6/2021  6:19 AM Status: Signed    : Pacheco Arroyo RN (Registered Nurse)       Patient was calm and slept most part of the shift. Safety checks were conducted to ensure patient's safety. No pain was reported and patient  denied most psych symptoms except for anxiety which he did not rate, and which had no PRN to be given except for his scheduled medication. No PRN was given, we will continue to monitor.

## 2021-07-06 NOTE — PROGRESS NOTES
"Progress Notes by Jeanne Aquino OT at 7/6/2021  2:15 PM     Author: Jeanne Aquino OT Service: -- Author Type: Occupational Therapist    Filed: 7/6/2021  3:40 PM Date of Service: 7/6/2021  2:15 PM Status: Signed    : Jeanne Aquino OT (Occupational Therapist)          07/06/21 1415   Engagement   Intervention Group   Topic Detail OT: Education regarding healthy distractions and creative hands on endeavor (fimo beads) to increase concentration, focus, attention to task/detail, planning, problem solving, coping with stress, task follow through, and creative expression   Patient Response Needs reinforcement/repetition to learn materials;Was respectful   Concentrated on Task 15 - 30 min   Cognition Goal-directed;Follows through with task   Mood / Affect  Anxious   Social/Behavioral  Cooperative;Engaged;Self-talk/hallucinating   Thought Content  Reality oriented  (Reality oriented to task and conversation)     Pt initially declined 1:1 invite. Pt arrived late to group and stayed for duration. Pt sat among peers to complete project and engaged in sporadic and brief social interactions with peers. Pt able to follow verbal directions and visual demonstrations; after each direction and demonstration, pt would verbalize the steps (\"First you roll the jennifer and then you twist the jennifer.\") Pt provided positive verbal support to peer who made a self-deprecating comment about her project. Pt reported he prefers groups that \"make things\" vs groups that are \"just talking\". Pt progressing towards goal.         "

## 2021-07-06 NOTE — PLAN OF CARE
Plan of Care by Suzanna Kaur OT at 7/6/2021  3:34 PM     Author: Suzanna Kaur OT Service: -- Author Type: Occupational Therapist    Filed: 7/6/2021  3:37 PM Date of Service: 7/6/2021  3:34 PM Status: Signed    : Suzanna Kaur OT (Occupational Therapist)       [facility]  Occupational Therapy    Weighted Brule    Data:  Pt set up with a 18 lb weighted blanket and given weighted blanket information handout.  Education and instruction were provided on the use of a weighted blanket which included purpose, how to use, precautions, and resources.  Pt had positive response when the blanket was applied in supine.  Pt was given other suggestions of how to use blanket while in supine and when seated.      Assessment:  Pt responded well to sensory input from weighted blanket and would benefit from exploring other sensory strategies and preferences.     Plan:  Continue to monitor and check for effectiveness, tolerance and use of weighted blanket. Monitor medical changes that may contraindicate use. Explore resources and options for purchasing, insurance coverage or making a weighted blanket.      Plan for next treatment:   follow up with patient for weighted blanket and sound machine effectiveness. Pt has a stress ball-will continue to monitor for further fidgets  Sound machine was provided by [facility] staff. Therapist provided further education on use of sound machine and coping skills/calming techniques.  Pt has the following items checked out: 18 lb weighted blanket    Therapist:Suzanna Kaur OT  Date:7/6/2021

## 2021-07-06 NOTE — PROGRESS NOTES
Progress Notes by Andra Ibarra LP at 2021 12:40 PM     Author: Andra Ibarra LP Service: -- Author Type: Psychologist    Filed: 2021  2:08 PM Date of Service: 2021 12:40 PM Status: Signed    : Andra Ibarra LP (Psychologist)       Psychology Psychotherapy  Note       Name:  Glenroy Adler  :  1980  MRN:  [id number]      Date: 2021  Duration: 40 minutes (12:40-1:20pm)     Target Symptoms:    The patient was seen in light of concerns regarding symptoms of severe depression, anxiety, and OCD.  Provider requested to have a daily schedule to be created for patient     Participation:  The patient was able to participate and benefit from treatment as evidenced by his verbal expression of ideas and initiation of topics discussed.     Mental Status:    Mood:  anxious and flat  Affect:  blunted  Motor activity: Within normal limits  Suicidal Ideation:  absent  Homicidal Ideation:  absent  Thought process:   Within normal limits  Thought content: Within normal limits  Attention/Concentration:  Within normal limits; Within normal limits  Language/Speech ability:  Within normal limits and Perseverative at times  Memory:  No evidence of impairment.  Insight and Judgement:  fair and limited  Orientation:  person, place, time and situation  Appearance: unkempt,, disheveled,, engaged, and dismissive,  Attire:  Scrubs  Grooming: Disheveled, overt body odor  Age: appears stated age  Eye Contact:  Avoidant, laying in bed, looking up at the ceiling  Estimated IQ:  average          Intervention:    Patient was in his room working on his menu when writer approached.  Patient was amenable to meeting and preferred to meet in his room.  Patient laid down in his bed and primarily stared at the ceiling during the session.  Patient displayed limited eye contact with writer despite the beds ability to be raised to engage in a face-to-face conversation.  Patient complains about appear  "though was able to verbalize that he understands everyone is here for different reasons and he needs to focus on himself.  Patient engages in a significant amount of externalizing and makes excuses as to why he is unable to engage in coping skills, shower, etc.  Patient acknowledged he is aware that he stinks but made numerous excuses as to why he cannot shower including difficulty with his \"tremors\" to hold the handle.  Patient had minimal to no tremors throughout the conversation.  Writer balanced validation and support with psychoeducation and attempting to encourage patient to engage in practice skills.  Patient remained quite dismissive of them as he does not have the \"mental capabilities\" citing memory, focus, etc.  Patient is willing to try the coping tools that were agreed upon between his mom and his nurse including a weighted blanket, a fidget spinner, and a noise machine.  Patient briefly mentioned some of these yesterday though was very dismissive.  It almost seems as though once his mother approves of it he is more willing to engage.  Patient was able to demonstrate insight that he cannot always call his mom for help despite her being the \"default\".  Patient stated he needs to learn to rely on the staff.  Writer validated this will also emphasizing the importance of relying on himself and his own skills to manage his emotions and the situation.  Patient once again was dismissive of this.  Writer walked patient through a grounding exercise of the 5 senses which she was able to actively participate in.  Writer encouraged him to practice using these skills and again reintroduced behavioral activation.  Patient stated that it is \"not easy\" to get up and do things.  Writer again validated patient and normalized that it is not easy and again reiterated the emphasis on forcing himself to engage in some behaviors and activities to slowly pull himself out of his depression.  Gave an example of even just trying " to splash some water on his face today.  Patient immediately went into how that would only exacerbate his eczema and he likely needs to shave his whole beard off.  Writer reiterated giving the patient the coping skill list and encouraged him to review it and try to practice some of the skills.  Will meet with him again on Thursday.     Psychoterapeutic Techniques: Interpersonal Psychotherapy, Cognitive-behavioral therapy, motivational interviewing and supportive psychotherapy strategies were utilized.     Necessity:    The session was necessary for the care of the patient to address symptoms of  severe depression, anxiety, and OCD.  Provider requested to have a daily schedule to be created for patient.     Progress:    Patient has shown minimal to no improvement in his ability to utilize coping skills to address symptoms of severe depression, anxiety, and OCD.  Provider requested to have a daily schedule to be created for patient     Plan:    This writer will continue to meet with the patient on a regular basis while in the hospital to address mental health symptoms by continuing to utilize cognitive-behavioral and supportive psychotherapy.     Diagnosis:    Depression  OCD  Autism Spectrum Disorder  PTSD  Panic Disorder with Agoraphobia       Provider:  Andra Ibarra PsyD, EAMON  Date: 7/6/2021

## 2021-07-06 NOTE — PLAN OF CARE
"Plan of Care by Dunia Wright RN at 7/6/2021  3:14 PM     Author: Dunia Wright RN Service: -- Author Type: Registered Nurse    Filed: 7/6/2021  3:14 PM Date of Service: 7/6/2021  3:14 PM Status: Signed    : Dunia Wright RN (Registered Nurse)         Problem: Pain  Goal: Patient's pain/discomfort is manageable  Outcome: Progressing    Pt denies any pain concerns at this time.      Problem: Safety  Goal: Patient will be injury free during hospitalization  Outcome: Progressing     Problem: Psychosocial Needs  Goal: Demonstrates ability to cope with hospitalization/illness  Outcome: Progressing    Pt was initially very agitated and upset this morning, and again just before lunch today. He was crying out, calling for his mother. Pt was perseverating on how overwhelmed he felt about the tasks for the day-kept repeating \"my brain is shutting down\", \"I want to be in bed.\"  Pt was difficult to redirect. He displays poor frustration tolerance. He rated anxiety 10/10 and depression 10/10 at that time. He placed several calls to his mother which appeared to agitate him further. Pt was offered a shower as he appears to have severe itching of his scalp and face, but he stated that he did not feel capable of showering due to his tremors. He skipped breakfast and returned to bed after getting his scheduled medications.     Pts mother called the unit and spoke with writer in length. She asked that staff try to limit the patients calls to her, to about 3 times daily if possible. She expressed that she would like Glenroy to learn some coping skills that he can use when he returns home.     Pt was provided with a sound machine and headphones. OT was consulted and will try to provide items such as a weighted blanket and fidgets for pt to use.     Pt has been much calmer this afternoon. He attended OT group.           "

## 2021-07-06 NOTE — PROGRESS NOTES
Progress Notes by Jeanne Aquino OT at 7/6/2021  9:30 AM     Author: Jeanne Aquino OT Service: -- Author Type: Occupational Therapist    Filed: 7/6/2021 11:45 AM Date of Service: 7/6/2021  9:30 AM Status: Signed    : Jeanne Aquino OT (Occupational Therapist)          07/06/21 0930   Engagement   Intervention Group   Topic Detail OT: Education regarding 8 Dimensions of Wellness and interactive activity (resistance band exercises & cognitive exercises) to increase physical wellness, concentration, focus, processing speed, memory recall, overall cognitive wellness, coping with stress, and social engagement   Attendance Did not attend     Pt did not attend after 1:1 invite. Pt appeared to be sleeping as he was lying in bed with his eyes closed; pt did not acknowledge therapist's invite.

## 2021-07-06 NOTE — PLAN OF CARE
"Plan of Care by Jovanna Day SW at 7/6/2021 11:04 AM     Author: Jovanna Day SW Service: -- Author Type:     Filed: 7/6/2021  3:55 PM Date of Service: 7/6/2021 11:04 AM Status: Signed    : Jovanna Day SW ()       Assessment/Intervention, Care Coordination and Current Symptoms:  Writer consulted with MD and checked-in with patient.   Writer received VM from patient's mother Cleo reporting concerns that patient will be discharged home with no services in place. Cleo reports concerns that patient is \"not coping well\" and notes that he reported he was not sleeping and needs support from staff on the overnight. Cleo states she would like patient to develop a list of coping strategies he can use in order to better manage his emotions as he has consistently been calling her over the phone. Cleo did note that patient has been less agitated then before he came to the hospital but does not feel that he is ready to discharge. Writer reviewed with care team and MD reports he spoke with patient's mother yesterday regarding medications. Writer explained to Cleo that writer has left a VM with patient's [facility] and will coordinate his new in home services with [facility].     Writer received a return call from [facility]. She reports patient's newly assigned Home Care service will be [facility]. They will be calling patient's mother Cleo for collateral. Writer attempted to obtain REMA from patient for [facility], however patient appeared overwhelmed by this, stating he thought he would not have to address this new service until he went home. Writer explained to patient that [facility] to discuss details of new plan upon return home. Per [facility] can begin services on Monday 7/11. Patient did not sign REMA at this time and although voicing stress, was able to remain calm. Patient had a weighted blanket and was utilizing a " stress ball. Writer provided patient with ear plugs as discussed as another possible coping tool.     Discharge Plan or Goal:   Return to home once services are in back in place (pt and his mother report he is supposed to be getting 24/7 services per [facility] assessment).       Barriers to Discharge:  Symptom stabilization, discharge planning      Referral Status:  None currently- [facility] has made referral for new agency for in-home services([facility])    Legal Status:   Tomás Day [facility], 7/6/2021, 11:04 AM

## 2021-07-06 NOTE — PROGRESS NOTES
Progress Notes by David Beard MD at 7/6/2021 12:05 PM     Author: David Beard MD Service: Psychiatry Author Type: Physician    Filed: 7/6/2021  6:31 PM Date of Service: 7/6/2021 12:05 PM Status: Addendum    : David Beard MD (Physician)    Related Notes: Original Note by David Beard MD (Physician) filed at 7/6/2021  6:30 PM       PSYCHIATRY  PROGRESS NOTE     DATE OF SERVICE   7/6/2021         INFORMATION REGARDING HOSPITALIZATION    Per 7/2/2021 H&P from Dr. Roche  This is a 41 y.o. male with history of OCD (obsessive compulsive disorder), patient is single, with no children domiciled by him self in a town home but he has a care team from a private company, patient is on disability; that was admitted to the psychiatric inpatient unit due to severe depression and inability to take care of himself due to multiple recent medication changes.     Today patient was seen and evaluated in the consult room with nurse practitioner present during the assessment, this was a face-to-face evaluation.  Patient presented during the assessment extremely anxious, perseverating on his medications and I did again not able to provide accurate information.  His thought process is very concrete and he clearly has multiple obsessions.  Patient was perseverating on him not receiving his clomipramine and prazosin last night.  I informed the patient that prior to starting any medications we need to verify with the pharmacy and I always like to meet with the patient is to make sure that the medications the pharmacy reviewed are correct.  At the end patient verbalized good understanding and was in agreement with proceeding with the assessment.     Patient said that he had a huge fall because one of the medications that he is outpatient provider prescribed.  He was supposed to take this medication as needed for anxiety (Periactin).  Last Monday at 6 in the morning was when he had his fall and  "patient thinks that the new medication \"screwed up with the melatonin\". He was drowsy, foggy, got up and he fell down the stairs.  Patient tells me that he agreed to come to the hospital because he needs some to do a thorough look at he is medications.  Patient was extremely anxious at the moment of the assessment and he was unable to fully talk about his symptoms and how he has been feeling at home.  Patient did reported that he is nervous, anxious, overlwhelmed, scared because he wants things to get better.  Patient verbalized having problems with anxiety and depression. Ever since the accident last Monday this has been worst. Has memory problems, sensory problems, concentration is poor and he even said that he has problems understanding the suzanne here in the unit.  Patient was very upset about not taking the clomipramine this morning but at the same time patient tells me that he doesn't think the Clomipramine has been helpful, and the outpatient psychiatrist has been thinking about taking it away.  Patient said that over the last few days the only medication that was helpful was the lorazepam that he received in the emergency room.  He has developed bad side effects to the hydroxyzine and does not want to take this medication anymore.  At this time patient denies having any thoughts about harming himself or harming others and he has been able to contract for safety.  Denies having any hallucinations and no delusions have been elicited.      When talking about medications patient tells me that he has to stay away form all selective serotonin reuptake inhibitor because they cause more harm.  He also has to stay away form antipsychotics, they do nothing and \"screw him up\" (EPS?).  Patient was not able to give me a list of prior medications use.he  believed that he took Abilify and this medication was not beneficial..      I was able to communicate with patient's mother Cleo over the phone with patient present " during the interview.  Mother was also very upset about the patient not being started on clomipramine last night.  I have informed the mother the reasons why this was not done.  First I needed for the pharmacy to review the medication and I needed to review the medication myself with the patient.  Mother was also upset about the staff in the evening and she was constantly asking if we do have nurses working in the unit.  I did confirmed with the mother that we do have a nurse working with the patient on every shift.       Mother reported that the prazosin and the antyhistamine cause more problems and made the patient very anxious. Lorazepam has been the best medication for him, it calms him down, and he is able to function.  Lorazepam is not causing any side effects. The hesitaiton with his outpatient provider to give him this medication is because is related to valium and they do not want to put him on anything that is addictive.  Patient was very used to take Valium as was dependant on it. In 2019 the outpatient providers started to taper down the Valium. Valium was completely discontinued on May 23, 2021.  Mother thinks that after the discontinuation of the Valium the anxiety and the panic started coming forward.  In addition to all of this patient has been experiencing problems with staffing.  There have been a lot of changes for the patient at home.  He does not have the same staff working with him consistently, this has caused a lot of anxiety and has increased patient's obsessive thinking.  Mother reported that he currently the patient doesn't want to get out of bed, is having panick attacks and doesn't want to face the problems with the staff. He can't prepare food for him self, has not shower in a month and half and he is unable to leave his house.      At that moment I discussed with the patient and mother the tentative treatment plan.  First I have placed a consult for the hospitalist to come and see the  patient as his blood pressure has been elevated.  Second I have sent a message to the psychologist in order to work with the patient in creating a behavioral plan here in the unit..  I will communicate with the outpatient provider and coordinate cares with her.  In the meantime given that both patient and mother reported that the patient has done well on lorazepam and I will put him on lorazepam 0.5 mg 3 times a day.  Both mother and patient have verbalized good understanding and they are in agreement with this plan.     After I spoke with her outpatient providers I went back to the unit and met with the patient.  I reviewed with the patient the information that he was provided by the outpatient provider.  I discussed with the patient the use of Pristiq including reasons for prescribing this, benefits, risk and side effects.  After clarifying patient's questions he informed me is in agreement with a trial of Pristiq.     As per outpatient provider:   Patient has 2 different records in care everywhere and he has 2 different addresses.     Outpatient provider Rosanna Flores informed me that patient has been experiencing problems with the ability to cope with live, change and his anxiety. Valium was discontinue and the patient tolerated this well. More recently the problems is staffing. Staff has been quiting and he has been without staff.  Patient had an [facility] assessment that said that he needs 24/7 assistance.  This has led to more panic, patient making coments to wanting to kill him self because he wants to have people around him.  This cycle that is unbreakable at this moment.  The anxiety is not undercontrol.  Mark informed me that she wanted to take him off Clomipramine as this has not work to treat his anxiety, but the patient is not willing to change the medication at this time or increase the dose of clonazepam. Patient is convinced any other medication will cause problems.  In the past when the patient  "gets a new medication he asked for the staff that works with him to google the medication for side effects for the parents will do that for him.  He had [facility] assessment done years ago.  Outpatient provider thinks that the patient can benefit from a trial of Pristiq.  She will be faxing the [facility] assessment and her last note.     Later on I did receive both and indeed in the [facility] assessment it said that Pristiq is a medication that can be tried on the patient.  On her progress note said that the patient has tried Prozac, Lexapro, Celexa, Remeron, trazodone, temazepam, clomipramine, Abilify, Seroquel, Zyprexa, Depakote, diazepam, lorazepam, hydroxyzine, clonidine, guanfacine, propanolol, propanolol LA, melatonin, Benadryl and l-methylfolate.       CHIEF COMPLAINT   \"my memory is gone\"       SUBJECTIVE/OBJECTIVE   Upon patient interview, patient reports that he is still very anxious and depressed.  He is emotionally labile and has many crying spells during the day.  He reports limited sleep, okay appetite.  He denies SI/HI/AH/VH today.  He acknowledges he needs a shower after I brought up that he should shower.  However he came up with many excuses why he cannot do this.  This is a pattern with pretty much all suggestions.  He is also calling his mother too much and she is getting frustrated with this.       Nursing:  Patient was calm and slept most part of the shift. Safety checks were conducted to ensure patient's safety. No pain was reported and patient  denied most psych symptoms except for anxiety which he did not rate, and which had no PRN to be given except for his scheduled medication. No PRN was given, we will continue to monitor.     :  Assessment/Intervention, Care Coordination and Current Symptoms:  Writer consulted with MD and checked-in with patient.   Writer received VM from patient's mother Cleo reporting concerns that patient will be discharged home with no services in " "place. Cleo reports concerns that patient is \"not coping well\" and notes that he reported he was not sleeping and needs support from staff on the overnight. Cleo states she would like patient to develop a list of coping strategies he can use in order to better manage his emotions as he has consistently been calling her over the phone. Cleo did note that patient has been less agitated then before he came to the hospital but does not feel that he is ready to discharge. Writer reviewed with care team and MD reports he spoke with patient's mother yesterday regarding medications. Writer explained to Cleo that writer has left a VM with patient's [facility] and will coordinate his new in home services with [facility].      Writer received a return call from [facility]. She reports patient's newly assigned Home Care service will be [facility]. They will be calling patient's mother Cleo for collateral. Writer attempted to obtain REMA from patient for [facility], however patient appeared overwhelmed by this, stating he thought he would not have to address this new service until he went home. Writer explained to patient that [facility] to discuss details of new plan upon return home. Per [facility] can begin services on Monday 7/11. Patient did not sign REMA at this time and although voicing stress, was able to remain calm. Patient had a weighted blanket and was utilizing a stress ball. Writer provided patient with ear plugs as discussed as another possible coping tool.      Discharge Plan or Goal:   Return to home once services are in back in place (pt and his mother report he is supposed to be getting 24/7 services per [facility] assessment).        Psychiatry:  Glenroy continues to be very needy and is crying multiple times during the day.  He states this is from anxiety.  I encouraged him to take a shower.  He gave me many excuses why he cannot.  He reports that his " mood and anxiety are so overwhelming and he cannot calm down.  Usually his mother's voice calms him down but he has been calling her too much and she is frustrated with this and limiting his calls.  He intellectualizes everything and is very concrete to any suggestions.  I will increase the Lorazepam to 1 mg po three times a day temporarily to help ease the increase of Pristiq to 50 mg tomorrow and to maybe calm him down a little so he can make some progress. I agree with Dr. Ibarra that a daily schedule would be a good idea.  A weighted balnket may help.      CONSULTATIONS   Psychology Psychotherapy  Note        Name:  Glenroy Adler  :  1980  MRN:  [id number]        Date: 2021  Duration: 40 minutes (12:40-1:20pm)     Target Symptoms:    The patient was seen in light of concerns regarding symptoms of severe depression, anxiety, and OCD.  Provider requested to have a daily schedule to be created for patient     Participation:  The patient was able to participate and benefit from treatment as evidenced by his verbal expression of ideas and initiation of topics discussed.     Mental Status:    Mood:  anxious and flat  Affect:  blunted  Motor activity: Within normal limits  Suicidal Ideation:  absent  Homicidal Ideation:  absent  Thought process:   Within normal limits  Thought content: Within normal limits  Attention/Concentration:  Within normal limits; Within normal limits  Language/Speech ability:  Within normal limits and Perseverative at times  Memory:  No evidence of impairment.  Insight and Judgement:  fair and limited  Orientation:  person, place, time and situation  Appearance: unkempt,, disheveled,, engaged, and dismissive,  Attire:  Scrubs  Grooming: Disheveled, overt body odor  Age: appears stated age  Eye Contact:  Avoidant, laying in bed, looking up at the ceiling  Estimated IQ:  average           Intervention:    Patient was in his room working on his menu when writer approached.   "Patient was amenable to meeting and preferred to meet in his room.  Patient laid down in his bed and primarily stared at the ceiling during the session.  Patient displayed limited eye contact with writer despite the beds ability to be raised to engage in a face-to-face conversation.  Patient complains about appear though was able to verbalize that he understands everyone is here for different reasons and he needs to focus on himself.  Patient engages in a significant amount of externalizing and makes excuses as to why he is unable to engage in coping skills, shower, etc.  Patient acknowledged he is aware that he stinks but made numerous excuses as to why he cannot shower including difficulty with his \"tremors\" to hold the handle.  Patient had minimal to no tremors throughout the conversation.  Writer balanced validation and support with psychoeducation and attempting to encourage patient to engage in practice skills.  Patient remained quite dismissive of them as he does not have the \"mental capabilities\" citing memory, focus, etc.  Patient is willing to try the coping tools that were agreed upon between his mom and his nurse including a weighted blanket, a fidget spinner, and a noise machine.  Patient briefly mentioned some of these yesterday though was very dismissive.  It almost seems as though once his mother approves of it he is more willing to engage.  Patient was able to demonstrate insight that he cannot always call his mom for help despite her being the \"default\".  Patient stated he needs to learn to rely on the staff.  Writer validated this will also emphasizing the importance of relying on himself and his own skills to manage his emotions and the situation.  Patient once again was dismissive of this.  Writer walked patient through a grounding exercise of the 5 senses which she was able to actively participate in.  Writer encouraged him to practice using these skills and again reintroduced behavioral " "activation.  Patient stated that it is \"not easy\" to get up and do things.  Writer again validated patient and normalized that it is not easy and again reiterated the emphasis on forcing himself to engage in some behaviors and activities to slowly pull himself out of his depression.  Gave an example of even just trying to splash some water on his face today.  Patient immediately went into how that would only exacerbate his eczema and he likely needs to shave his whole beard off.  Writer reiterated giving the patient the coping skill list and encouraged him to review it and try to practice some of the skills.  Will meet with him again on Thursday.     Psychoterapeutic Techniques: Interpersonal Psychotherapy, Cognitive-behavioral therapy, motivational interviewing and supportive psychotherapy strategies were utilized.     Necessity:    The session was necessary for the care of the patient to address symptoms of  severe depression, anxiety, and OCD.  Provider requested to have a daily schedule to be created for patient.     Progress:    Patient has shown minimal to no improvement in his ability to utilize coping skills to address symptoms of severe depression, anxiety, and OCD.  Provider requested to have a daily schedule to be created for patient     Plan:    This writer will continue to meet with the patient on a regular basis while in the hospital to address mental health symptoms by continuing to utilize cognitive-behavioral and supportive psychotherapy.     Diagnosis:    Depression  OCD  Autism Spectrum Disorder  PTSD  Panic Disorder with Agoraphobia        Provider:  Andra Ibarra PsyD, LP  Date: 7/6/2021           Allergies     Patient has no known allergies.    MEDICATIONS     Scheduled Meds:  ? clomiPRAMINE  25 mg Oral DAILY   ? clomiPRAMINE  50 mg Oral QHS   ? [START ON 7/7/2021] desvenlafaxine succinate  50 mg Oral DAILY   ? LORazepam  1 mg Oral TID   ? prazosin  1 mg Oral QHS       PRN " "Meds:  acetaminophen, aluminum-magnesium hydroxide-simethicone, hydrALAZINE, melatonin, polyethylene glycol    Medication adherence: Reviewed risk/benefits of medication , Patient able to verbalize understanding of side effects  and Patient verbally consents to taking medications  Medication side effects: none  Benefit from medications: Not yet       ROS   Pertinent items are noted in HPI.         MENTAL STATUS EXAM   Vitals: BP (!) 144/97   Pulse (!) 102   Temp 98.4  F (36.9  C) (Oral)   Resp 19   Ht 5' 9\" (1.753 m)   Wt 202 lb 9 oz (91.9 kg)   SpO2 98%   BMI 29.91 kg/m        Level of Consciousness:  alert and oriented  Appearance:  Disheveled, poor eye contact  Attitude:  cooperative and polite  Speech:  Within normal   Regular rate, rhythm, volume and tone  Language ability: intact  Mood:  \" Very anxious\"  Affect: flat but anxious  Suicidal Ideation: absent  Homicidal Ideation: absent  Thought formation: concrete  Thought content: Obsession, very perseverative  Fundamentals of Knowledge: Sufficient  Attention/Concentration: okay  Memory: intact.   Insight and Judgement: impaired judgment and impaired insight  Orientation: person, place, time and situation  Psychomotor Behavior: WNL  Muscle Strength and Tone: normal  Gait and Station: intact       LABS   personally reviewed.   No results found for this or any previous visit (from the past 48 hour(s)).  No results found for: PHENYTOIN, PHENOBARB, VALPROATE, CBMZ       IMAGING     No new Imaging       DIAGNOSIS   Principal Problem:    OCD (obsessive compulsive disorder)    Active Problem List:  Patient Active Problem List   Diagnosis   ? OCD (obsessive compulsive disorder)   ? Autism spectrum disorder   ? Panic disorder with agoraphobia   ? PTSD (post-traumatic stress disorder)   ? Generalized anxiety disorder   ? Major depressive disorder, recurrent episode, moderate (H)   ? 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 male who is in the Autism spectrum which has exacerbated his OCD, anxiety and depression.  We spoke about taking things slowly and controlling what we can.      Glenroy continues to be very needy and is crying multiple times during the day.  He states this is from anxiety.  I encouraged him to take a shower.  He gave me many excuses why he cannot.  He reports that his mood and anxiety are so overwhelming and he cannot calm down.  Usually his mother's voice calms him down but he has been calling her too much and she is frustrated with this and limiting his calls.  He intellectualizes everything and is very concrete to any suggestions.  I will increase the Lorazepam to 1 mg po three times a day temporarily to help ease the increase of Pristiq to 50 mg tomorrow and to maybe calm him down a little so he can make some progress.  I agree with Dr. Ibarra that a daily schedule would be a good idea.  A weighted blanket may help.     HOSPITALIZATION SUMMARY   Daily Assessment:  7/5/2021: Change prazosin to 1 mg p.o. nightly D/C a.m. dose  7/6/2021:  Increase Lorazepam to 1 mg po three times a day; weighted blanket;  Increase Pristiq to 50 mg tomorrow AM         PLAN   1. Ongoing education given regarding diagnostic and treatment options with risks, benefits and alternatives and adequate verbalization of understanding.  2. Continue medication management given beneficial response.   3.  to follow in regards to collecting and reviewing collateral information, referrals and disposition planning.    4.  Psychiatric Plan Of Care:        Medication changes:       Increase Pristiq to 50 mg tomorrow  5.  Substance Use Plan Of Care:        N/A    6.  Medical Plan Of Care:       N/A  7.  Legal Plan of Care       Legal Status: voluntary           8.  Risk Assessment & Milieu Plan of Care:       Risk Assessment: Patient on precautions       Encourage the patient to participate in unit activities.       Further  treatment programming to be determined throughout the hospital course.    Coordination of Care:   Treatment Plan reviewed and physician signed, Care discussed with Care/Treatment Team Members, Chart reviewed, Patient seen and Care reviewed with family/caretakers    Re-Certification I certify that the inpatient psychiatric facility services furnished since the previous certification were, and continue to be, medically necessary for, either, treatment which could reasonably be expected to improve the patients condition or diagnostic study and that the hospital records indicate that the services furnished were, either, intensive treatment services, admission and related services necessary for diagnostic study, or equivalent services.     I certify that the patient continues to need, on a daily basis, active treatment furnished directly by or requiring the supervision of inpatient psychiatric facility personnel.   I estimate 3-7 more days of hospitalization is necessary for proper treatment of the patient. My plans for post-hospital care for this patient are [facility] Care List: Home with follow-up     David Beard MD     -     7/6/2021     -     12:05 PM          Start time:  12:05 PM  Stop time:  12:30 PM    Total time 25 minutes with > 50% spent on coordination of cares and psycho-education.    This note was created with help of Dragon dictation system. Grammatical / typing errors are not intentional.    David Beard MD

## 2021-07-06 NOTE — PROGRESS NOTES
Progress Notes by Andra Ibarra LP at 7/6/2021 10:18 AM     Author: Andra Ibarra LP Service: -- Author Type: Psychologist    Filed: 7/6/2021 10:48 AM Date of Service: 7/6/2021 10:18 AM Status: Signed    : Andra Ibarra LP (Psychologist)       Attempted to meet with patient but he was asleep in bed.  Will attempt later.

## 2021-07-07 PROCEDURE — 999N001212 HC REV CODE 9999 CHARGE CONVERSION OPNP

## 2021-07-07 NOTE — PROGRESS NOTES
Progress Notes by Lex Mckeon at 7/6/2021  1:00 PM     Author: Lex Mckeon Service: Psychology Author Type: Psychology Intern    Filed: 7/6/2021  2:11 PM Date of Service: 7/6/2021  1:00 PM Status: Attested    : Lex Mckeon (Psychology Intern) Cosigner: Andra Ibarra LP at 7/7/2021  2:15 PM    **Sensitive Note**    Attestation signed by Andra Ibarra LP at 7/7/2021  2:15 PM    I have read, discussed and agree with the documentation provided by  Mal Mckeon MA, Psychology Intern.     Andra Ibarra PsyD, LP                         7/06/21  1:00PM   Engagement   Intervention Group   Topic DBT Skills   Topic Detail Mindfulness   Attendance Did to attend. Engaged with his psychologist at start of group.

## 2021-07-07 NOTE — PROGRESS NOTES
Progress Notes by Suzanna Kaur OT at 7/7/2021  1:39 PM     Author: Suzanna Kaur OT Service: -- Author Type: Occupational Therapist    Filed: 7/7/2021  1:44 PM Date of Service: 7/7/2021  1:39 PM Status: Signed    : Suzanna Kaur OT (Occupational Therapist)       Follow up provided for weighted blanket and fidgets.  Pt shared he trialed the 18lb weighted blanket for approx 20-30 minutes yesterday. Pt shared he removed blanket when he became hot. Therapist provided education on use of weighted blanket. Pt shared his feedback and knowledge of weighted blankets for people with ASD diagnosis. Pt agreeable to continue using 18 lb weighted blanket, pt will trial shorter time frames and alternating positioning to observe effects and ability to provide calming. Pt provided with two smaller fidgets as well.  Therapist and patient agreed to follow up at end of the week.    Suzanna Kaur OT  07/07/21

## 2021-07-07 NOTE — PROGRESS NOTES
Progress Notes by Yolie Toscano SW at 7/7/2021  1:54 PM     Author: Yolie Toscano SW Service: -- Author Type:     Filed: 7/7/2021  1:54 PM Date of Service: 7/7/2021  1:54 PM Status: Signed    : Yolie Toscano SW ()          07/07/21 1353   Engagement   Intervention Group   Topic Coping with stress;Symptom management;Insight development/self-awareness   Topic Detail Anger Management   Attendance Did not attend

## 2021-07-07 NOTE — PLAN OF CARE
"Plan of Care by Jovanna Day SW at 7/7/2021  9:11 AM     Author: Jovanna Day SW Service: -- Author Type:     Filed: 7/7/2021  3:27 PM Date of Service: 7/7/2021  9:11 AM Status: Signed    : Jovanna Day SW ()       Assessment/Intervention, Care Coordination and Current Symptoms:  Writer consulted with MD and care team and called patient's mother Cleo with update. Cleo reports she believes patient is making some progress due to him taking a shower last evening. Cleo states she received a call from [facility] regarding patient but had additional questions about what services they would provide and how many hours that writer was unable to answer. Writer encouraged her to coordinate with patient's CADI CM. Cleo reports concerns about patient's progress \"backsliding\" if he is not set up with the right home care organization. Cleo also asked about patient's medication regiment and if patient will be continuing on current anxiety medications at home(Lorazepam) as she feels these medications are beneficial to patient, however his outpatient psychiatry provider has been \"really against the benzos, and really into antihistamines even though they made him fall\". Cleo reports [facility].     Writer LVM with patient's CADI CM Marine requesting call to patient's mother to discuss specifics of in-home services. At this time pt has not signed REMA for in home service provider due to feeling overwhelmed when talking about this service.  Writer received return call from Marine who reports patient's mother did not indicate any concerns about the in-home services when she spoke with her. Marine will follow up with Cleo. Writer informed Marine that patient's MD has indicated that pt will most likely not be ready for discharge on Monday and to hold off on setting up services for Monday. Marine will " need to know discharge date ahead of time in order to have the services scheduled.     Discharge Plan or Goal:  Return home with in home services      Barriers to Discharge:  Symptom stabilization, medication management, discharge planning      Referral Status:  Pt CADI CM has referred pt to [facility] for in home services.       Legal Status:  Tomás    Jovanna Yuenbins [facility], 7/7/2021, 9:11 AM

## 2021-07-07 NOTE — PROGRESS NOTES
"Progress Notes by David Beard MD at 7/7/2021 10:00 AM     Author: David Beard MD Service: Psychiatry Author Type: Physician    Filed: 7/7/2021  4:50 PM Date of Service: 7/7/2021 10:00 AM Status: Signed    : David Beard MD (Physician)       PSYCHIATRY  PROGRESS NOTE     DATE OF SERVICE   7/7/2021         INFORMATION REGARDING HOSPITALIZATION    Per 7/2/2021 H&P from Dr. Roche  This is a 41 y.o. male with history of OCD (obsessive compulsive disorder), patient is single, with no children domiciled by him self in a town home but he has a care team from a private company, patient is on disability; that was admitted to the psychiatric inpatient unit due to severe depression and inability to take care of himself due to multiple recent medication changes.     Today patient was seen and evaluated in the consult room with nurse practitioner present during the assessment, this was a face-to-face evaluation.  Patient presented during the assessment extremely anxious, perseverating on his medications and I did again not able to provide accurate information.  His thought process is very concrete and he clearly has multiple obsessions.  Patient was perseverating on him not receiving his clomipramine and prazosin last night.  I informed the patient that prior to starting any medications we need to verify with the pharmacy and I always like to meet with the patient is to make sure that the medications the pharmacy reviewed are correct.  At the end patient verbalized good understanding and was in agreement with proceeding with the assessment.     Patient said that he had a huge fall because one of the medications that he is outpatient provider prescribed.  He was supposed to take this medication as needed for anxiety (Periactin).  Last Monday at 6 in the morning was when he had his fall and patient thinks that the new medication \"screwed up with the melatonin\". He was drowsy, foggy, got up and " "he fell down the stairs.  Patient tells me that he agreed to come to the hospital because he needs some to do a thorough look at he is medications.  Patient was extremely anxious at the moment of the assessment and he was unable to fully talk about his symptoms and how he has been feeling at home.  Patient did reported that he is nervous, anxious, overlwhelmed, scared because he wants things to get better.  Patient verbalized having problems with anxiety and depression. Ever since the accident last Monday this has been worst. Has memory problems, sensory problems, concentration is poor and he even said that he has problems understanding the suzanne here in the unit.  Patient was very upset about not taking the clomipramine this morning but at the same time patient tells me that he doesn't think the Clomipramine has been helpful, and the outpatient psychiatrist has been thinking about taking it away.  Patient said that over the last few days the only medication that was helpful was the lorazepam that he received in the emergency room.  He has developed bad side effects to the hydroxyzine and does not want to take this medication anymore.  At this time patient denies having any thoughts about harming himself or harming others and he has been able to contract for safety.  Denies having any hallucinations and no delusions have been elicited.      When talking about medications patient tells me that he has to stay away form all selective serotonin reuptake inhibitor because they cause more harm.  He also has to stay away form antipsychotics, they do nothing and \"screw him up\" (EPS?).  Patient was not able to give me a list of prior medications use.he  believed that he took Abilify and this medication was not beneficial..      I was able to communicate with patient's mother Cleo over the phone with patient present during the interview.  Mother was also very upset about the patient not being started on clomipramine last " night.  I have informed the mother the reasons why this was not done.  First I needed for the pharmacy to review the medication and I needed to review the medication myself with the patient.  Mother was also upset about the staff in the evening and she was constantly asking if we do have nurses working in the unit.  I did confirmed with the mother that we do have a nurse working with the patient on every shift.       Mother reported that the prazosin and the antyhistamine cause more problems and made the patient very anxious. Lorazepam has been the best medication for him, it calms him down, and he is able to function.  Lorazepam is not causing any side effects. The hesitaiton with his outpatient provider to give him this medication is because is related to valium and they do not want to put him on anything that is addictive.  Patient was very used to take Valium as was dependant on it. In 2019 the outpatient providers started to taper down the Valium. Valium was completely discontinued on May 23, 2021.  Mother thinks that after the discontinuation of the Valium the anxiety and the panic started coming forward.  In addition to all of this patient has been experiencing problems with staffing.  There have been a lot of changes for the patient at home.  He does not have the same staff working with him consistently, this has caused a lot of anxiety and has increased patient's obsessive thinking.  Mother reported that he currently the patient doesn't want to get out of bed, is having panick attacks and doesn't want to face the problems with the staff. He can't prepare food for him self, has not shower in a month and half and he is unable to leave his house.      At that moment I discussed with the patient and mother the tentative treatment plan.  First I have placed a consult for the hospitalist to come and see the patient as his blood pressure has been elevated.  Second I have sent a message to the psychologist in order  to work with the patient in creating a behavioral plan here in the unit..  I will communicate with the outpatient provider and coordinate cares with her.  In the meantime given that both patient and mother reported that the patient has done well on lorazepam and I will put him on lorazepam 0.5 mg 3 times a day.  Both mother and patient have verbalized good understanding and they are in agreement with this plan.     After I spoke with her outpatient providers I went back to the unit and met with the patient.  I reviewed with the patient the information that he was provided by the outpatient provider.  I discussed with the patient the use of Pristiq including reasons for prescribing this, benefits, risk and side effects.  After clarifying patient's questions he informed me is in agreement with a trial of Pristiq.     As per outpatient provider:   Patient has 2 different records in care everywhere and he has 2 different addresses.     Outpatient provider Rosanna Flores informed me that patient has been experiencing problems with the ability to cope with live, change and his anxiety. Valium was discontinue and the patient tolerated this well. More recently the problems is staffing. Staff has been quiting and he has been without staff.  Patient had an [facility] assessment that said that he needs 24/7 assistance.  This has led to more panic, patient making coments to wanting to kill him self because he wants to have people around him.  This cycle that is unbreakable at this moment.  The anxiety is not undercontrol.  Mark informed me that she wanted to take him off Clomipramine as this has not work to treat his anxiety, but the patient is not willing to change the medication at this time or increase the dose of clonazepam. Patient is convinced any other medication will cause problems.  In the past when the patient gets a new medication he asked for the staff that works with him to google the medication for side effects  "for the parents will do that for him.  He had [facility] assessment done years ago.  Outpatient provider thinks that the patient can benefit from a trial of Pristiq.  She will be faxing the [employer] assessment and her last note.     Later on I did receive both and indeed in the [employer] assessment it said that Pristiq is a medication that can be tried on the patient.  On her progress note said that the patient has tried Prozac, Lexapro, Celexa, Remeron, trazodone, temazepam, clomipramine, Abilify, Seroquel, Zyprexa, Depakote, diazepam, lorazepam, hydroxyzine, clonidine, guanfacine, propanolol, propanolol LA, melatonin, Benadryl and l-methylfolate.       CHIEF COMPLAINT   \"I'm still really anxious\"       SUBJECTIVE/OBJECTIVE   Upon patient interview, patient reports that he is still very anxious and depressed.  He is emotionally labile and was crying because he missed his mother.  He was completely out of sorts as he had a messy BM this AM.  He presents completely helpless and very obsessive and concrete.  He reports okaysleep, okay appetite.  He denies SI/HI/AH/VH today.  He did take a shower yesterday and I tried praising him for this but he dismissed this and focused on everything that is wrong.  He continues to dismiss and reject all advice and  Suggestions.    Multidisciplinary team meeting was held today.  See comprehensive multidisciplinary treatment plan for full details.      Nursing:  Pt is observed sleeping comfortably during all safety checks. No signs or symptoms or pain or discomfort noted. Will continue with same plan of care.    :  Assessment/Intervention, Care Coordination and Current Symptoms:  Writer consulted with MD and care team and called patient's mother Cleo with update. Cleo reports she believes patient is making some progress due to him taking a shower last evening. Cleo states she received a call from [facility] regarding patient but had additional questions about " "what services they would provide and how many hours that writer was unable to answer. Writer encouraged her to coordinate with patient's CADI CM. Cleo reports concerns about patient's progress \"backsliding\" if he is not set up with the right home care organization. Cleo also asked about patient's medication regiment and if patient will be continuing on current anxiety medications at home(Lorazepam) as she feels these medications are beneficial to patient, however his outpatient psychiatry provider has been \"really against the benzos, and really into antihistamines even though they made him fall\". Cleo reports she has [facility] for a new psychiatry provider.      Writer LVM with patient's CADI CM Marine requesting call to patient's mother to discuss specifics of in-home services. At this time pt has not signed REMA for in home service provider due to feeling overwhelmed when talking about this service.  Writer received return call from Marine who reports patient's mother did not indicate any concerns about the in-home services when she spoke with her. Marine will follow up with Cleo. Writer informed Marine that patient's MD has indicated that pt will most likely not be ready for discharge on Monday and to hold off on setting up services for Monday. Marine will need to know discharge date ahead of time in order to have the services scheduled.      Discharge Plan or Goal:  Return home with in home services       Psychiatry:  Glenroy continues to be very needy and is crying often on the unit.  He has a very dependent personality.  He is also fixated on his diagnoses and dismisses every recommendation I have due to his medical/psychiatric diagnoses.  I tried re framing what I was saying but he he is stuck on what he cannot do, secondary to whatever condition he is focusing on.  I spoke about getting a routine and some structure and he states he has one that involves " "waking up and taking pills and basically doing nothing.  I tried explaining that he is identifying only by his diagnoses and not as a person with interests etc.  He was upset and overwhelmed today because he had a messy BM.  I had him use the weighted blanket when he was upset.  It seemed to help a little.  He questioned why he has to change his routine- I bluntly stated- his way is not working and he needs to try something different.     CONSULTATIONS   No new consultations           Allergies     Patient has no known allergies.    MEDICATIONS     Scheduled Meds:  ? clomiPRAMINE  25 mg Oral DAILY   ? clomiPRAMINE  50 mg Oral QHS   ? desvenlafaxine succinate  50 mg Oral DAILY   ? LORazepam  1 mg Oral TID   ? prazosin  1 mg Oral QHS       PRN Meds:  acetaminophen, aluminum-magnesium hydroxide-simethicone, hydrALAZINE, melatonin, polyethylene glycol    Medication adherence: Reviewed risk/benefits of medication , Patient able to verbalize understanding of side effects  and Patient verbally consents to taking medications  Medication side effects: none  Benefit from medications: Not yet       ROS   Pertinent items are noted in HPI.         MENTAL STATUS EXAM   Vitals: /80 (Patient Position: Standing)   Pulse (!) 109   Temp 97.4  F (36.3  C) (Oral)   Resp 18   Ht 5' 9\" (1.753 m)   Wt 202 lb 9 oz (91.9 kg)   SpO2 96%   BMI 29.91 kg/m        Level of Consciousness:  alert and oriented  Appearance:  Disheveled, poor eye contact  Attitude:  cooperative and polite  Speech:  Within normal   Regular rate, rhythm, volume and tone  Language ability: intact  Mood:  \" not good\"  Affect:emotionally labile and overwhelmed  Suicidal Ideation: absent  Homicidal Ideation: absent  Thought formation: concrete  Thought content: Obsession, very perseverative  Fundamentals of Knowledge: Sufficient  Attention/Concentration: okay  Memory: intact.   Insight and Judgement: impaired judgment and impaired insight  Orientation: person, " place, time and situation  Psychomotor Behavior: WNL  Muscle Strength and Tone: normal  Gait and Station: intact       LABS   personally reviewed.   No results found for this or any previous visit (from the past 48 hour(s)).  No results found for: PHENYTOIN, PHENOBARB, VALPROATE, CBMZ       IMAGING     No new Imaging       DIAGNOSIS   Principal Problem:    OCD (obsessive compulsive disorder)    Active Problem List:  Patient Active Problem List   Diagnosis   ? OCD (obsessive compulsive disorder)   ? Autism spectrum disorder   ? Panic disorder with agoraphobia   ? PTSD (post-traumatic stress disorder)   ? Generalized anxiety disorder   ? Major depressive disorder, recurrent episode, moderate (H)   ? 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 male who is in the Autism spectrum which has exacerbated his OCD, anxiety and depression.  We spoke about taking things slowly and controlling what we can.      Glenroy continues to be very needy and is crying often on the unit.  He has a very dependent personality.  He is also fixated on his diagnoses and dismisses every recommendation I have due to his medical/psychiatric diagnoses.  I tried re framing what I was saying but he he is stuck on what he cannot do, secondary to whatever condition he is focusing on.  I spoke about getting a routine and some structure and he states he has one that involves waking up and taking pills and basically doing nothing.  I tried explaining that he is identifying only by his diagnoses and not as a person with interests etc.  He was upset and overwhelmed today because he had a messy BM.  I had him use the weighted blanket when he was upset.  It seemed to help a little.  He questioned why he has to change his routine- I bluntly stated- his way is not working and he needs to try something different.     HOSPITALIZATION SUMMARY   Daily Assessment:  7/5/2021: Change prazosin to 1 mg p.o. nightly D/C a.m.  dose  7/6/2021:  Increase Lorazepam to 1 mg po three times a day; weighted blanket;  Increase Pristiq to 50 mg tomorrow AM  7/7/2021:  Increase Pristiq to 50 mg          PLAN   1. Ongoing education given regarding diagnostic and treatment options with risks, benefits and alternatives and adequate verbalization of understanding.  2. Continue medication management given beneficial response.   3.  to follow in regards to collecting and reviewing collateral information, referrals and disposition planning.    4.  Psychiatric Plan Of Care:        Medication changes:       Increase Pristiq to 50 mg today  5.  Substance Use Plan Of Care:        N/A    6.  Medical Plan Of Care:       N/A  7.  Legal Plan of Care       Legal Status: voluntary           8.  Risk Assessment & Milieu Plan of Care:       Risk Assessment: Patient on precautions       Encourage the patient to participate in unit activities.       Further treatment programming to be determined throughout the hospital course.    Coordination of Care:   Treatment Plan reviewed and physician signed, Care discussed with Care/Treatment Team Members, Chart reviewed, Patient seen and Care reviewed with family/caretakers    Re-Certification I certify that the inpatient psychiatric facility services furnished since the previous certification were, and continue to be, medically necessary for, either, treatment which could reasonably be expected to improve the patients condition or diagnostic study and that the hospital records indicate that the services furnished were, either, intensive treatment services, admission and related services necessary for diagnostic study, or equivalent services.     I certify that the patient continues to need, on a daily basis, active treatment furnished directly by or requiring the supervision of inpatient psychiatric facility personnel.   I estimate 3-7 more days of hospitalization is necessary for proper treatment of the  patient. My plans for post-hospital care for this patient are [facility] Care List: Home with follow-up     David Beard MD     -     7/7/2021     -     10:00AM          Start time:  10:00 AM  Stop time:  10:35 AM    Total time 35 minutes with > 50% spent on coordination of cares and psycho-education.    This note was created with help of Dragon dictation system. Grammatical / typing errors are not intentional.    David Beard MD

## 2021-07-07 NOTE — PROGRESS NOTES
Progress Notes by Jeanne Aquino OT at 7/7/2021  9:30 AM     Author: Jeanne Aquino OT Service: -- Author Type: Occupational Therapist    Filed: 7/7/2021 11:28 AM Date of Service: 7/7/2021  9:30 AM Status: Signed    : Jeanne Aquino OT (Occupational Therapist)          07/07/21 0930   Engagement   Intervention Group   Topic Detail OT: Education about 8 Dimensions of Wellness and interactive social activity (Lea Ante) to increase social wellness, reminiscing, sharing about oneself, listening to others, getting to know other people, memory recall, and social engagement   Attendance Did not attend   Reason for Not Attending Refused   Mood / Affect  Sad   Thought Content  Perseverative     Pt did not attend after 1:1 invite. Pt was lying in bed crying and talking loudly in an inaudible manner; per RN report pt was crying because he was unable to speak with his mother. Pt able to stop crying to discuss with therapist his intention to attend group this afternoon and why he arrived late to group yesterday.

## 2021-07-07 NOTE — PLAN OF CARE
"Plan of Care by Dunia Wright RN at 7/7/2021  2:25 PM     Author: Dunia Wright RN Service: -- Author Type: Registered Nurse    Filed: 7/7/2021  2:25 PM Date of Service: 7/7/2021  2:25 PM Status: Signed    : Dunia Wright RN (Registered Nurse)         Problem: Pain  Goal: Patient's pain/discomfort is manageable  Outcome: Progressing    PRN Tylenol given for c/o headache rated 5/10.      Problem: Safety  Goal: Patient will be injury free during hospitalization  Outcome: Progressing     Problem: Psychosocial Needs  Goal: Demonstrates ability to cope with hospitalization/illness  Outcome: Progressing    Pt has been more emotionally and behaviorally regulated this shift. Pt states he slept better last night but is \"exhausted from self care (shower)\". Has had 2 episodes of crying out, once after speaking with his mother on the phone. He spent most of the morning in his room, declined groups except for afternoon OT group. He ate both breakfast and lunch today.  He is social with select peers and staff. He shared about some of his favorite music and movies. He put a puzzle together independently in the dayroom this afternoon.   Medication compliant, except did not receive his Clomipramine dose this am because pharmacy did not have the 25mg dose in stock.   Pt rates anxiety 5/10, depression 3/10. Denies SI/HI/SIB.        "

## 2021-07-07 NOTE — TREATMENT PLAN
Treatment Plan by Jovanna Day SW at 7/7/2021  4:06 PM     Author: Jovanna Day SW Service: -- Author Type:     Filed: 7/7/2021  4:09 PM Date of Service: 7/7/2021  4:06 PM Status: Signed    : Jovanna Day SW ()       Comprehensive Multidisciplinary Treatment Plan    Date: 7/7/2021       Treatment Plan:  Updated Treatment Plan    Patient Name:  Glenroy Adler       Medical Record Number: [id number]   YOB: 1980  Admit Date:  7/1/2021    Substantiated Diagnosis:   Patient Active Problem List   Diagnosis   ? OCD (obsessive compulsive disorder)   ? Autism spectrum disorder   ? Panic disorder with agoraphobia   ? PTSD (post-traumatic stress disorder)   ? Generalized anxiety disorder   ? Major depressive disorder, recurrent episode, moderate (H)   ? 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      STG #1 addressing barrier to discharge: Anxiety     GOAL:  Verbalize thoughts and feelings associated with:    INTERVENTIONS: 1. Assess and re-assess patient's level of risk (As needed) 2. Encourage patient to express feelings, fears, frustrations, hopes (As needed) 3. Identify and monitor triggers for panic/anxiety symptoms  GOAL:  Reduce average anxiety levels:    INTERVENTIONS: 1. Learn and practice relaxation techniques and other coping strategies 2. Identify and monitor triggers for panic/anxiety symptoms GOAL:  Attend and participate in unit activities, including therapeutic, recreational, and educational groups :    INTERVENTIONS: Provide therapeutic and educational activities daily, encourage attendance and participation, and document same in the medical record (As needed)   Responsible discipline to address this goal: Registered Nurse, , Provider, Occupational  Therapy and Behavior Tech   Target Date:  07/09/2021    STG#2 addressing barrier to discharge: Depression      GOAL:  Verbalize thoughts and feelings associated with :  INTERVENTIONS: 1. Assess and re-assess patient's level of risk  (As needed)   2. Engage patient in 1:1 interactions for a minimum of 15 minutes each session  (As needed)   3. Encourage patient to express feelings, fears, frustrations, hopes  (As needed)  GOAL:  Refrain from harming self     INTERVENTIONS:   1. Monitor patient closely, per order  (As needed)   2. Supervise medication ingestion, monitor effects and side effects  (As needed)  GOAL: Refrain from isolation     INTERVENTIONS:   1. Develop a trusting relationship  (As needed)   2. Encourage socialization  (As needed)  GOAL: Attend and participate in unit activities, including therapeutic, recreational, and educational groups    INTERVENTIONS: Provide therapeutic and educational activities daily, encourage attendance and participation, and document same in the medical record  (As needed)  GOAL: Refrain from self-neglect by completing daily ADLs, including personal hygiene independently, as able    INTERVENTIONS:   1. Observe, teach, and assist patient with ADLs  (As needed)   2. Monitor and promote a balance of rest/activity, with adequate nutrition and elimination  (As needed)   Responsible discipline to address this goal: Registered Nurse, , Provider, Occupational Therapy and Behavior Tech  Target Date:  07/09/2021    Patient Involvement:   [x] Yes   [] No     [] NA  Patient involved in treatment plan development   [x] Yes   [] No     [] NA  Family and/or significant other involved in treatment plan development   [x] Yes   [] No     [] NA  Patient concurs with treatment plan    Active Participation and Responsibility in Treatment Regimen: Pt is engaged in unit program offered.  Pt is cooperative with unit expectations.  Pt is medication compliant.        Use of seclusion  and/or restraints is a frequent occurrence: No    Is the patient reporting concerns regarding pain or medical issues? None reported    If yes, how are those concerns being addressed: N/A    Anticipated Discharge Date: TBD  Discharge Plan: Home with outpatient supports    Providers present at review:  Title:  Present:  Initials  RN      [x] Yes   [] No   RH    SW   [x] Yes   [] No   AR    Provider  [x] Yes   [] No   BS    OT   [x] Yes   [] No   RF     Pharmacist  [] Yes   [x] No   MTK    Psychologist   [] Yes   [x] No      SHAWN Suggs

## 2021-07-07 NOTE — PLAN OF CARE
Plan of Care by Carlos Sellers RN at 7/6/2021  9:53 PM     Author: Carlos Sellers RN Service: -- Author Type: Registered Nurse    Filed: 7/6/2021  9:53 PM Date of Service: 7/6/2021  9:53 PM Status: Signed    : Carlos Sellers RN (Registered Nurse)         Problem: Pain  Goal: Patient's pain/discomfort is manageable  Outcome: Progressing     Problem: Safety  Goal: Patient will be injury free during hospitalization  Outcome: Progressing     Problem: Daily Care  Goal: Daily care needs are met  Outcome: Progressing     Problem: Psychosocial Needs  Goal: Demonstrates ability to cope with hospitalization/illness  Outcome: Progressing     Patient denies suicidal and homicidal ideation and contracts for safety. He rated anxiety at 2 and depression 7 but denied all other psych symptoms. Patient spent the majority of time isolated in his room. He took a shower this evening.

## 2021-07-07 NOTE — PLAN OF CARE
Plan of Care by Getachew Fernandes RN at 7/7/2021  6:25 AM     Author: Getachew Fernandes RN Service: -- Author Type: Registered Nurse    Filed: 7/7/2021  6:25 AM Date of Service: 7/7/2021  6:25 AM Status: Signed    : Getachew Fernandes RN (Registered Nurse)         Problem: Pain  Goal: Patient's pain/discomfort is manageable  Outcome: Progressing     Problem: Daily Care  Goal: Daily care needs are met  Outcome: Progressing     Problem: Psychosocial Needs  Goal: Demonstrates ability to cope with hospitalization/illness  Outcome: Progressing   Pt is observed sleeping comfortably during all safety checks. No signs or symptoms or pain or discomfort noted. Will continue with same plan of care.

## 2021-07-07 NOTE — PROGRESS NOTES
Progress Notes by Jeanne Aquino OT at 7/7/2021  2:15 PM     Author: Jeanne Aquino OT Service: -- Author Type: Occupational Therapist    Filed: 7/7/2021  4:01 PM Date of Service: 7/7/2021  2:15 PM Status: Signed    : Jeanne Aquino OT (Occupational Therapist)          07/07/21 1415   Engagement   Intervention Group   Topic Detail OT: Education regarding healthy distractions and creative hands on endeavor (fimo bead project) to increase concentration, focus, attention to task/detail, planning, problem solving, coping with stress, task follow through, and creative expression   Attendance Attended   Patient Response Demonstrated understanding of materials provided;Was respectful   Concentrated on Task duration of group   Cognition Goal-directed;Follows through with task   Mood / Affect  Anxious;Pleasant;Content   Social/Behavioral  Cooperative;Engaged   Thought Content  Reality oriented     Pt sat among peers to complete project and engaged in brief social interactions with peers. Pt initially struggled with decision making but was able to make decisions after therapist placed a time limit on how long her could take to make his decision. Pt able to use model to complete steps of project and asked for assistance when needed.

## 2021-07-08 PROCEDURE — 999N001212 HC REV CODE 9999 CHARGE CONVERSION OPNP

## 2021-07-08 NOTE — PROGRESS NOTES
"Progress Notes by David Beard MD at 7/8/2021  1:30 PM     Author: David Beard MD Service: Psychiatry Author Type: Physician    Filed: 7/8/2021  3:01 PM Date of Service: 7/8/2021  1:30 PM Status: Signed    : David Beard MD (Physician)       PSYCHIATRY  PROGRESS NOTE     DATE OF SERVICE   7/8/2021         INFORMATION REGARDING HOSPITALIZATION    Per 7/2/2021 H&P from Dr. Roche  This is a 41 y.o. male with history of OCD (obsessive compulsive disorder), patient is single, with no children domiciled by him self in a town home but he has a care team from a private company, patient is on disability; that was admitted to the psychiatric inpatient unit due to severe depression and inability to take care of himself due to multiple recent medication changes.     Today patient was seen and evaluated in the consult room with nurse practitioner present during the assessment, this was a face-to-face evaluation.  Patient presented during the assessment extremely anxious, perseverating on his medications and I did again not able to provide accurate information.  His thought process is very concrete and he clearly has multiple obsessions.  Patient was perseverating on him not receiving his clomipramine and prazosin last night.  I informed the patient that prior to starting any medications we need to verify with the pharmacy and I always like to meet with the patient is to make sure that the medications the pharmacy reviewed are correct.  At the end patient verbalized good understanding and was in agreement with proceeding with the assessment.     Patient said that he had a huge fall because one of the medications that he is outpatient provider prescribed.  He was supposed to take this medication as needed for anxiety (Periactin).  Last Monday at 6 in the morning was when he had his fall and patient thinks that the new medication \"screwed up with the melatonin\". He was drowsy, foggy, got up and " "he fell down the stairs.  Patient tells me that he agreed to come to the hospital because he needs some to do a thorough look at he is medications.  Patient was extremely anxious at the moment of the assessment and he was unable to fully talk about his symptoms and how he has been feeling at home.  Patient did reported that he is nervous, anxious, overlwhelmed, scared because he wants things to get better.  Patient verbalized having problems with anxiety and depression. Ever since the accident last Monday this has been worst. Has memory problems, sensory problems, concentration is poor and he even said that he has problems understanding the suzanne here in the unit.  Patient was very upset about not taking the clomipramine this morning but at the same time patient tells me that he doesn't think the Clomipramine has been helpful, and the outpatient psychiatrist has been thinking about taking it away.  Patient said that over the last few days the only medication that was helpful was the lorazepam that he received in the emergency room.  He has developed bad side effects to the hydroxyzine and does not want to take this medication anymore.  At this time patient denies having any thoughts about harming himself or harming others and he has been able to contract for safety.  Denies having any hallucinations and no delusions have been elicited.      When talking about medications patient tells me that he has to stay away form all selective serotonin reuptake inhibitor because they cause more harm.  He also has to stay away form antipsychotics, they do nothing and \"screw him up\" (EPS?).  Patient was not able to give me a list of prior medications use.he  believed that he took Abilify and this medication was not beneficial..      I was able to communicate with patient's mother Cleo over the phone with patient present during the interview.  Mother was also very upset about the patient not being started on clomipramine last " night.  I have informed the mother the reasons why this was not done.  First I needed for the pharmacy to review the medication and I needed to review the medication myself with the patient.  Mother was also upset about the staff in the evening and she was constantly asking if we do have nurses working in the unit.  I did confirmed with the mother that we do have a nurse working with the patient on every shift.       Mother reported that the prazosin and the antyhistamine cause more problems and made the patient very anxious. Lorazepam has been the best medication for him, it calms him down, and he is able to function.  Lorazepam is not causing any side effects. The hesitaiton with his outpatient provider to give him this medication is because is related to valium and they do not want to put him on anything that is addictive.  Patient was very used to take Valium as was dependant on it. In 2019 the outpatient providers started to taper down the Valium. Valium was completely discontinued on May 23, 2021.  Mother thinks that after the discontinuation of the Valium the anxiety and the panic started coming forward.  In addition to all of this patient has been experiencing problems with staffing.  There have been a lot of changes for the patient at home.  He does not have the same staff working with him consistently, this has caused a lot of anxiety and has increased patient's obsessive thinking.  Mother reported that he currently the patient doesn't want to get out of bed, is having panick attacks and doesn't want to face the problems with the staff. He can't prepare food for him self, has not shower in a month and half and he is unable to leave his house.      At that moment I discussed with the patient and mother the tentative treatment plan.  First I have placed a consult for the hospitalist to come and see the patient as his blood pressure has been elevated.  Second I have sent a message to the psychologist in order  to work with the patient in creating a behavioral plan here in the unit..  I will communicate with the outpatient provider and coordinate cares with her.  In the meantime given that both patient and mother reported that the patient has done well on lorazepam and I will put him on lorazepam 0.5 mg 3 times a day.  Both mother and patient have verbalized good understanding and they are in agreement with this plan.     After I spoke with her outpatient providers I went back to the unit and met with the patient.  I reviewed with the patient the information that he was provided by the outpatient provider.  I discussed with the patient the use of Pristiq including reasons for prescribing this, benefits, risk and side effects.  After clarifying patient's questions he informed me is in agreement with a trial of Pristiq.     As per outpatient provider:   Patient has 2 different records in care everywhere and he has 2 different addresses.     Outpatient provider Rosanna Flores informed me that patient has been experiencing problems with the ability to cope with live, change and his anxiety. Valium was discontinue and the patient tolerated this well. More recently the problems is staffing. Staff has been quiting and he has been without staff.  Patient had an [facility] assessment that said that he needs 24/7 assistance.  This has led to more panic, patient making coments to wanting to kill him self because he wants to have people around him.  This cycle that is unbreakable at this moment.  The anxiety is not undercontrol.  Mark informed me that she wanted to take him off Clomipramine as this has not work to treat his anxiety, but the patient is not willing to change the medication at this time or increase the dose of clonazepam. Patient is convinced any other medication will cause problems.  In the past when the patient gets a new medication he asked for the staff that works with him to google the medication for side effects  "for the parents will do that for him.  He had [facility] assessment done years ago.  Outpatient provider thinks that the patient can benefit from a trial of Pristiq.  She will be faxing the [facility] assessment and her last note.     Later on I did receive both and indeed in the [facility] assessment it said that Pristiq is a medication that can be tried on the patient.  On her progress note said that the patient has tried Prozac, Lexapro, Celexa, Remeron, trazodone, temazepam, clomipramine, Abilify, Seroquel, Zyprexa, Depakote, diazepam, lorazepam, hydroxyzine, clonidine, guanfacine, propanolol, propanolol LA, melatonin, Benadryl and l-methylfolate.       CHIEF COMPLAINT   \"still anxious\"       SUBJECTIVE/OBJECTIVE   Upon patient interview, patient reports that he is stillanxious and depressed. His anxiety seems to be worse in the AM.  He reports his sleep is still not great.   He does not have any prns for anxiety and we discussed options.  We also had a good conversation about guinea pigs as he wants to get a few.  He has had a few in the past.  He seems a little better overall as I have seen him more on the unit and he is crying less.  He still has a lot of lability and crying spells.  He denies SI./HI/AH/VH.  No issues with appetite.  No new physical complaints today.        Nursing: Patient was calm and slept all night. Safety checks were done every 15 minutes. No pain was reported and patient denied having anxiety, depression and hallucination. We will continue to monitor. No PRN was given.    :  Assessment/Intervention, Care Coordination and Current Symptoms:  Writer consulted with MD and care team and called patient's mother Cleo with update. Cleo reports she believes patient is making some progress due to him taking a shower last evening. Cleo states she received a call from [facility] regarding patient but had additional questions about what services they would provide and how many " "hours that writer was unable to answer. Writer encouraged her to coordinate with patient's [facility] CM. Cleo reports concerns about patient's progress \"backsliding\" if he is not set up with the right home care organization. Cleo also asked about patient's medication regiment and if patient will be continuing on current anxiety medications at home(Lorazepam) as she feels these medications are beneficial to patient, however his outpatient psychiatry provider has been \"really against the benzos, and really into antihistamines even though they made him fall\". Cleo reports she [facility] about looking for a new psychiatry provider.      Writer LVM with patient's [facility] CM Marine requesting call to patient's mother to discuss specifics of in-home services. At this time pt has not signed REMA for in home service provider due to feeling overwhelmed when talking about this service.  Writer received return call from Marine who reports patient's mother did not indicate any concerns about the in-home services when she spoke with her. Marine will follow up with Cleo. Writer informed Marine that patient's MD has indicated that pt will most likely not be ready for discharge on Monday and to hold off on setting up services for Monday. Marine will need to know discharge date ahead of time in order to have the services scheduled.      Discharge Plan or Goal:  Return home with in home services       Psychiatry:  Glenroy appears to have some minimal improvement overall.  His anxiety is worse in the morning and sometimes in the evening.  I will start baclofen 2.5 mg twice daily as needed.  This is a good option as his outpatient provider does not want to do benzodiazepines.  This medication works in the same receptors as the benzodiazepines and is not habit forming.  Perhaps we could titrate this to a higher dosage and eventually get him off of the Ativan, if he tolerates the baclofen.  I do not want " "to make any changes hastily, so this will have to be a long process.  He was inquiring about getting a repeat dose of melatonin if he cannot sleep but I do not want to start 2 different medications on the same day.  He seems to be somewhat distracted when we talk about guinea pigs so this is a good way to get him out of his head as far as anxiety.  He is a little upset because one of his friends on the unit is leaving today.     CONSULTATIONS   No new consultations           Allergies     Patient has no known allergies.    MEDICATIONS     Scheduled Meds:  ? clomiPRAMINE  25 mg Oral DAILY   ? clomiPRAMINE  50 mg Oral QHS   ? desvenlafaxine succinate  50 mg Oral DAILY   ? LORazepam  1 mg Oral TID   ? prazosin  1 mg Oral QHS       PRN Meds:  acetaminophen, aluminum-magnesium hydroxide-simethicone, baclofen, hydrALAZINE, melatonin, polyethylene glycol    Medication adherence: Reviewed risk/benefits of medication , Patient able to verbalize understanding of side effects  and Patient verbally consents to taking medications  Medication side effects: none  Benefit from medications: minimal       ROS   Pertinent items are noted in HPI.         MENTAL STATUS EXAM   Vitals: BP (!) 146/99 (Patient Position: Sitting)   Pulse 99   Temp 98.5  F (36.9  C) (Oral)   Resp 16   Ht 5' 9\" (1.753 m)   Wt 202 lb 9 oz (91.9 kg)   SpO2 99%   BMI 29.91 kg/m        Level of Consciousness:  alert and oriented  Appearance:  Disheveled, poor eye contact  Attitude:  cooperative and polite  Speech:  Within normal   Regular rate, rhythm, volume and tone  Language ability: intact  Mood:  \" Still anxious\"  Affect: Calm her overall but still has emotional outbursts and crying  Suicidal Ideation: absent  Homicidal Ideation: absent  Thought formation: concrete  Thought content: Obsession, very perseverative  Fundamentals of Knowledge: Sufficient  Attention/Concentration: okay  Memory: intact.   Insight and Judgement: impaired judgment and impaired " insight  Orientation: person, place, time and situation  Psychomotor Behavior: WNL  Muscle Strength and Tone: normal  Gait and Station: intact       LABS   personally reviewed.   No results found for this or any previous visit (from the past 48 hour(s)).  No results found for: PHENYTOIN, PHENOBARB, VALPROATE, CBMZ       IMAGING     No new Imaging       DIAGNOSIS   Principal Problem:    OCD (obsessive compulsive disorder)    Active Problem List:  Patient Active Problem List   Diagnosis   ? OCD (obsessive compulsive disorder)   ? Autism spectrum disorder   ? Panic disorder with agoraphobia   ? PTSD (post-traumatic stress disorder)   ? Generalized anxiety disorder   ? Major depressive disorder, recurrent episode, moderate (H)   ? 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 male who is in the Autism spectrum which has exacerbated his OCD, anxiety and depression.  We spoke about taking things slowly and controlling what we can.      Glenroy appears to have some minimal improvement overall.  His anxiety is worse in the morning and sometimes in the evening.  I will start baclofen 2.5 mg twice daily as needed.  This is a good option as his outpatient provider does not want to do benzodiazepines.  This medication works in the same receptors as the benzodiazepines and is not habit forming.  Perhaps we could titrate this to a higher dosage and eventually get him off of the Ativan, if he tolerates the baclofen.  I do not want to make any changes hastily, so this will have to be a long process.  He was inquiring about getting a repeat dose of melatonin if he cannot sleep but I do not want to start 2 different medications on the same day.  He seems to be somewhat distracted when we talk about guinea pigs so this is a good way to get him out of his head as far as anxiety.  He is a little upset because one of his friends on the unit is leaving today.     HOSPITALIZATION SUMMARY   Daily  Assessment:  7/5/2021: Change prazosin to 1 mg p.o. nightly D/C a.m. dose  7/6/2021:  Increase Lorazepam to 1 mg po three times a day; weighted blanket;  Increase Pristiq to 50 mg tomorrow AM  7/7/2021:  Increase Pristiq to 50 mg   7/8/2021:  Start baclofen 2.5 mg po two times a day, as needed, for anxiety         PLAN   1. Ongoing education given regarding diagnostic and treatment options with risks, benefits and alternatives and adequate verbalization of understanding.  2. Continue medication management given beneficial response.   3.  to follow in regards to collecting and reviewing collateral information, referrals and disposition planning.    4.  Psychiatric Plan Of Care:        Medication changes:       Start baclofen 2.5 mg po two times a day, as needed, for anxiety  5.  Substance Use Plan Of Care:        N/A    6.  Medical Plan Of Care:       N/A  7.  Legal Plan of Care       Legal Status: voluntary           8.  Risk Assessment & Milieu Plan of Care:       Risk Assessment: Patient on precautions       Encourage the patient to participate in unit activities.       Further treatment programming to be determined throughout the hospital course.    Coordination of Care:   Treatment Plan reviewed and physician signed, Care discussed with Care/Treatment Team Members, Chart reviewed, Patient seen and Care reviewed with family/caretakers    Re-Certification I certify that the inpatient psychiatric facility services furnished since the previous certification were, and continue to be, medically necessary for, either, treatment which could reasonably be expected to improve the patients condition or diagnostic study and that the hospital records indicate that the services furnished were, either, intensive treatment services, admission and related services necessary for diagnostic study, or equivalent services.     I certify that the patient continues to need, on a daily basis, active treatment furnished  directly by or requiring the supervision of inpatient psychiatric facility personnel.   I estimate 3-7 more days of hospitalization is necessary for proper treatment of the patient. My plans for post-hospital care for this patient are [facility] Care List: Home with follow-up     David Beard MD     -     7/8/2021     -     1:30 PM          Start time:  1:30 PM  Stop time:  1:45 PM    Total time 15 minutes with > 50% spent on coordination of cares and psycho-education.    This note was created with help of Dragon dictation system. Grammatical / typing errors are not intentional.    David Beard MD

## 2021-07-08 NOTE — PLAN OF CARE
Plan of Care by Pacheco Arroyo RN at 7/8/2021  6:10 AM     Author: Pacheco Arroyo RN Service: -- Author Type: Registered Nurse    Filed: 7/8/2021  6:36 AM Date of Service: 7/8/2021  6:10 AM Status: Signed    : Pacheco Arroyo RN (Registered Nurse)       Patient was calm and slept all night. Safety checks were done every 15 minutes. No pain was reported and patient denied having anxiety, depression and hallucination. We will continue to monitor. No PRN was given.

## 2021-07-08 NOTE — PLAN OF CARE
Plan of Care by Leana Churchill RN at 7/8/2021  6:32 PM     Author: Leana Churchill RN Service: -- Author Type: Registered Nurse    Filed: 7/8/2021  6:34 PM Date of Service: 7/8/2021  6:32 PM Status: Signed    : Leana Churchill RN (Registered Nurse)         Problem: Safety  Goal: Patient will be injury free during hospitalization  7/8/2021 1832 by Leana Churchill RN  Outcome: Progressing  7/8/2021 1155 by Leana Churchill RN  Outcome: Progressing     Problem: Daily Care  Goal: Daily care needs are met  7/8/2021 1832 by Leana Churchill RN  Outcome: Progressing  7/8/2021 1155 by Leana Churchill RN  Outcome: Progressing     Problem: Psychosocial Needs  Goal: Demonstrates ability to cope with hospitalization/illness  7/8/2021 1832 by Leana Churchill RN  Outcome: Progressing  7/8/2021 1155 by Leana Churchill RN  Outcome: Not Progressing     Problem: Potential for Falls  Goal: Patient will remain free of falls  7/8/2021 1832 by Leana Churchill RN  Outcome: Progressing  7/8/2021 1155 by Leana Churchill RN  Outcome: Progressing     Pt was observed as anxious, but pleasant and cooperative. He rated depression 4/10 and anxiety 2/10. He denies SI/HI/hallucinations and contracts for safety. Pt denies pain. Thought process was linear and organized. Alert and oriented x4. Speech is appropriate.  No PRNs given this shift. No behaviors noted this shift, pt remains on falls precautions for safety. Pt ambulates independently and steadily without use of assistive devices.      Pt. Uncooperative with masking on the unit, and does not display respiratory symptoms.   Will continue to monitor

## 2021-07-08 NOTE — PROGRESS NOTES
Progress Notes by Suzanna Kaur OT at 7/8/2021 11:06 AM     Author: Suzanna Kaur OT Service: -- Author Type: Occupational Therapist    Filed: 7/8/2021 11:06 AM Date of Service: 7/8/2021 11:06 AM Status: Signed    : Suzanna Kaur OT (Occupational Therapist)          07/08/21 1106   Engagement   Intervention Group   Topic Detail OT Wellness group-exercise bingo/wellness collage for social engagement, insight, processing, symptom management, physical movement, healthy distraction and following directions   Attendance Did not attend   Reason for Not Attending Refused

## 2021-07-08 NOTE — PLAN OF CARE
Plan of Care by Leana Churchill RN at 7/8/2021  3:04 PM     Author: Leana Churchill RN Service: -- Author Type: Registered Nurse    Filed: 7/8/2021  3:04 PM Date of Service: 7/8/2021  3:04 PM Status: Signed    : Leana Churchill RN (Registered Nurse)         Problem: Safety  Goal: Patient will be injury free during hospitalization  Outcome: Progressing     Problem: Daily Care  Goal: Daily care needs are met  Outcome: Progressing     Problem: Potential for Falls  Goal: Patient will remain free of falls  Outcome: Progressing     Problem: Psychosocial Needs  Goal: Demonstrates ability to cope with hospitalization/illness  Outcome: Not Progressing     Pt was observed in room for the majority of the shift. In the beginning of shift, pt was crying and screaming and stating that he was afraid of another peer on the unit who was agitated.  Made a phone call to mother and was very tearful. Pt encouraged to use deep breathing. Afterwards, pt was observed as pleasant and cooperative. He rated depression 3/10 and anxiety 5/10. He denies SI/HI/hallucinations and contracts for safety. Pt denies pain. Thought process was linear and organized. Alert and oriented x4. Speech is appropriate.  No PRNs given this shift. Crying behaviors noted this shift, pt remains on falls precautions for safety. Pt ambulates independently and steadily without use of assistive devices.     Pt. Uncooperative with masking on the unit, and does not display respiratory symptoms.   Will continue to monitor.

## 2021-07-08 NOTE — PLAN OF CARE
Plan of Care by Jessica Walters RN at 7/7/2021 10:58 PM     Author: Jessica Walters RN Service: -- Author Type: Registered Nurse    Filed: 7/7/2021 10:58 PM Date of Service: 7/7/2021 10:58 PM Status: Signed    : Jessica Walters RN (Registered Nurse)         Problem: Pain  Goal: Patient's pain/discomfort is manageable  Outcome: Progressing     Problem: Safety  Goal: Patient will be injury free during hospitalization  Outcome: Progressing     Problem: Psychosocial Needs  Goal: Demonstrates ability to cope with hospitalization/illness  Outcome: Progressing     Problem: Potential for Falls  Goal: Patient will remain free of falls  Outcome: Progressing    Pt visible on the unit, engaged with selective peers. Rated anxiety 3/10, Depression 5/10 and generalized pain 5/10. Denied SI/HI and other psych symptoms. Compliant with medications and contracted for safety.

## 2021-07-08 NOTE — PLAN OF CARE
Plan of Care by Jovanna Day SW at 7/8/2021 10:19 AM     Author: Jovanna Day SW Service: -- Author Type:     Filed: 7/8/2021  3:24 PM Date of Service: 7/8/2021 10:19 AM Status: Signed    : Jovanna Day SW ()       Assessment/Intervention, Care Coordination and Current Symptoms:  Writer received call from patient's mother Cleo requesting update on possible discharge date in order to inform in home service providers. Writer provided update per MD that patient will most likely not be ready for discharge Monday and may possibly discharge later next week, however discharge date is not yet determined. Cleo states she will update in home providers. Cleo states she is waiting for return call from patient's [facility]  regarding specifics about dates and services for the new agency. She has also reached out to the new agency [facility] to discuss.   Cleo also brought up her concern that patient appears to do worse in the mornings and is questioning what this could be attributed to.     Writer spoke with patient's [facility] CM [place] yesterday and she will contact patient to inform him of staffing changes.     Writer received call from patient's  CM Jamie requesting update. Writer provided update to Jamie informing him of writer's coordination with patient's mother and [facility] . Jamie reports he had planned to meet with patient early next week but will most likely reschedule when patient discharges from the hospital.     Writer attempted to meet with patient x3 on this date, however patient was meeting with various providers or in group. Writer will follow up tomorrow.       Discharge Plan or Goal:  Return home, in home services      Barriers to Discharge:  Sx stabilization, medication management, discharge planning      [facility] Status[facility]  [facility] for in [facility] services.       Legal Status:  Vol    Important contacts:  Jamie Rubio:  [facility] with [place]: [phone]  [facility] with [place]:  [phone]  [facility] [place]: [phone]    Jovanna Day [facility], 7/8/2021, 10:19 AM

## 2021-07-08 NOTE — PROGRESS NOTES
Progress Notes by Suzanna Kaur OT at 7/8/2021  3:35 PM     Author: Suzanna Kaur OT Service: -- Author Type: Occupational Therapist    Filed: 7/8/2021  3:39 PM Date of Service: 7/8/2021  3:35 PM Status: Signed    : Suzanna Kaur OT (Occupational Therapist)          07/08/21 1535   Engagement   Intervention Group   Topic Detail OT Creative Expressions group-soapmaking for focus, direction following, problem solving, social engagement, healthy distraction and symptom management   Attendance Attended   Patient Response Accepted feedback;Needs reinforcement/repetition to learn materials;Asked questions and/or took notes   Concentrated on Task duration of group   Cognition Goal-directed;Preoccupied   Mood / Affect  Content   Social/Behavioral  Cooperative   Thought Content  [place]   Pt actively engaged in group activities. Pt demonstrated good frustration tolerance during project instructions by going with the flow as group was not following written instructions completely. Pt asked therapist for new bags twice due to patient having issues with ink pad and stamps. Pt was able to complete projects during group time. Pt chose a goat's milk base due to skin issues. Pt shared his knowledge of essential oils with his peers readily.

## 2021-07-09 ENCOUNTER — TRANSFERRED RECORDS (OUTPATIENT)
Dept: HEALTH INFORMATION MANAGEMENT | Facility: CLINIC | Age: 41
End: 2021-07-09

## 2021-07-09 LAB — SARS-COV-2 PCR RESULT-HE - HISTORICAL: NEGATIVE

## 2021-07-09 PROCEDURE — 87635 SARS-COV-2 COVID-19 AMP PRB: CPT

## 2021-07-09 PROCEDURE — 999N001212 HC REV CODE 9999 CHARGE CONVERSION OPNP

## 2021-07-09 NOTE — PLAN OF CARE
Plan of Care by Adam Crowder RN at 7/9/2021  2:01 PM     Author: Adam Crowder RN Service: -- Author Type: Registered Nurse    Filed: 7/9/2021  2:05 PM Date of Service: 7/9/2021  2:01 PM Status: Signed    : Adam Crowder RN (Registered Nurse)         Problem: Pain  Goal: Patient's pain/discomfort is manageable  Outcome: Progressing     Problem: Safety  Goal: Patient will be injury free during hospitalization  Outcome: Progressing    Patient denied pain, rated anxiety 3 and depression a 5, and denied S/I, H/I and hallucinations. Patient was calm, pleasant, cooperative. Prn baclofen given this morning for anxiety with fair effect. Patient was out of his room some for non meal time and participated in group.

## 2021-07-09 NOTE — PLAN OF CARE
Plan of Care by Davilmar, Claude, RN at 7/9/2021  6:12 AM     Author: Davilmar, Claude, RN Service: -- Author Type: Registered Nurse    Filed: 7/9/2021  6:13 AM Date of Service: 7/9/2021  6:12 AM Status: Signed    : Davilmar, Claude, RN (Registered Nurse)       Problem: Pain  Goal: Patient's pain/discomfort is manageable  Outcome: Progressing   Problem: Safety  Goal: Patient will be injury free during hospitalization  Outcome: Progressing   Patient denies pain. He slept for all of the night. Q 15 minutes safety checks is ongoing per unit policy. No behaviors noted. He sleeps more than 6 hrs of sleep. Pt is still sleeping.  Claude Davilmar, RN

## 2021-07-09 NOTE — PROGRESS NOTES
"Progress Notes by Andra Ibarra LP at 2021  8:03 AM     Author: Andra Ibarra LP Service: -- Author Type: Psychologist    Filed: 2021  7:59 AM Date of Service: 2021  8:03 AM Status: Signed    : Andra Ibarra LP (Psychologist)       Psychology Psychotherapy  Note       Name:  Glenroy Adler  :  1980  MRN:  [id number]      Date: 2021  Duration:  38 minutes (10:17-10:55am)     Target Symptoms:    The patient was seen in light of concerns regarding symptoms of severe depression, anxiety, and OCD.  Provider requested to have a daily schedule to be created for patient.     Participation:  The patient was able to participate and benefit from treatment as evidenced by his verbal expression of ideas and initiation of topics discussed.     Mental Status:    Mood:  anxious  Affect:  anxious and some rapid breathing initially  Motor activity: Within normal limits-laying on bed staring at ceiling  Suicidal Ideation:  absent  Homicidal Ideation:  absent  Thought process:   [place], perverates and obsesses on specific issues  Thought content: obsessional, perseverates  Attention/Concentration:  Within normal limits; Within normal limits  Language/Speech ability:  Rapid at times, then back to WNL  Memory:  No evidence of impairment.  Insight and Judgement:  limited  Orientation:  person, place, time and situation  Appearance: casually-dressed,  and dismissive,  Attire:  Appropriate  Grooming: Well-groomed  Age: appears stated age  Eye Contact:  Avoidant-stares at ceiling as he is laying on his bed  Estimated IQ:  average          Intervention:    Writer approached patient after he had briefly exited his room to give something back to his nurse and was then standing in the doorway leaning against it as if he were about to lose balance  Patient made some vague comments and declined to meet in the office because he was not going to go by \"her\" (a female peer who he perseverates " "on).  Patient complained about the peer and her aggressive demeanor and \"evil eyes\".  However, patient was able to identify that he at times can be loud and wondered if others have strong reactions when he is loud \"at times\".  Writer validated patient's observation while attempting to explore what he can do about it.  Patient minimized options suggesting he doesn't have any control over it because he can't call his mom and he began to cry as he talked about this.  Writer validated patient and observed the opportunity to practice using the skills and tools he was given previously and in the hospital.  Patient remained dismissive stating he doesn't have the cognitive capacity to do those things and began this rehearsed explanation about how crisis lines tell him to listen to music or watch TV but that he \"can't\" because he doesn't have them set up and it's \"not easy\" to plug them in.  Writer validated patient and attempted to redirect him to the skills he has been taught here including the grounding exercise practiced with writer.  Patient denied practicing it and that he hadn't even thought about it, despite later stating it was on the top of his pile of papers so he would practice it.  Patient continued to provide excuses (\"Barriers\") as to why he is unable to have a schedule for the day or attempt to use skills.  Patient complained that he was misdiagnosed with short term memory issues and was basically taught that he was stupid so is trying to undo all of that.  He also states his \"reassurance OCD\" interferes.  However, later he acknowledged he hasn't really any any of that while he has been in the hospital.  Patient continues to struggle with motivation.  Writer attempted to use motivational interviewing and explore what he thinks will help him get better.  Patient remains quite dependent on his mother as was evidenced by his dismissiveness of the weighted blanket, sound machine, and squeeze ball that were " "explicitly offered and addressed by OT and writer.  However, once his mother brought them up as good ideas he was fully on board and was only willing to try those skills.      Utilized motivational interviewing to help him reach his goal of having more independence including having guinea pigs.  Encouraged him to practice the grounding exercises each day even when he is not in a crisis mode to develop muscle memory.  Patient remains dismissive and resistant to a schedule initially stating he can't have one then later stating he already has a schedule, \"when the nurses bring me my meds, when my lunch arrives, when . . . .\" Writer encouraged and observed the benefit of adding a few things that he would be responsible for rather than waiting on others to bring or do things for him.  Will meet with him again early next week.  Likely Tuesday as writer will be helping with the Epic switch over on Monday.     Psychoterapeutic Techniques: Interpersonal Psychotherapy, Cognitive-behavioral therapy, motivational interviewing and supportive psychotherapy strategies were utilized.     Necessity:    The session was necessary for the care of the patient to address symptoms of severe depression, anxiety, and OCD.  Provider requested to have a daily schedule to be created for patient.     Progress:    Patient has shown minimal improvement in his ability to utilize coping skills to address symptoms of severe depression, anxiety, and OCD.  Provider requested to have a daily schedule to be created for patient.     Plan:    This writer will continue to meet with the patient on a regular basis while in the hospital to address mental health symptoms by continuing to utilize cognitive-behavioral and supportive psychotherapy.     Diagnosis:    Depression  OCD  Autism Spectrum Disorder  PTSD  Panic Disorder with Agoraphobia  Dependent Personality Traits       Provider:  Andra Ibarra PsyD, LP  Date: 7/8/2021           "

## 2021-07-09 NOTE — PROGRESS NOTES
Progress Notes by Coty Evans OT at 7/9/2021 10:49 AM     Author: Coty Evans OT Service: -- Author Type: Occupational Therapist    Filed: 7/9/2021 10:49 AM Date of Service: 7/9/2021 10:49 AM Status: Signed    : Coty Evans OT (Occupational Therapist)          07/09/21 1049   Engagement   Intervention Group   Topic Detail Gross motor movement and relaxation activity (chair yoga) for cardiovacular health, coping through relaxation, concentration, and healthy leisure.   Attendance Did not attend     Patient was heard crying loudly in his room after getting off the phone in the lounge.

## 2021-07-09 NOTE — PROGRESS NOTES
"Progress Notes by Coty Evans OT at 7/9/2021  3:27 PM     Author: Coty Evans OT Service: -- Author Type: Occupational Therapist    Filed: 7/9/2021  3:28 PM Date of Service: 7/9/2021  3:27 PM Status: Signed    : Coty Evans OT (Occupational Therapist)          07/09/21 1527   Engagement   Intervention Group   Topic Detail Coping skill exploration activity (essential oil lotion making) for strategies for coping with symptoms, wellness, grooming and hygiene, and opportunity to socialize.   Attendance Attended   Patient Response Demonstrated understanding of materials provided;Was respectful;Postive attitude   Concentrated on Task duration of group   Cognition Goal-directed;Takes initiative   Mood / Affect  Pleasant   Social/Behavioral  Cooperative;Engaged   Thought Content  Reality oriented     Patient identified \"grounding\" essential oil as a preferred healthy coping strategy - progressing towards care plan. Patient was very motivated to engage in group and shared where to purchase quality essential oil supplies and products. Patient demonstrated a bright affect in response to group engagement.        "

## 2021-07-09 NOTE — PROGRESS NOTES
Progress Notes by Lex Mckeon at 7/8/2021  1:00 PM     Author: Lex Mckeon Service: Psychology Author Type: Psychology Intern    Filed: 7/8/2021  3:05 PM Date of Service: 7/8/2021  1:00 PM Status: Attested    : Lex Mckeon (Psychology Intern) Cosigner: Andra Ibarra LP at 7/9/2021  9:23 AM    **Sensitive Note**    Attestation signed by Andra Ibarra LP at 7/9/2021  9:23 AM    I have read, discussed and agree with the documentation provided by  Mal Mckeon MA, Psychology Intern.     Andra Ibarra PsyD, LP                         7/08/21  1:00PM   Engagement   Intervention Group   Topic Emotion Regulation   Topic Detail Coping Skills   Attendance Declined to attend

## 2021-07-09 NOTE — PROGRESS NOTES
"Progress Notes by David Beard MD at 7/9/2021  1:10 PM     Author: David Beard MD Service: Psychiatry Author Type: Physician    Filed: 7/9/2021  4:37 PM Date of Service: 7/9/2021  1:10 PM Status: Signed    : David Beard MD (Physician)       PSYCHIATRY  PROGRESS NOTE     DATE OF SERVICE   7/9/2021         INFORMATION REGARDING HOSPITALIZATION    Per 7/2/2021 H&P from Dr. Roche  This is a 41 y.o. male with history of OCD (obsessive compulsive disorder), patient is single, with no children domiciled by him self in a town home but he has a care team from a private company, patient is on disability; that was admitted to the psychiatric inpatient unit due to severe depression and inability to take care of himself due to multiple recent medication changes.     Today patient was seen and evaluated in the consult room with nurse practitioner present during the assessment, this was a face-to-face evaluation.  Patient presented during the assessment extremely anxious, perseverating on his medications and I did again not able to provide accurate information.  His thought process is very concrete and he clearly has multiple obsessions.  Patient was perseverating on him not receiving his clomipramine and prazosin last night.  I informed the patient that prior to starting any medications we need to verify with the pharmacy and I always like to meet with the patient is to make sure that the medications the pharmacy reviewed are correct.  At the end patient verbalized good understanding and was in agreement with proceeding with the assessment.     Patient said that he had a huge fall because one of the medications that he is outpatient provider prescribed.  He was supposed to take this medication as needed for anxiety (Periactin).  Last Monday at 6 in the morning was when he had his fall and patient thinks that the new medication \"screwed up with the melatonin\". He was drowsy, foggy, got up and " "he fell down the stairs.  Patient tells me that he agreed to come to the hospital because he needs some to do a thorough look at he is medications.  Patient was extremely anxious at the moment of the assessment and he was unable to fully talk about his symptoms and how he has been feeling at home.  Patient did reported that he is nervous, anxious, overlwhelmed, scared because he wants things to get better.  Patient verbalized having problems with anxiety and depression. Ever since the accident last Monday this has been worst. Has memory problems, sensory problems, concentration is poor and he even said that he has problems understanding the suzanne here in the unit.  Patient was very upset about not taking the clomipramine this morning but at the same time patient tells me that he doesn't think the Clomipramine has been helpful, and the outpatient psychiatrist has been thinking about taking it away.  Patient said that over the last few days the only medication that was helpful was the lorazepam that he received in the emergency room.  He has developed bad side effects to the hydroxyzine and does not want to take this medication anymore.  At this time patient denies having any thoughts about harming himself or harming others and he has been able to contract for safety.  Denies having any hallucinations and no delusions have been elicited.      When talking about medications patient tells me that he has to stay away form all selective serotonin reuptake inhibitor because they cause more harm.  He also has to stay away form antipsychotics, they do nothing and \"screw him up\" (EPS?).  Patient was not able to give me a list of prior medications use.he  believed that he took Abilify and this medication was not beneficial..      I was able to communicate with patient's mother Cleo over the phone with patient present during the interview.  Mother was also very upset about the patient not being started on clomipramine last " night.  I have informed the mother the reasons why this was not done.  First I needed for the pharmacy to review the medication and I needed to review the medication myself with the patient.  Mother was also upset about the staff in the evening and she was constantly asking if we do have nurses working in the unit.  I did confirmed with the mother that we do have a nurse working with the patient on every shift.       Mother reported that the prazosin and the antyhistamine cause more problems and made the patient very anxious. Lorazepam has been the best medication for him, it calms him down, and he is able to function.  Lorazepam is not causing any side effects. The hesitaiton with his outpatient provider to give him this medication is because is related to valium and they do not want to put him on anything that is addictive.  Patient was very used to take Valium as was dependant on it. In 2019 the outpatient providers started to taper down the Valium. Valium was completely discontinued on May 23, 2021.  Mother thinks that after the discontinuation of the Valium the anxiety and the panic started coming forward.  In addition to all of this patient has been experiencing problems with staffing.  There have been a lot of changes for the patient at home.  He does not have the same staff working with him consistently, this has caused a lot of anxiety and has increased patient's obsessive thinking.  Mother reported that he currently the patient doesn't want to get out of bed, is having panick attacks and doesn't want to face the problems with the staff. He can't prepare food for him self, has not shower in a month and half and he is unable to leave his house.      At that moment I discussed with the patient and mother the tentative treatment plan.  First I have placed a consult for the hospitalist to come and see the patient as his blood pressure has been elevated.  Second I have sent a message to the psychologist in order  to work with the patient in creating a behavioral plan here in the unit..  I will communicate with the outpatient provider and coordinate cares with her.  In the meantime given that both patient and mother reported that the patient has done well on lorazepam and I will put him on lorazepam 0.5 mg 3 times a day.  Both mother and patient have verbalized good understanding and they are in agreement with this plan.     After I spoke with her outpatient providers I went back to the unit and met with the patient.  I reviewed with the patient the information that he was provided by the outpatient provider.  I discussed with the patient the use of Pristiq including reasons for prescribing this, benefits, risk and side effects.  After clarifying patient's questions he informed me is in agreement with a trial of Pristiq.     As per outpatient provider:   Patient has 2 different records in care everywhere and he has 2 different addresses.     Outpatient provider Rosanna Flores informed me that patient has been experiencing problems with the ability to cope with live, change and his anxiety. Valium was discontinue and the patient tolerated this well. More recently the problems is staffing. Staff has been quiting and he has been without staff.  Patient had an [facility] assessment that said that he needs 24/7 assistance.  This has led to more panic, patient making coments to wanting to kill him self because he wants to have people around him.  This cycle that is unbreakable at this moment.  The anxiety is not undercontrol.  Mark informed me that she wanted to take him off Clomipramine as this has not work to treat his anxiety, but the patient is not willing to change the medication at this time or increase the dose of clonazepam. Patient is convinced any other medication will cause problems.  In the past when the patient gets a new medication he asked for the staff that works with him to google the medication for side effects  "for the parents will do that for him.  He had [facility] assessment done years ago.  Outpatient provider thinks that the patient can benefit from a trial of Pristiq.  She will be faxing the [facility] assessment and her last note.     Later on I did receive both and indeed in the [facility] assessment it said that Pristiq is a medication that can be tried on the patient.  On her progress note said that the patient has tried Prozac, Lexapro, Celexa, Remeron, trazodone, temazepam, clomipramine, Abilify, Seroquel, Zyprexa, Depakote, diazepam, lorazepam, hydroxyzine, clonidine, guanfacine, propanolol, propanolol LA, melatonin, Benadryl and l-methylfolate.       CHIEF COMPLAINT   \"hello\"       SUBJECTIVE/OBJECTIVE   Upon patient interview, patient reports that he is still anxious in the am, much worse than later in the day.  He is overwhelmed with things he has to do during the day.  This includes basic ADL's.  He tried the low dose of baclofen that was initiated yesterday prn anxiety.  He thinks it may be helping a little.  He denies any AE from it. He denies SI/HI/AH/VH and can be obsessive but this seems less than earlier in the week.    Multidisciplinary team meeting was held today.  See comprehensive multidisciplinary treatment plan for full details.         Nursing: Patient denies pain. He slept for all of the night. Q 15 minutes safety checks is ongoing per unit policy. No behaviors noted. He sleeps more than 6 hrs of sleep. Pt is still sleeping.    :  Assessment/Intervention, Care Coordination and Current Symptoms:  Writer consulted with MD and care team and met with patient. Writer received call from patient's mother Cleo reporting she is working on scheduling a meeting between patient and his newly assigned home care agency, [facility], for possibly Tuesday of next week. Cleo also requests that patient create an updated \"crisis plan\" but that it focus on what is helpful for him. Writer " expressed that patient has been using a weighted blanket here and that this has been helpful. She reports they are working on getting him a weighted blanket for home.      Writer met with patient who reports he is feeling sad today due to another resident having discharged with whom he formed a bond. Patient appeared calm and expressed that he would be open to having a meeting with newly assigned home care staff. Patient signed a release for [facility](pt had previously been too overwhelmed to even discuss this). Writer explained to patient that his mother and Marine(MAYLIN WYNN) are working on coordinating this meeting and scheduling services so they will be ready for his return home.      Writer consulted with psychologist who met with patient and developed a coping plan.      Writer spoke with [facility] Jamie Rubio yesterday and provided update.      Discharge Plan or Goal:  Return home, in home services       Psychiatry:  He tried the low dose of baclofen that was initiated yesterday prn anxiety.  He thinks it may be helping a little.  He denies any AE from it. I will increase it to 2.5 mg three times a day prn anxiety.  He appears to be somewhat improving based on our conversations and observing him on the unit.   I informed him that his mother called and I spoke to her about this medication and how it is not habit forming and how we are using it for anxiety. ( in the future this maybe can be increased and take the place of the Ativan).  His mother also mentioned that it would be helpful to develop a behavioral plan for his anxiety and have it written down so he can take it with him after discharge as he is going to have all new providers helping him in his home and the transition to new people will be anxiety provoking.  We will continue the Pristiq at 50 mg and the Clomipramine at the current dosage.  It may be necessary to be on both medications.       CONSULTATIONS   Mike Anxiety / High Emotion  Plan  Signs that my emotions (or anxiety) are increasin.  Shaky/tremors  2.  Hot/sweaty  3.  Hands/feet tingly/prickly   4.  Hot/cold parts in body  5.  Headache   6.  Start to cry   7. Scream  8. Call out names of loved ones  9. Repeating self, talk in circles     Skills I can try to use when my emotions are increasing (*I may need to try and use multiple skills over and over)  1.  Weighted blanket  2.  Headphones   3. Sound machine  4. Puzzles  5. Play [place]   6. Try a skill from coping list à  7. Count forwards and backwards  8. Say ABCs  9. Breathing exercise  10.  Reach out and asked to talk to staff  11.  Take a PRN     Things staff can do to support me when my emotions are increasing  1.  Sit down and talk with patient  2. Encourage patient to use skills    Splash cold water on face, take a cold shower  Squeeze a rubber ball hard  5,4,3,2,1  5 things you can see around you (sometimes helps to spell the items out - forces you to slow down even more)  4 things you can touch  3 things you can hear  2 things you can smell  1 thing you can taste   Hold something and really focus on it  Distract yourself  Pick a color:  how many things in different shades of that color can you see around the room or out the window?  (still feeling stressed?  Pick another color)  Count backwards by 7 from 100  Slow deep breathing  Exhaling longer than you inhale forces the parasympathetic system to active - slowing down your heartrate, blood flow, and thoughts.  Mindfulness of body (focus on one body part to start if its too hard to do the whole body)  Feet: wiggle your toes in your shoe/sock. Are your feet warm or cold? Dry or sweaty? Notice them pushing into the ground.       3. Walk patient through skills if needed                  Andra Ibarra PsyD, EAMON    Date: 2021        Allergies     Patient has no known allergies.    MEDICATIONS     Scheduled Meds:  ? clomiPRAMINE  25 mg Oral DAILY   ? clomiPRAMINE  50 mg Oral  "QHS   ? desvenlafaxine succinate  50 mg Oral DAILY   ? LORazepam  1 mg Oral TID   ? prazosin  1 mg Oral QHS       PRN Meds:  acetaminophen, aluminum-magnesium hydroxide-simethicone, baclofen, hydrALAZINE, melatonin, polyethylene glycol    Medication adherence: Reviewed risk/benefits of medication , Patient able to verbalize understanding of side effects  and Patient verbally consents to taking medications  Medication side effects: none  Benefit from medications: minimal       ROS   Pertinent items are noted in HPI.         MENTAL STATUS EXAM   Vitals: /86 (Patient Position: Standing)   Pulse (!) 103   Temp 98.3  F (36.8  C) (Oral)   Resp 18   Ht 5' 9\" (1.753 m)   Wt 202 lb 9 oz (91.9 kg)   SpO2 97%   BMI 29.91 kg/m        Level of Consciousness:  alert and oriented  Appearance:  Disheveled, poor eye contact  Attitude:  cooperative and polite  Speech:  Within normal   Regular rate, rhythm, volume and tone  Language ability: intact  Mood:  \" maybe a little better\"  Affect: Calmer overall but still has emotional outbursts and crying-improving  Suicidal Ideation: absent  Homicidal Ideation: absent  Thought formation: concrete  Thought content: Obsession, very perseverative  Fundamentals of Knowledge: Sufficient  Attention/Concentration: okay  Memory: intact.   Insight and Judgement: impaired judgment and impaired insight  Orientation: person, place, time and situation  Psychomotor Behavior: WNL  Muscle Strength and Tone: normal  Gait and Station: intact       LABS   personally reviewed.   Recent Results (from the past 48 hour(s))   SARS-CoV-2 (COVID-19)-PCR    Specimen: Respiratory   Result Value Ref Range    SARS-CoV-2 PCR Result Negative Negative, Invalid     No results found for: PHENYTOIN, PHENOBARB, VALPROATE, CBMZ       IMAGING     No new Imaging       DIAGNOSIS   Principal Problem:    OCD (obsessive compulsive disorder)    Active Problem List:  Patient Active Problem List   Diagnosis   ? OCD " (obsessive compulsive disorder)   ? Autism spectrum disorder   ? Panic disorder with agoraphobia   ? PTSD (post-traumatic stress disorder)   ? Generalized anxiety disorder   ? Major depressive disorder, recurrent episode, moderate (H)   ? 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 male who is in the Autism spectrum which has exacerbated his OCD, anxiety and depression.  We spoke about taking things slowly and controlling what we can.      He tried the low dose of baclofen that was initiated yesterday prn anxiety.  He thinks it may be helping a little.  He denies any AE from it. I will increase it to 2.5 mg three times a day prn anxiety.  He appears to be somewhat improving based on our conversations and observing him on the unit.   I informed him that his mother called and I spoke to her about this medication and how it is not habit forming and how we are using it for anxiety. ( in the future this maybe can be increased and take the place of the Ativan).  His mother also mentioned that it would be helpful to develop a behavioral plan for his anxiety and have it written down so he can take it with him after discharge as he is going to have all new providers helping him in his home and the transition to new people will be anxiety provoking.  We will continue the Pristiq at 50 mg and the Clomipramine at the current dosage.  It may be necessary to be on both medications.     HOSPITALIZATION SUMMARY   Daily Assessment:  7/5/2021: Change prazosin to 1 mg p.o. nightly D/C a.m. dose  7/6/2021:  Increase Lorazepam to 1 mg po three times a day; weighted blanket;  Increase Pristiq to 50 mg tomorrow AM  7/7/2021:  Increase Pristiq to 50 mg   7/8/2021:  Start baclofen 2.5 mg po two times a day, as needed, for anxiety  7/9/2021:  Increase baclofen to 2.5 mg three times a day prn anxiety; Behavioral plan developed- See consult section.         PLAN   1. Ongoing education given regarding  diagnostic and treatment options with risks, benefits and alternatives and adequate verbalization of understanding.  2. Continue medication management given beneficial response.   3.  to follow in regards to collecting and reviewing collateral information, referrals and disposition planning.    4.  Psychiatric Plan Of Care:        Medication changes:       Increase baclofen 2.5 mg po to 3 times a day, as needed, for anxiety  5.  Substance Use Plan Of Care:        N/A    6.  Medical Plan Of Care:       N/A  7.  Legal Plan of Care       Legal Status: voluntary           8.  Risk Assessment & Milieu Plan of Care:       Risk Assessment: Patient on precautions       Encourage the patient to participate in unit activities.       Further treatment programming to be determined throughout the hospital course.    Coordination of Care:   Treatment Plan reviewed and physician signed, Care discussed with Care/Treatment Team Members, Chart reviewed, Patient seen and Care reviewed with family/caretakers    Re-Certification I certify that the inpatient psychiatric facility services furnished since the previous certification were, and continue to be, medically necessary for, either, treatment which could reasonably be expected to improve the patients condition or diagnostic study and that the hospital records indicate that the services furnished were, either, intensive treatment services, admission and related services necessary for diagnostic study, or equivalent services.     I certify that the patient continues to need, on a daily basis, active treatment furnished directly by or requiring the supervision of inpatient psychiatric facility personnel.   I estimate 3-7 more days of hospitalization is necessary for proper treatment of the patient. My plans for post-hospital care for this patient are [facility] Care List: Home with follow-up     David Beard MD     -     7/9/2021     -     1:10  PM          Start time:  1:10 PM  Stop time:  1:35 PM    Total time 35 minutes with > 50% spent on coordination of cares and psycho-education.    This note was created with help of Dragon dictation system. Grammatical / typing errors are not intentional.    David Beard MD

## 2021-07-09 NOTE — PROGRESS NOTES
Progress Notes by Yolie Toscano SW at 7/9/2021  1:53 PM     Author: Yolie Toscano SW Service: -- Author Type:     Filed: 7/9/2021  1:54 PM Date of Service: 7/9/2021  1:53 PM Status: Signed    : Yolie Toscano SW ()          07/09/21 1353   Engagement   Intervention Group   Topic Insight development/self-awareness;Recovery planning   Topic Detail Self-Care   Attendance Did not attend

## 2021-07-09 NOTE — PLAN OF CARE
"Plan of Care by Jovanna Day SW at 7/9/2021  2:47 PM     Author: Jovanna Day SW Service: -- Author Type:     Filed: 7/9/2021  2:56 PM Date of Service: 7/9/2021  2:47 PM Status: Signed    : Jovanna Day SW ()       Assessment/Intervention, Care Coordination and Current Symptoms:  Writer consulted with MD and care team and met with patient. Writer received call from patient's mother Cleo reporting she is working on scheduling a meeting between patient and his newly assigned home care agency, [facility], for possibly Tuesday of next week. Cleo also requests that patient create an updated \"crisis plan\" but that it focus on what is helpful for him. Writer expressed that patient has been using a weighted blanket here and that this has been helpful. She reports they are working on getting him a weighted blanket for home.     Writer met with patient who reports he is feeling sad today due to another resident having discharged with whom he formed a bond. Patient appeared calm and expressed that he would be open to having a meeting with newly assigned home care staff. Patient signed a release for [facility](pt had previously been too overwhelmed to even discuss this). Writer explained to patient that his mother and Marine(MAYLIN WYNN) are working on coordinating this meeting and scheduling services so they will be ready for his return home.     Writer consulted with psychologist who met with patient and developed a coping plan.     Writer spoke with GOOD Rubio yesterday and provided update.     Discharge Plan or Goal:  Return home, in home services      Barriers to Discharge:  Sx stabilization, medication management, discharge planning      Referral Status:  MAYLIN Hawthorne has made referral to [facility] for in home services. Patient's mother reports she is looking into new outpatient psychiatry provider.       Legal Status:  Vol    Important contacts:  Jamie" Joanne: [facility] with [place]: [phone]  CADI  Marine with [place]:  [phone]  [facility] Cleo: [phone]    Jovanna Day [facility], 7/9/2021, 10:19 AM

## 2021-07-09 NOTE — PROGRESS NOTES
Progress Notes by Andra Ibarra LP at 2021  1:33 PM     Author: Andra Ibarra LP Service: -- Author Type: Psychologist    Filed: 2021  1:34 PM Date of Service: 2021  1:33 PM Status: Signed    : Andra Ibarra LP (Psychologist)     Summary: High Emotions Skills Plan      [facility] Anxiety / High Emotion Plan  Signs that my emotions (or anxiety) are increasin.  Shaky/tremors  2.  Hot/sweaty  3.  Hands/feet tingly/prickly   4.  Hot/cold parts in body  5.  Headache   6.  Start to cry   7. Scream  8. Call out names of loved ones  9. Repeating self, talk in circles    Skills I can try to use when my emotions are increasing (*I may need to try and use multiple skills over and over)  1.  Weighted blanket  2.  Headphones   3. Sound machine  4. Puzzles  5. Play [place]   6. Try a skill from coping list à  7. Count forwards and backwards  8. Say ABCs  9. Breathing exercise  10.  Reach out and asked to talk to staff  11.  Take a PRN    Things staff can do to support me when my emotions are increasing  1.  Sit down and talk with patient  2. Encourage patient to use skills    Splash cold water on face, take a cold shower  Squeeze a rubber ball hard  5,4,3,2,1  5 things you can see around you (sometimes helps to spell the items out - forces you to slow down even more)  4 things you can touch  3 things you can hear  2 things you can smell  1 thing you can taste   Hold something and really focus on it  Distract yourself  Pick a color:  how many things in different shades of that color can you see around the room or out the window?  (still feeling stressed?  Pick another color)  Count backwards by 7 from 100  Slow deep breathing  Exhaling longer than you inhale forces the parasympathetic system to active - slowing down your heartrate, blood flow, and thoughts.  Mindfulness of body (focus on one body part to start if its too hard to do the whole body)  Feet: wiggle your toes in your shoe/sock.  Are your feet warm or cold? Dry or sweaty? Notice them pushing into the ground.      3. Walk patient through skills if needed

## 2021-07-10 PROBLEM — F39 UNSPECIFIED MOOD (AFFECTIVE) DISORDER (H): Status: ACTIVE | Noted: 2021-07-10

## 2021-07-10 PROCEDURE — 999N001212 HC REV CODE 9999 CHARGE CONVERSION OPNP

## 2021-07-10 RX ORDER — CYPROHEPTADINE HYDROCHLORIDE 4 MG/1
4 TABLET ORAL EVERY 4 HOURS PRN
Status: ON HOLD | COMMUNITY
End: 2021-07-13

## 2021-07-10 RX ORDER — CLOMIPRAMINE HYDROCHLORIDE 25 MG/1
25 CAPSULE ORAL DAILY
Status: DISCONTINUED | OUTPATIENT
Start: 2021-07-11 | End: 2021-07-15

## 2021-07-10 RX ORDER — BACLOFEN 10 MG/1
2.5 TABLET ORAL 3 TIMES DAILY PRN
Status: DISCONTINUED | OUTPATIENT
Start: 2021-07-11 | End: 2021-07-11 | Stop reason: CLARIF

## 2021-07-10 RX ORDER — POLYETHYLENE GLYCOL 3350 17 G/17G
17 POWDER, FOR SOLUTION ORAL DAILY PRN
Status: DISCONTINUED | OUTPATIENT
Start: 2021-07-11 | End: 2021-07-20 | Stop reason: HOSPADM

## 2021-07-10 RX ORDER — LORAZEPAM 0.5 MG/1
1 TABLET ORAL 3 TIMES DAILY
Status: DISCONTINUED | OUTPATIENT
Start: 2021-07-11 | End: 2021-07-20 | Stop reason: HOSPADM

## 2021-07-10 RX ORDER — PRAZOSIN HYDROCHLORIDE 1 MG/1
1 CAPSULE ORAL 2 TIMES DAILY
Status: ON HOLD | COMMUNITY
End: 2021-07-13

## 2021-07-10 RX ORDER — ACETAMINOPHEN 325 MG/1
650 TABLET ORAL EVERY 4 HOURS PRN
Status: DISCONTINUED | OUTPATIENT
Start: 2021-07-11 | End: 2021-07-20 | Stop reason: HOSPADM

## 2021-07-10 RX ORDER — PRAZOSIN HYDROCHLORIDE 1 MG/1
1 CAPSULE ORAL AT BEDTIME
Status: DISCONTINUED | OUTPATIENT
Start: 2021-07-11 | End: 2021-07-20 | Stop reason: HOSPADM

## 2021-07-10 RX ORDER — DESVENLAFAXINE 50 MG/1
50 TABLET, FILM COATED, EXTENDED RELEASE ORAL DAILY
Status: DISCONTINUED | OUTPATIENT
Start: 2021-07-11 | End: 2021-07-20 | Stop reason: HOSPADM

## 2021-07-10 RX ORDER — CLOMIPHENE CITRATE 50 MG/1
50 TABLET ORAL 2 TIMES DAILY
Status: ON HOLD | COMMUNITY
End: 2021-07-11

## 2021-07-10 RX ORDER — CLOMIPHENE CITRATE 50 MG/1
50 TABLET ORAL AT BEDTIME
Status: DISCONTINUED | OUTPATIENT
Start: 2021-07-11 | End: 2021-07-11

## 2021-07-10 RX ORDER — HYDRALAZINE HYDROCHLORIDE 10 MG/1
10 TABLET, FILM COATED ORAL EVERY 8 HOURS PRN
Status: DISCONTINUED | OUTPATIENT
Start: 2021-07-11 | End: 2021-07-20 | Stop reason: HOSPADM

## 2021-07-10 RX ORDER — PHENOL 1.4 %
10 AEROSOL, SPRAY (ML) MUCOUS MEMBRANE
Status: ON HOLD | COMMUNITY
End: 2021-07-20

## 2021-07-10 RX ORDER — MAGNESIUM HYDROXIDE/ALUMINUM HYDROXICE/SIMETHICONE 120; 1200; 1200 MG/30ML; MG/30ML; MG/30ML
30 SUSPENSION ORAL EVERY 4 HOURS PRN
Status: DISCONTINUED | OUTPATIENT
Start: 2021-07-11 | End: 2021-07-20 | Stop reason: HOSPADM

## 2021-07-10 ASSESSMENT — MIFFLIN-ST. JEOR
SCORE: 1806.38
SCORE: 1806.64

## 2021-07-10 NOTE — PROGRESS NOTES
Progress Notes by Coty Evans OT at 7/10/2021 11:48 AM     Author: Coty Evans OT Service: -- Author Type: Occupational Therapist    Filed: 7/10/2021 11:48 AM Date of Service: 7/10/2021 11:48 AM Status: Signed    : Coty Evans OT (Occupational Therapist)          07/10/21 1148   Engagement   Intervention Group   Topic Detail Creative endeavor (sand art) for creative expression, coping with symptoms through distraction technique, sensory input, attention to detail, planning and organizing, and opportunity to socialize.   Attendance Did not attend   Reason for Not Attending Refused

## 2021-07-10 NOTE — PLAN OF CARE
"Plan of Care by Ayla Evangelista RN at 7/10/2021 12:30 PM     Author: Ayla Evangelista RN Service: -- Author Type: Registered Nurse    Filed: 7/10/2021 12:38 PM Date of Service: 7/10/2021 12:30 PM Status: Signed    : Ayla Evangelista RN (Registered Nurse)         Problem: Psychosocial Needs  Goal: Demonstrates ability to cope with hospitalization/illness  Outcome: Progressing  Pt. Tearful in his room, crying out \"I miss you mom where are you mom I need you\". PRN baclofen, Tylenol and therapeutic communication  effective, refused other therapeutic interventions.  Refused group, out for meals only. Anxiety 4, depression 3, denies pain SI/HI and hallucinations, contracts for safety.            "

## 2021-07-10 NOTE — PROGRESS NOTES
Progress Notes by Andra Ibarra LP at 2021 12:00 PM     Author: Andra Ibarra LP Service: -- Author Type: Psychologist    Filed: 7/10/2021 11:01 AM Date of Service: 2021 12:00 PM Status: Signed    : Andra Ibarra LP (Psychologist)       Psychology Psychotherapy  Note       Name:  Glenroy Adler  :  1980  MRN:  [id number]      Date: 2021  Duration:  38 minutes (12:00-12:13; 1:05-1:30pm)     Target Symptoms:    The patient was seen to create a high anxiety/high emotions coping plan.        Participation:  The patient was able to participate and benefit from treatment as evidenced by his verbal expression of ideas and initiation of topics discussed.     Mental Status:    Mood:  anxious  Affect:  anxious, guarded, mildly defensive  Motor activity: Within normal limits-laying on bed staring at ceiling or sitting at table  Suicidal Ideation:  absent  Homicidal Ideation:  absent  Thought process:   [place], perseverates and obsesses on specific issues  Thought content: obsessional, perseverates  Attention/Concentration:  Within normal limits; Within normal limits  Language/Speech ability:  WNL  Memory:  No evidence of impairment.  Insight and Judgement:  limited  Orientation:  person, place, time and situation  Appearance: casually-dressed,  and dismissive,  Attire:  Appropriate  Grooming: Well-groomed  Age: appears stated age  Eye Contact:  Avoidant-stares at ceiling as he is laying on his bed or looking at his tray  Estimated IQ:  average       Intervention:    Writer initially approached patient at 12:00pm after consulting with psychiatrist.  His mother reported a desire to have his 'crisis plan' updated to help him address and deal with his anxiety and other high intensity emotions.  Patient was sitting in the milieu with his lunch tray and had just finished talking to a nurse who informed him his corrected lunch would be brought up to him shortly.  Patient was  "dismissive but somewhat pleasant.  Writer introduced the idea of a high emotion/anxiety plan and patient stated he already has a crisis plan at home and he is \"not allowed to touch it\" without his treatment team.  Patient at times would say the plan doesn't work and other times would say it does work \"if staff would actually read it\".  He would preservative on how they have had to write it out in excessive detail to help some of the staff while others complain it is too detailed.  Writer validated patient's ambivalence and explored the idea of creating a plan even just for while he is in the hospital.  Patient was somewhat ambivalent but agreeable to this.  Writer attempted to talk to him about what his signals are for when he is feeling high emotions or anxiety.  Patient was dismissive stating he can't talk about those kinds of things when he is trying to eat. Writer stated she would try to come back later.    Writer re-apparoached patient around 1:05pm after he finished talked to his .  He was laying down in his bed with the weighted blanket on him.  Writer reminded him of conversation earlier and patient appeared confused as he had just talked about the plan a little bit with his  and again explained that he can't change anything on the plan except the title.  He suggested that it could be faxed over to the hospital but even then he wasn't sure what/how it could be changed.  Writer re-introduced the idea of creating a plan just for while he is in the hospital and shift away from the crisis plan that he has in the community.  With frequent redirection, writer was able to ascertain symptoms/signs that patient is starting experience high emotion/anxiety.  Next focused on skills (he verbalized) that he could try to use to help him in the moment.  Patient acknowledged he hasn't tried the sound machine but could and the list of skills that were provided from writer (also not tried any).  " "Lastly, explored briefly anything the staff can do to be supportive of patient.  Patient suggested talking with the patient, helping him through a skill, and reaching out to talk to him more.  Writer validated this while also encouraged patient to reach out and ask to chat with staff as needed and the importance of taking responsibility for his emotional needs as staff and others cannot read his mind.  Patient was moderately receptive to this.  Patient commented that the mornings are the hardest for him.  Validated this and attempted to cope ahead and problem solve by encouraging him to consider what skills he can plan on using in the morning anticipating it will be more challenging. \"I don't know, I can't use any I'm already too anxious. . .\".  Patient was resistant to try to think of any skills that he could possibly plan on using proving multiple barriers and excuses for why he cannot try to use them.      Plan is in a different note in chart, hard copy in chart, and left with .  Will meet with patient again on Tuesday.       Psychoterapeutic Techniques: Interpersonal Psychotherapy, Cognitive-behavioral therapy, motivational interviewing and supportive psychotherapy strategies were utilized.     Necessity:    The session was necessary for creating a high emotions/anxiety plan     Progress:    Patient has shown mild improvement in his ability to utilize coping skills to address symptoms of high anxiety and emotions.  Patient lacks motivation, willingness, and continues to exhibit dependency on others to make him feel better.     Plan:    This writer will continue to meet with the patient on a regular basis while in the hospital to address mental health symptoms by continuing to utilize cognitive-behavioral and supportive psychotherapy.     Diagnosis:    Depression  OCD  Autism Spectrum Disorder  PTSD  Panic Disorder with Agoraphobia  Dependent Personality Traits        Provider:  Andra Ibarra, " EAMON Goncalves  Date: 7/9/2021

## 2021-07-10 NOTE — PROGRESS NOTES
Progress Notes by Davilmar, Claude, RN at 7/10/2021  6:24 AM     Author: Davilmar, Claude, RN Service: -- Author Type: Registered Nurse    Filed: 7/10/2021  6:24 AM Date of Service: 7/10/2021  6:24 AM Status: Signed    : Davilmar, Claude, RN (Registered Nurse)        Patient has 2.5 mg baclofen for anxiety 5/10. He denies pain. He slept for all of the night. Q 15 minutes safety checks is ongoing per unit policy. No behaviors noted. He sleeps more than 6 hrs of sleep. Pt is still continuing to sleep.  Claude Davilmar, RN

## 2021-07-11 PROBLEM — F41.1 GENERALIZED ANXIETY DISORDER: Status: ACTIVE | Noted: 2021-07-06

## 2021-07-11 PROBLEM — F33.1 MAJOR DEPRESSIVE DISORDER, RECURRENT EPISODE, MODERATE (H): Status: ACTIVE | Noted: 2021-07-06

## 2021-07-11 PROBLEM — F60.7: Status: ACTIVE | Noted: 2021-07-06

## 2021-07-11 PROCEDURE — 250N000013 HC RX MED GY IP 250 OP 250 PS 637

## 2021-07-11 PROCEDURE — 250N000013 HC RX MED GY IP 250 OP 250 PS 637: Performed by: PSYCHIATRY & NEUROLOGY

## 2021-07-11 PROCEDURE — 128N000001 HC R&B CD/MH ADULT

## 2021-07-11 RX ORDER — CLOMIPRAMINE HYDROCHLORIDE 50 MG/1
50 CAPSULE ORAL 2 TIMES DAILY
Status: ON HOLD | COMMUNITY
End: 2021-07-13

## 2021-07-11 RX ORDER — DIPHENHYDRAMINE HYDROCHLORIDE 50 MG/ML
50 INJECTION INTRAMUSCULAR; INTRAVENOUS EVERY 8 HOURS PRN
Status: CANCELLED | OUTPATIENT
Start: 2021-07-11

## 2021-07-11 RX ORDER — LORAZEPAM 2 MG/ML
2 INJECTION INTRAMUSCULAR EVERY 8 HOURS PRN
Status: CANCELLED | OUTPATIENT
Start: 2021-07-11

## 2021-07-11 RX ORDER — CLOMIPRAMINE HYDROCHLORIDE 50 MG/1
50 CAPSULE ORAL AT BEDTIME
Status: DISCONTINUED | OUTPATIENT
Start: 2021-07-11 | End: 2021-07-20 | Stop reason: HOSPADM

## 2021-07-11 RX ORDER — HALOPERIDOL 5 MG/ML
5 INJECTION INTRAMUSCULAR EVERY 8 HOURS PRN
Status: CANCELLED | OUTPATIENT
Start: 2021-07-11

## 2021-07-11 RX ORDER — HALOPERIDOL 5 MG/1
5 TABLET ORAL EVERY 8 HOURS PRN
Status: CANCELLED | OUTPATIENT
Start: 2021-07-11

## 2021-07-11 RX ORDER — HYDROXYZINE HYDROCHLORIDE 25 MG/1
25-50 TABLET, FILM COATED ORAL 3 TIMES DAILY PRN
Status: DISCONTINUED | OUTPATIENT
Start: 2021-07-11 | End: 2021-07-12

## 2021-07-11 RX ORDER — LORAZEPAM 2 MG/1
2 TABLET ORAL EVERY 8 HOURS PRN
Status: CANCELLED | OUTPATIENT
Start: 2021-07-11

## 2021-07-11 RX ORDER — DIPHENHYDRAMINE HCL 50 MG
50 CAPSULE ORAL EVERY 8 HOURS PRN
Status: CANCELLED | OUTPATIENT
Start: 2021-07-11

## 2021-07-11 RX ORDER — BACLOFEN 10 MG/1
2.5 TABLET ORAL 3 TIMES DAILY PRN
Status: DISCONTINUED | OUTPATIENT
Start: 2021-07-11 | End: 2021-07-20 | Stop reason: HOSPADM

## 2021-07-11 RX ORDER — HYDROXYZINE HYDROCHLORIDE 25 MG/1
25 TABLET, FILM COATED ORAL 3 TIMES DAILY PRN
Status: DISCONTINUED | OUTPATIENT
Start: 2021-07-11 | End: 2021-07-11

## 2021-07-11 RX ADMIN — Medication 10 MG: at 20:30

## 2021-07-11 RX ADMIN — DESVENLAFAXINE 50 MG: 50 TABLET, FILM COATED, EXTENDED RELEASE ORAL at 08:25

## 2021-07-11 RX ADMIN — LORAZEPAM 1 MG: 0.5 TABLET ORAL at 20:30

## 2021-07-11 RX ADMIN — Medication 2.5 MG: at 08:57

## 2021-07-11 RX ADMIN — Medication 2.5 MG: at 17:57

## 2021-07-11 RX ADMIN — Medication 2.5 MG: at 02:45

## 2021-07-11 RX ADMIN — LORAZEPAM 1 MG: 0.5 TABLET ORAL at 13:06

## 2021-07-11 RX ADMIN — ACETAMINOPHEN 650 MG: 325 TABLET ORAL at 18:31

## 2021-07-11 RX ADMIN — CLOMIPRAMINE HCL 50 MG: 50 CAPSULE ORAL at 21:47

## 2021-07-11 RX ADMIN — LORAZEPAM 1 MG: 0.5 TABLET ORAL at 08:25

## 2021-07-11 RX ADMIN — CLOMIPRAMINE HYDROCHLORIDE 25 MG: 25 CAPSULE ORAL at 08:25

## 2021-07-11 RX ADMIN — PRAZOSIN HYDROCHLORIDE 1 MG: 1 CAPSULE ORAL at 20:30

## 2021-07-11 ASSESSMENT — ACTIVITIES OF DAILY LIVING (ADL)
HYGIENE/GROOMING: INDEPENDENT
DRESS: INDEPENDENT
LAUNDRY: UNABLE TO COMPLETE
HYGIENE/GROOMING: INDEPENDENT
LAUNDRY: UNABLE TO COMPLETE
DRESS: INDEPENDENT
ORAL_HYGIENE: INDEPENDENT
ORAL_HYGIENE: INDEPENDENT

## 2021-07-11 NOTE — PROGRESS NOTES
Pt slept for 5 to 6 hours, c/o restlessness, PRN Baclofen effective. Safety checks maintained per policy, remains safe. No other problems noted/reported.

## 2021-07-11 NOTE — PLAN OF CARE
Plan of Care by Tata Leigh RN at 7/10/2021 10:17 PM     Author: Tata Leigh RN Service: -- Author Type: Registered Nurse    Filed: 7/10/2021 10:17 PM Date of Service: 7/10/2021 10:17 PM Status: Signed    : Tata Leigh RN (Registered Nurse)         Problem: Pain  Goal: Patient's pain/discomfort is manageable  Outcome: Progressing     Problem: Safety  Goal: Patient will be injury free during hospitalization  Outcome: Progressing     Problem: Daily Care  Goal: Daily care needs are met  Outcome: Progressing     Problem: Knowledge Deficit  Goal: Patient/family/caregiver demonstrates understanding of disease process, treatment plan, medications, and discharge instructions  Outcome: Progressing     Problem: Improve ability to cope with stress and symptoms  Goal: STG: Identify healthy coping skills to manage stress and reduce symptoms  Description: Target: Id 6 healthy coping skills to manage stress and anxiety and reduce symptoms  Outcome: Progressing    Pt stayed in room most time, out for meals  and snacks only with limited interaction wit others. Endorses and rated anxiety 4, depression 6, denies pain SI/HI and hallucinations, contracts for safety. Declined tx for anxiety. He was med compliant. safety checks in place

## 2021-07-12 LAB — SARS-COV-2 RNA RESP QL NAA+PROBE: NEGATIVE

## 2021-07-12 PROCEDURE — 87635 SARS-COV-2 COVID-19 AMP PRB: CPT | Performed by: PSYCHIATRY & NEUROLOGY

## 2021-07-12 PROCEDURE — 99231 SBSQ HOSP IP/OBS SF/LOW 25: CPT | Performed by: PSYCHIATRY & NEUROLOGY

## 2021-07-12 PROCEDURE — 128N000001 HC R&B CD/MH ADULT

## 2021-07-12 PROCEDURE — 250N000013 HC RX MED GY IP 250 OP 250 PS 637

## 2021-07-12 PROCEDURE — 250N000013 HC RX MED GY IP 250 OP 250 PS 637: Performed by: PSYCHIATRY & NEUROLOGY

## 2021-07-12 RX ADMIN — LORAZEPAM 1 MG: 0.5 TABLET ORAL at 20:08

## 2021-07-12 RX ADMIN — DESVENLAFAXINE 50 MG: 50 TABLET, FILM COATED, EXTENDED RELEASE ORAL at 08:34

## 2021-07-12 RX ADMIN — Medication 2.5 MG: at 17:59

## 2021-07-12 RX ADMIN — LORAZEPAM 1 MG: 0.5 TABLET ORAL at 08:34

## 2021-07-12 RX ADMIN — CLOMIPRAMINE HCL 50 MG: 50 CAPSULE ORAL at 20:08

## 2021-07-12 RX ADMIN — ACETAMINOPHEN 650 MG: 325 TABLET ORAL at 21:46

## 2021-07-12 RX ADMIN — LORAZEPAM 1 MG: 0.5 TABLET ORAL at 15:32

## 2021-07-12 RX ADMIN — Medication 15 MG: at 20:09

## 2021-07-12 RX ADMIN — Medication 2.5 MG: at 07:24

## 2021-07-12 RX ADMIN — ACETAMINOPHEN 650 MG: 325 TABLET ORAL at 05:53

## 2021-07-12 RX ADMIN — PRAZOSIN HYDROCHLORIDE 1 MG: 1 CAPSULE ORAL at 20:09

## 2021-07-12 RX ADMIN — CLOMIPRAMINE HYDROCHLORIDE 25 MG: 25 CAPSULE ORAL at 08:38

## 2021-07-12 ASSESSMENT — ACTIVITIES OF DAILY LIVING (ADL)
DRESS: INDEPENDENT
HYGIENE/GROOMING: INDEPENDENT
ORAL_HYGIENE: INDEPENDENT

## 2021-07-12 NOTE — PROGRESS NOTES
GROUP DOCUMENTATION    GROUP THERAPY DATE AND START TIME:   07/12/21 1300   GROUP LENGTH (MINS):   50   RELEVANT PRIMARY DIAGNOSIS: Unspecified mood disorder   GROUP TYPE:                 [x]  Coping Skills               [x]Insight development                   []  Recovery planning      []Symptom management       []  Social/communication skills        []  Wellness strategies  []Other:     TREATMENT MODALITY:      []CBT       []DBT       [x]ACT         [x]Interpersonal psychotherapy           []Psychoeducational therapy         []Skill development         []Other:        TOPIC DETAIL:   Self Exploration   ATTENDANCE:        []Stayed for entire group     []Arrived late    [] Left early       Total minutes:   0 Did not attend   PATIENT PROGRESS:     []Demonstrated understanding           []Participated       []Asked questions                               []Contributed        []Expressed goals                               []Engaged         []Argumentative                                  []Withdrawn                     []Comprehension difficulty         []Other:     COGNITION:        []Confused     []Distracted     []Alert and Orientated       []Goal Directed        []Other:     MOOD/AFFECT ASSESSMENT:        []Angry                   []Anxious                     []Blunted       []Elevated              []Congruent                 []Irritable            []Bored                  [] Happy                       []Depressed       []Withdrawn           []Content      []Other:   THOUGHT CONTENT:     []Within Normal Limits     []Delusional     []Grandiose       []Paranoid                        []Disorganized        []Negative Symptoms      []Perseverative        []Other:   # OF PATIENTS IN GROUP: 4   BILLABLE:     []Yes            [x]No   Notes:

## 2021-07-12 NOTE — PROGRESS NOTES
07/12/21 1303   Engagement   Intervention Group   Topic Detail OT Wellness group-Exercise dice for social engagement, following directions, physical movement, symptom managment and healthy distraction   Attendance Did not attend   Reason for Not Attending Refused

## 2021-07-12 NOTE — PLAN OF CARE
Assessment/Intervention/Current Symtoms and Care Coordination    Writer met with patient to discuss discharge plans. Patient stated that he had a difficult weekend and that he and his mother had struggled to communicate. Patient expressed that he is willing to work with provider and SW despite feeling hopeless and helpless. Patient was able to verbalize his feeling however thoughts could be circular at times.    Wrier spoke to mother two times during the day. Mother stated that patient had struggled with the menu change, staff change and not being krzysztof to finish hs project as quick as others. Mother stated that she was hoping that connections to additional support can be established prior to discharge from hospital. Mom is aware that patient needs to agree to supports. Mother requested SW have daily contact.    Writer spoke to Nicholas from [facility].KINGSLEY Hawthorne and Hattie would like to do an in person intake with patient on Thursday  @ 11 am. Writer stated that she will review visiting policies and provide update       Discharge Plan or Goal: Rturn to home, in home services      Barriers to Discharge         Referral Status pending    [facility]   [facility]   [facility]  Marine with [place]:  [phone]    Legal Status Vol.

## 2021-07-12 NOTE — PLAN OF CARE
Problem: Behavioral Health Plan of Care  Goal: Adheres to Safety Considerations for Self and Others  Outcome: Improving     Patient appeared sleeping for most part of the night. Safety precautions in place per unit protocol. No crying episodes during this shift. He had greater than 6 hrs of sleep. Tylenol given for HA 4/10 at 0600 hrs
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
## 2021-07-12 NOTE — PLAN OF CARE
"  Problem: Behavioral Health Plan of Care  Goal: Adheres to Safety Considerations for Self and Others  Outcome: No Change     Problem: Behavioral Health Plan of Care  Goal: Optimized Coping Skills in Response to Life Stressors  Outcome: No Change     Problem: Suicidal Behavior  Goal: Suicidal Behavior is Absent or Managed  Outcome: No Change     Pt was initially very agitated and crying out the onset of the shift. He was screaming \"mom\", and repeating \"I want to go to bed.\" Rambling, tangential speech. Pt had placed a call to his mother and he shared that he has been having conflict with his mother. He was directed to his room and given support by staff. His outbursts were very disruptive to the unit during breakfast time.   Pts mother called the unit to express that she is discouraged about his behavior, and feels he regressed over the weekend. Cleo (mother) stated, I just want it on the record that I am concerned about the way he views life and blames others for everything.\"    Pt rated anxiety 10/10, depression 10/10 this am. He denies SI/HI/SIB.  Pt received his scheduled morning medications. He asked for \"medication to put me to sleep.\" Sleep aids not given during day hours. He had already received prn Baclofen recently so no other prns for anxiety/agitation available. Pt was able to de escalate himself eventually. He has declined groups so far this shift.     "

## 2021-07-12 NOTE — PROGRESS NOTES
07/12/21 1539   Engagement   Intervention Group   Topic Detail OT Creative Expressions group-watercolor painting for relaxation, social engagement, creativity, symptom management and healthy leisure   Attendance Did not attend   Reason for Not Attending Refused

## 2021-07-12 NOTE — PROGRESS NOTES
"   PSYCHIATRY  PROGRESS NOTE     DATE OF SERVICE   July 12, 2021     Progress notes have been reviewed     INFORMATION REGARDING HOSPITALIZATION    Per 7/2/2021 H&P from Dr. Roche  This is a 41 y.o. male with history of OCD (obsessive compulsive disorder), patient is single, with no children domiciled by him self in a town home but he has a care team from a private company, patient is on disability; that was admitted to the psychiatric inpatient unit due to severe depression and inability to take care of himself due to multiple recent medication changes.     Today patient was seen and evaluated in the consult room with nurse practitioner present during the assessment, this was a face-to-face evaluation.  Patient presented during the assessment extremely anxious, perseverating on his medications and I did again not able to provide accurate information.  His thought process is very concrete and he clearly has multiple obsessions.  Patient was perseverating on him not receiving his clomipramine and prazosin last night.  I informed the patient that prior to starting any medications we need to verify with the pharmacy and I always like to meet with the patient is to make sure that the medications the pharmacy reviewed are correct.  At the end patient verbalized good understanding and was in agreement with proceeding with the assessment.     Patient said that he had a huge fall because one of the medications that he is outpatient provider prescribed.  He was supposed to take this medication as needed for anxiety (Periactin).  Last Monday at 6 in the morning was when he had his fall and patient thinks that the new medication \"screwed up with the melatonin\". He was drowsy, foggy, got up and he fell down the stairs.  Patient tells me that he agreed to come to the hospital because he needs some to do a thorough look at he is medications.  Patient was extremely anxious at the moment of the assessment and he was unable to " "fully talk about his symptoms and how he has been feeling at home.  Patient did reported that he is nervous, anxious, overlwhelmed, scared because he wants things to get better.  Patient verbalized having problems with anxiety and depression. Ever since the accident last Monday this has been worst. Has memory problems, sensory problems, concentration is poor and he even said that he has problems understanding the suzanne here in the unit.  Patient was very upset about not taking the clomipramine this morning but at the same time patient tells me that he doesn't think the Clomipramine has been helpful, and the outpatient psychiatrist has been thinking about taking it away.  Patient said that over the last few days the only medication that was helpful was the lorazepam that he received in the emergency room.  He has developed bad side effects to the hydroxyzine and does not want to take this medication anymore.  At this time patient denies having any thoughts about harming himself or harming others and he has been able to contract for safety.  Denies having any hallucinations and no delusions have been elicited.      When talking about medications patient tells me that he has to stay away form all selective serotonin reuptake inhibitor because they cause more harm.  He also has to stay away form antipsychotics, they do nothing and \"screw him up\" (EPS?).  Patient was not able to give me a list of prior medications use.he  believed that he took Abilify and this medication was not beneficial..      I was able to communicate with patient's mother Cleo over the phone with patient present during the interview.  Mother was also very upset about the patient not being started on clomipramine last night.  I have informed the mother the reasons why this was not done.  First I needed for the pharmacy to review the medication and I needed to review the medication myself with the patient.  Mother was also upset about the staff in " the evening and she was constantly asking if we do have nurses working in the unit.  I did confirmed with the mother that we do have a nurse working with the patient on every shift.       Mother reported that the prazosin and the antyhistamine cause more problems and made the patient very anxious. Lorazepam has been the best medication for him, it calms him down, and he is able to function.  Lorazepam is not causing any side effects. The hesitaiton with his outpatient provider to give him this medication is because is related to valium and they do not want to put him on anything that is addictive.  Patient was very used to take Valium as was dependant on it. In 2019 the outpatient providers started to taper down the Valium. Valium was completely discontinued on May 23, 2021.  Mother thinks that after the discontinuation of the Valium the anxiety and the panic started coming forward.  In addition to all of this patient has been experiencing problems with staffing.  There have been a lot of changes for the patient at home.  He does not have the same staff working with him consistently, this has caused a lot of anxiety and has increased patient's obsessive thinking.  Mother reported that he currently the patient doesn't want to get out of bed, is having panick attacks and doesn't want to face the problems with the staff. He can't prepare food for him self, has not shower in a month and half and he is unable to leave his house.      At that moment I discussed with the patient and mother the tentative treatment plan.  First I have placed a consult for the hospitalist to come and see the patient as his blood pressure has been elevated.  Second I have sent a message to the psychologist in order to work with the patient in creating a behavioral plan here in the unit..  I will communicate with the outpatient provider and coordinate cares with her.  In the meantime given that both patient and mother reported that the patient  has done well on lorazepam and I will put him on lorazepam 0.5 mg 3 times a day.  Both mother and patient have verbalized good understanding and they are in agreement with this plan.     After I spoke with her outpatient providers I went back to the unit and met with the patient.  I reviewed with the patient the information that he was provided by the outpatient provider.  I discussed with the patient the use of Pristiq including reasons for prescribing this, benefits, risk and side effects.  After clarifying patient's questions he informed me is in agreement with a trial of Pristiq.     As per outpatient provider:   Patient has 2 different records in care everywhere and he has 2 different addresses.     Outpatient provider Rosanna Flores informed me that patient has been experiencing problems with the ability to cope with live, change and his anxiety. Valium was discontinue and the patient tolerated this well. More recently the problems is staffing. Staff has been quiting and he has been without staff.  Patient had an [facility] assessment that said that he needs 24/7 assistance.  This has led to more panic, patient making coments to wanting to kill him self because he wants to have people around him.  This cycle that is unbreakable at this moment.  The anxiety is not undercontrol.  Mark informed me that she wanted to take him off Clomipramine as this has not work to treat his anxiety, but the patient is not willing to change the medication at this time or increase the dose of clonazepam. Patient is convinced any other medication will cause problems.  In the past when the patient gets a new medication he asked for the staff that works with him to google the medication for side effects for the parents will do that for him.  He had [facility] assessment done years ago.  Outpatient provider thinks that the patient can benefit from a trial of Pristiq.  She will be faxing the [facility] assessment and her last  "note.     Later on I did receive both and indeed in the [facility] assessment it said that Pristiq is a medication that can be tried on the patient.  On her progress note said that the patient has tried Prozac, Lexapro, Celexa, Remeron, trazodone, temazepam, clomipramine, Abilify, Seroquel, Zyprexa, Depakote, diazepam, lorazepam, hydroxyzine, clonidine, guanfacine, propanolol, propanolol LA, melatonin, Benadryl and l-methylfolate.       CHIEF COMPLAINT   \"hello\"       SUBJECTIVE/OBJECTIVE   Patient was seen and evaluated in the day area by himself, this was a face-to-face evaluation.  Patient is known to this writer as I was the provider that did the history and physical for him.  Patient reported that he did not did well over the weekend as he was arguing with his mother.  Then he tells me that he believes his mother is the one that is driving his anxiety.  Patient admitted that every time he talks to his mother he becomes more agitated.  He is not sure if the medications are helpful but understands that he needs to give them time to start working.  Patient tells me that he has been having problems with memory, comprehension and problem solving.  But at the same time he was able to tell me his treatment plan and the discussions he had with Dr. Beard last week.  Patient continues to have a lot of anxiety, obsessions and he perseverates a lot about his medications.  I have encouraged the patient to follow the behavioral plan that the psychologist initiated, but patient was dismissive of this.  He is still not sure if he will be able to return home the way that he is right now and has a lot of anxiety about the new providers and the new in-home services.      I have informed the patient that for now the plan is to continue the medications at the same doses and wait and see how he does during the week.  Patient also ask for an extra dose of melatonin as he is not able to sleep well when he takes only 10 " mg.    Multidisciplinary team meeting was held today.  See comprehensive multidisciplinary treatment plan for full details.        CONSULTATIONS   [place] s Anxiety / High Emotion Plan  Signs that my emotions (or anxiety) are increasin.  Shaky/tremors  2.  Hot/sweaty  3.  Hands/feet tingly/prickly   4.  Hot/cold parts in body  5.  Headache   6.  Start to cry   7. Scream  8. Call out names of loved ones  9. Repeating self, talk in circles     Skills I can try to use when my emotions are increasing (*I may need to try and use multiple skills over and over)  1.  Weighted blanket  2.  Headphones   3. Sound machine  4. Puzzles  5. Play [place]   6. Try a skill from coping list à  7. Count forwards and backwards  8. Say ABCs  9. Breathing exercise  10.  Reach out and asked to talk to staff  11.  Take a PRN     Things staff can do to support me when my emotions are increasing  1.  Sit down and talk with patient  2. Encourage patient to use skills    Splash cold water on face, take a cold shower  Squeeze a rubber ball hard  5,4,3,2,1  5 things you can see around you (sometimes helps to spell the items out - forces you to slow down even more)  4 things you can touch  3 things you can hear  2 things you can smell  1 thing you can taste   Hold something and really focus on it  Distract yourself  Pick a color:  how many things in different shades of that color can you see around the room or out the window?  (still feeling stressed?  Pick another color)  Count backwards by 7 from 100  Slow deep breathing  Exhaling longer than you inhale forces the parasympathetic system to active - slowing down your heartrate, blood flow, and thoughts.  Mindfulness of body (focus on one body part to start if it s too hard to do the whole body)  Feet: wiggle your toes in your shoe/sock. Are your feet warm or cold? Dry or sweaty? Notice them pushing into the ground.       3. Walk patient through skills if needed                  Andra  "Sacha Ibarra, LP    Date: 7/9/2021        Allergies     Patient has no known allergies.    MEDICATIONS     Scheduled Meds:    clomiPRAMINE  25 mg Oral DAILY     clomiPRAMINE  50 mg Oral QHS     desvenlafaxine succinate  50 mg Oral DAILY     LORazepam  1 mg Oral TID     prazosin  1 mg Oral QHS       PRN Meds:  acetaminophen, aluminum-magnesium hydroxide-simethicone, baclofen, hydrALAZINE, melatonin, polyethylene glycol    Medication adherence: Reviewed risk/benefits of medication , Patient able to verbalize understanding of side effects  and Patient verbally consents to taking medications  Medication side effects: none  Benefit from medications: minimal       ROS   Pertinent items are noted in HPI.         MENTAL STATUS EXAM   Vitals: /86 (Patient Position: Standing)   Pulse (!) 103   Temp 98.3  F (36.8  C) (Oral)   Resp 18   Ht 5' 9\" (1.753 m)   Wt 202 lb 9 oz (91.9 kg)   SpO2 97%   BMI 29.91 kg/m        Level of Consciousness:  alert and oriented  Appearance:  Disheveled, poor eye contact  Attitude:  cooperative and polite  Speech:  Within normal   Regular rate, rhythm, volume and tone  Language ability: intact  Mood:  \" I am not doing good\"  Affect: Calmer overall but still has emotional outbursts and crying-improving  Suicidal Ideation: absent  Homicidal Ideation: absent  Thought formation: concrete  Thought content: Obsession, very perseverative  Fundamentals of Knowledge: Sufficient  Attention/Concentration: okay  Memory: intact.   Insight and Judgement: impaired judgment and impaired insight  Orientation: person, place, time and situation  Psychomotor Behavior: WNL  Muscle Strength and Tone: normal  Gait and Station: intact       LABS   personally reviewed.   Recent Results (from the past 48 hour(s))   SARS-CoV-2 (COVID-19)-PCR    Specimen: Respiratory   Result Value Ref Range    SARS-CoV-2 PCR Result Negative Negative, Invalid     No results found for: PHENYTOIN, PHENOBARB, VALPROATE, CBMZ   "     IMAGING     No new Imaging       DIAGNOSIS   Principal Problem:    OCD (obsessive compulsive disorder)    Active Problem List:  Patient Active Problem List   Diagnosis     OCD (obsessive compulsive disorder)     Autism spectrum disorder     Panic disorder with agoraphobia     PTSD (post-traumatic stress disorder)     Generalized anxiety disorder     Major depressive disorder, recurrent episode, moderate (H)     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 Ongoing education given regarding diagnostic and treatment options with risks, benefits and alternatives and adequate verbalization of understanding.  2. Continue medication management given beneficial response.   3.  to follow in regards to collecting and reviewing collateral information, referrals and disposition planning.    4.  Psychiatric Plan Of Care:        Clomipramine 25 mg daily and 50 mg at bedtime       Pristiq 50 mg daily       Ativan 1 mg 3 times a day       Prazosin 1 mg at bedtime  5.  Substance Use Plan Of Care:        N/A    6.  Medical Plan Of Care:       N/A  7.  Legal Plan of Care       Legal Status: voluntary           8.  Risk Assessment & Milieu Plan of Care:       Risk Assessment: Patient on precautions       Encourage the patient to participate in unit activities.       Further treatment programming to be determined throughout the hospital course.    Coordination of Care:   Treatment Plan reviewed and physician signed, Care discussed with Care/Treatment Team Members, Chart reviewed, Patient seen and Care reviewed with family/caretakers    Re-Certification I certify that the inpatient psychiatric facility services furnished since the previous certification were, and continue to be, medically necessary for, either, treatment which could reasonably be expected to improve the patient s condition or diagnostic study and that the hospital records indicate that the services furnished were, either, intensive  treatment services, admission and related services necessary for diagnostic study, or equivalent services.     I certify that the patient continues to need, on a daily basis, active treatment furnished directly by or requiring the supervision of inpatient psychiatric facility personnel.   I estimate 3-7 more days of hospitalization is necessary for proper treatment of the patient. My plans for post-hospital care for this patient are [facility] Care List: Home with follow-up     Amber Tyson MD     -     7/12/2021     -     10 AM    Total time 35 minutes with > 50% spent on coordination of cares and psycho-education.    This note was created with help of Dragon dictation system. Grammatical / typing errors are not intentional.    Amber Tyson MD

## 2021-07-12 NOTE — PLAN OF CARE
Problem: Behavioral Health Plan of Care  Goal: Plan of Care Review  Outcome: Improving  Flowsheets  Taken 7/12/2021 1828  Plan of Care Reviewed With: patient  Patient Agreement with Plan of Care: agrees  Taken 7/12/2021 1822  Plan of Care Reviewed With: patient  Patient Agreement with Plan of Care: agrees  Goal: Adheres to Safety Considerations for Self and Others  Outcome: Improving  Goal: Optimized Coping Skills in Response to Life Stressors  Outcome: Improving     Problem: Suicidal Behavior  Goal: Suicidal Behavior is Absent or Managed  Outcome: Improving     Patient denies suicidal and homicidal ideation and contracts for safety. He was visible in the milieu after dinner and was social with peers, with whom he played board games. He requested PRN baclofen for anxiety (7/10.) Depression was 5/10. Patient talked to his mom over the but did not seem upset afterwards.

## 2021-07-12 NOTE — PLAN OF CARE
Problem: Relapse Prevention  Goal: Engage in OT Group  Description: Target: Patient will engaged in 1 OT group activity daily that supports recovery this reporting period  Outcome: Improving   Goal review completed:     Patient has attended 3 out of 11 groups offered this reporting period. Met with patient to review OT goal and discuss weighted blanket. Pt shared he feels he needs a heavier weighted blanket for symptom management. Pt was provided with a 25 pound weighted blanket. Pt agreeable to follow up with OT at a later date. Pt shared he enjoys participating in the hands on activities. Care plan goals have been reviewed and are appropriate. Continue to establish rapport and encourage group participation with focus on goal area/s for further progress. Continue to correspond with interdisciplinary team regarding ongoing treatment plan, potential changes in patient's status, and discharge planning.    Therapist: Suzanna Kaur, OTR  7/12/2021

## 2021-07-13 PROCEDURE — 250N000013 HC RX MED GY IP 250 OP 250 PS 637: Performed by: PSYCHIATRY & NEUROLOGY

## 2021-07-13 PROCEDURE — 128N000001 HC R&B CD/MH ADULT

## 2021-07-13 PROCEDURE — 250N000013 HC RX MED GY IP 250 OP 250 PS 637

## 2021-07-13 PROCEDURE — 99233 SBSQ HOSP IP/OBS HIGH 50: CPT | Performed by: PSYCHIATRY & NEUROLOGY

## 2021-07-13 PROCEDURE — 90832 PSYTX W PT 30 MINUTES: CPT | Performed by: PSYCHOLOGIST

## 2021-07-13 RX ADMIN — ACETAMINOPHEN 650 MG: 325 TABLET ORAL at 21:07

## 2021-07-13 RX ADMIN — Medication 2.5 MG: at 11:19

## 2021-07-13 RX ADMIN — PRAZOSIN HYDROCHLORIDE 1 MG: 1 CAPSULE ORAL at 20:32

## 2021-07-13 RX ADMIN — CLOMIPRAMINE HYDROCHLORIDE 25 MG: 25 CAPSULE ORAL at 09:06

## 2021-07-13 RX ADMIN — DESVENLAFAXINE 50 MG: 50 TABLET, FILM COATED, EXTENDED RELEASE ORAL at 09:06

## 2021-07-13 RX ADMIN — LORAZEPAM 1 MG: 0.5 TABLET ORAL at 09:06

## 2021-07-13 RX ADMIN — Medication 15 MG: at 21:07

## 2021-07-13 RX ADMIN — LORAZEPAM 1 MG: 0.5 TABLET ORAL at 20:32

## 2021-07-13 RX ADMIN — LORAZEPAM 1 MG: 0.5 TABLET ORAL at 13:16

## 2021-07-13 RX ADMIN — CLOMIPRAMINE HCL 50 MG: 50 CAPSULE ORAL at 20:32

## 2021-07-13 ASSESSMENT — ACTIVITIES OF DAILY LIVING (ADL)
ORAL_HYGIENE: INDEPENDENT
DRESS: SCRUBS (BEHAVIORAL HEALTH)

## 2021-07-13 NOTE — PROGRESS NOTES
07/13/21 0930   Engagement   Intervention Group   Topic Detail OT: Education regarding the 8 Dimensions of Wellness and interactive social activity (Bananagrams) to increase intellectual wellness, cognitive processing speed, memory recall, visual scanning, healthy leisure engagement, social wellness, and social engagement   Attendance Did not attend   Reason for Not Attending Refused   Patient Response Used disrespectful tone   Mood/Affect Tearful     Pt did not attend after 1:1 invite.

## 2021-07-13 NOTE — PROGRESS NOTES
07/13/21 1415   Engagement   Intervention Group   Topic Detail OT: Education regarding the 8 Dimensions of Wellness and hands on creative hands on endeavor (watercolor) to increase concentration, attention to task/detail, problem solving, creative expression, coping with stress, healthy leisure engagement, and social engagement   Attendance Did not attend     Pt meeting with provider during invite and therapist unable to return for second invite.

## 2021-07-13 NOTE — PROGRESS NOTES
7/13/21  1:00PM   Engagement   Intervention Group   Topic DBT Skills   Topic Detail ACCEPTS   Attendance Did to attend. Patient was involved in interview with another provider during commencement of group. He seemed to peek into group through window but left area upon approach of facilitator inviting him to join.

## 2021-07-13 NOTE — PLAN OF CARE
Problem: Behavioral Health Plan of Care  Goal: Adheres to Safety Considerations for Self and Others  Outcome: Improving  Goal: Absence of New-Onset Illness or Injury  Outcome: Improving  Goal: Optimized Coping Skills in Response to Life Stressors  Outcome: Improving  Goal: Develops/Participates in Therapeutic [place] to Support Successful Transition  Outcome: Improving     Problem: Suicidal Behavior  Goal: Suicidal Behavior is Absent or Managed  Outcome: Improving     Isolative to room earlier on the shift, out during lunch and snack,  Stayed in the lounge area briefly after lunch.  Rates anxiety and depression at 8, denies SI/HI and contracts for safety.  Did not attend groups, ate about 90% of his lunch,   Cried for about 30 mins this am, other wise no abnormal or violent behaviors noted. Compliant with meds.  Requested for and was given prn baclofen 2.5 mg for anxiety.

## 2021-07-13 NOTE — PROGRESS NOTES
"   PSYCHIATRY  PROGRESS NOTE     DATE OF SERVICE   July 12, 2021     Progress notes have been reviewed     INFORMATION REGARDING HOSPITALIZATION    Per 7/2/2021 H&P from Dr. Roche  This is a 41 y.o. male with history of OCD (obsessive compulsive disorder), patient is single, with no children domiciled by him self in a town home but he has a care team from a private company, patient is on disability; that was admitted to the psychiatric inpatient unit due to severe depression and inability to take care of himself due to multiple recent medication changes.     Today patient was seen and evaluated in the consult room with nurse practitioner present during the assessment, this was a face-to-face evaluation.  Patient presented during the assessment extremely anxious, perseverating on his medications and I did again not able to provide accurate information.  His thought process is very concrete and he clearly has multiple obsessions.  Patient was perseverating on him not receiving his clomipramine and prazosin last night.  I informed the patient that prior to starting any medications we need to verify with the pharmacy and I always like to meet with the patient is to make sure that the medications the pharmacy reviewed are correct.  At the end patient verbalized good understanding and was in agreement with proceeding with the assessment.     Patient said that he had a huge fall because one of the medications that he is outpatient provider prescribed.  He was supposed to take this medication as needed for anxiety (Periactin).  Last Monday at 6 in the morning was when he had his fall and patient thinks that the new medication \"screwed up with the melatonin\". He was drowsy, foggy, got up and he fell down the stairs.  Patient tells me that he agreed to come to the hospital because he needs some to do a thorough look at he is medications.  Patient was extremely anxious at the moment of the assessment and he was unable to " "fully talk about his symptoms and how he has been feeling at home.  Patient did reported that he is nervous, anxious, overlwhelmed, scared because he wants things to get better.  Patient verbalized having problems with anxiety and depression. Ever since the accident last Monday this has been worst. Has memory problems, sensory problems, concentration is poor and he even said that he has problems understanding the suzanne here in the unit.  Patient was very upset about not taking the clomipramine this morning but at the same time patient tells me that he doesn't think the Clomipramine has been helpful, and the outpatient psychiatrist has been thinking about taking it away.  Patient said that over the last few days the only medication that was helpful was the lorazepam that he received in the emergency room.  He has developed bad side effects to the hydroxyzine and does not want to take this medication anymore.  At this time patient denies having any thoughts about harming himself or harming others and he has been able to contract for safety.  Denies having any hallucinations and no delusions have been elicited.      When talking about medications patient tells me that he has to stay away form all selective serotonin reuptake inhibitor because they cause more harm.  He also has to stay away form antipsychotics, they do nothing and \"screw him up\" (EPS?).  Patient was not able to give me a list of prior medications use.he  believed that he took Abilify and this medication was not beneficial..      I was able to communicate with patient's mother Cleo over the phone with patient present during the interview.  Mother was also very upset about the patient not being started on clomipramine last night.  I have informed the mother the reasons why this was not done.  First I needed for the pharmacy to review the medication and I needed to review the medication myself with the patient.  Mother was also upset about the staff in " the evening and she was constantly asking if we do have nurses working in the unit.  I did confirmed with the mother that we do have a nurse working with the patient on every shift.       Mother reported that the prazosin and the antyhistamine cause more problems and made the patient very anxious. Lorazepam has been the best medication for him, it calms him down, and he is able to function.  Lorazepam is not causing any side effects. The hesitaiton with his outpatient provider to give him this medication is because is related to valium and they do not want to put him on anything that is addictive.  Patient was very used to take Valium as was dependant on it. In 2019 the outpatient providers started to taper down the Valium. Valium was completely discontinued on May 23, 2021.  Mother thinks that after the discontinuation of the Valium the anxiety and the panic started coming forward.  In addition to all of this patient has been experiencing problems with staffing.  There have been a lot of changes for the patient at home.  He does not have the same staff working with him consistently, this has caused a lot of anxiety and has increased patient's obsessive thinking.  Mother reported that he currently the patient doesn't want to get out of bed, is having panick attacks and doesn't want to face the problems with the staff. He can't prepare food for him self, has not shower in a month and half and he is unable to leave his house.      At that moment I discussed with the patient and mother the tentative treatment plan.  First I have placed a consult for the hospitalist to come and see the patient as his blood pressure has been elevated.  Second I have sent a message to the psychologist in order to work with the patient in creating a behavioral plan here in the unit..  I will communicate with the outpatient provider and coordinate cares with her.  In the meantime given that both patient and mother reported that the patient  has done well on lorazepam and I will put him on lorazepam 0.5 mg 3 times a day.  Both mother and patient have verbalized good understanding and they are in agreement with this plan.     After I spoke with her outpatient providers I went back to the unit and met with the patient.  I reviewed with the patient the information that he was provided by the outpatient provider.  I discussed with the patient the use of Pristiq including reasons for prescribing this, benefits, risk and side effects.  After clarifying patient's questions he informed me is in agreement with a trial of Pristiq.     As per outpatient provider:   Patient has 2 different records in care everywhere and he has 2 different addresses.     Outpatient provider Rosanna Flores informed me that patient has been experiencing problems with the ability to cope with live, change and his anxiety. Valium was discontinue and the patient tolerated this well. More recently the problems is staffing. Staff has been quiting and he has been without staff.  Patient had an [facility] assessment that said that he needs 24/7 assistance.  This has led to more panic, patient making coments to wanting to kill him self because he wants to have people around him.  This cycle that is unbreakable at this moment.  The anxiety is not undercontrol.  Mark informed me that she wanted to take him off Clomipramine as this has not work to treat his anxiety, but the patient is not willing to change the medication at this time or increase the dose of clonazepam. Patient is convinced any other medication will cause problems.  In the past when the patient gets a new medication he asked for the staff that works with him to google the medication for side effects for the parents will do that for him.  He had [facility] assessment done years ago.  Outpatient provider thinks that the patient can benefit from a trial of Pristiq.  She will be faxing the [facility] assessment and her last  "note.     Later on I did receive both and indeed in the [facility] assessment it said that Pristiq is a medication that can be tried on the patient.  On her progress note said that the patient has tried Prozac, Lexapro, Celexa, Remeron, trazodone, temazepam, clomipramine, Abilify, Seroquel, Zyprexa, Depakote, diazepam, lorazepam, hydroxyzine, clonidine, guanfacine, propanolol, propanolol LA, melatonin, Benadryl and l-methylfolate.       CHIEF COMPLAINT   \"hello\"       SUBJECTIVE/OBJECTIVE   Patient was seen and evaluated at bedside by himself, this was a face-to-face evaluation.  Initially the patient thought that he had met with this writer earlier today.  I clarified with the patient that he did met with the psychologist but we have not talked today.  Patient eventually agreed to meet with me in his room.  Patient presented as anxious with pressured speech, talking about multiple things at once.  Patient was difficult to redirect but eventually he was able to calm down and we were able to have a conversation.  Patient tells me that he wants to go home but he feels that his anxiety is still not under control.  I pointed out to the patient that every day it seems that he is doing better.  Yes he still has episodes of severe anxiety in the morning but these episodes have been less frequent and less intense.  Patient did agreed with this statement.  I also reminded the patient that what we do here is crisis stabilization and he will continue working with his outpatient psychiatrist and therapist on how to manage his anxiety.  Patient did agree with this statement and tells me that this morning he did had an episode of extreme anxiety because the community meeting was moved to 9 AM and he was not able to speak with his mother.  Patient did said that he had an episode of crying but this was short-lived and after that he has been able to socialize with his peers.    Patient continues to complain about having problems with " concentration and his memory.  He is asking for this writer to make adjustments on his medications in order to treat these.  On the other hand patient is able to talk to me about all of the medications he has tried in the past, side effects and doses.  I explained to the patient that I think that part of his problems with concentration and memory I had related to his anxiety.  We did not made medications adjustments recently and we will need to wait and see if the medications are going to be effective.    At this time patient is denying having any thoughts about harming himself or harming others.  He understands that next Thursday he is going to have a meeting with his  and the new home care providers.  Once he has services in place again then we are going to start planning for him to go home.    According to the nursing staff: Patient denies suicidal and homicidal ideation and contracts for safety. He was visible in the milieu after dinner and was social with peers, with whom he played board games. He requested PRN baclofen for anxiety (7/10.) Depression was 5/10. Patient talked to his mom over the but did not seem upset afterwards.      CONSULTATIONS   Glenroy amin Anxiety / High Emotion Plan  Signs that my emotions (or anxiety) are increasin.  Shaky/tremors  2.  Hot/sweaty  3.  Hands/feet tingly/prickly   4.  Hot/cold parts in body  5.  Headache   6.  Start to cry   7. Scream  8. Call out names of loved ones  9. Repeating self, talk in circles     Skills I can try to use when my emotions are increasing (*I may need to try and use multiple skills over and over)  1.  Weighted blanket  2.  Headphones   3. Sound machine  4. Puzzles  5. Play [place]   6. Try a skill from coping list à  7. Count forwards and backwards  8. Say ABCs  9. Breathing exercise  10.  Reach out and asked to talk to staff  11.  Take a PRN     Things staff can do to support me when my emotions are increasing  1.  Sit down and talk  "with patient  2. Encourage patient to use skills    Splash cold water on face, take a cold shower  Squeeze a rubber ball hard  5,4,3,2,1  5 things you can see around you (sometimes helps to spell the items out - forces you to slow down even more)  4 things you can touch  3 things you can hear  2 things you can smell  1 thing you can taste   Hold something and really focus on it  Distract yourself  Pick a color:  how many things in different shades of that color can you see around the room or out the window?  (still feeling stressed?  Pick another color)  Count backwards by 7 from 100  Slow deep breathing  Exhaling longer than you inhale forces the parasympathetic system to active - slowing down your heartrate, blood flow, and thoughts.  Mindfulness of body (focus on one body part to start if it s too hard to do the whole body)  Feet: wiggle your toes in your shoe/sock. Are your feet warm or cold? Dry or sweaty? Notice them pushing into the ground.       3. Walk patient through skills if needed                  Andra Ibarra PsyD, EAMON    Date: 7/9/2021        Allergies     Patient has no known allergies.    MEDICATIONS     Scheduled Meds:    clomiPRAMINE  25 mg Oral DAILY     clomiPRAMINE  50 mg Oral QHS     desvenlafaxine succinate  50 mg Oral DAILY     LORazepam  1 mg Oral TID     prazosin  1 mg Oral QHS       PRN Meds:  acetaminophen, aluminum-magnesium hydroxide-simethicone, baclofen, hydrALAZINE, melatonin, polyethylene glycol    Medication adherence: Reviewed risk/benefits of medication , Patient able to verbalize understanding of side effects  and Patient verbally consents to taking medications  Medication side effects: none  Benefit from medications: minimal       ROS   Pertinent items are noted in HPI.         MENTAL STATUS EXAM   Vitals: /86 (Patient Position: Standing)   Pulse (!) 103   Temp 98.3  F (36.8  C) (Oral)   Resp 18   Ht 5' 9\" (1.753 m)   Wt 202 lb 9 oz (91.9 kg)   SpO2 97%   BMI " "29.91 kg/m        Level of Consciousness:  alert and oriented  Appearance:  Disheveled, better eye contact  Attitude:  cooperative and polite  Speech:  speech is pressured  Language ability: intact  Mood:  \" I am anxious\"  Affect: Anxious but reactive and able to respond to humor  Suicidal Ideation: absent  Homicidal Ideation: absent  Thought formation: concrete  Thought content: Obsession, very perseverative  Fundamentals of Knowledge: Sufficient  Attention/Concentration: okay  Memory: intact.   Insight and Judgement: impaired judgment and impaired insight  Orientation: person, place, time and situation  Psychomotor Behavior: WNL  Muscle Strength and Tone: normal  Gait and Station: intact       LABS   personally reviewed.   Recent Results (from the past 48 hour(s))   SARS-CoV-2 (COVID-19)-PCR    Specimen: Respiratory   Result Value Ref Range    SARS-CoV-2 PCR Result Negative Negative, Invalid     No results found for: PHENYTOIN, PHENOBARB, VALPROATE, CBMZ       IMAGING     No new Imaging       DIAGNOSIS   Principal Problem:    OCD (obsessive compulsive disorder)    Active Problem List:  Patient Active Problem List   Diagnosis     OCD (obsessive compulsive disorder)     Autism spectrum disorder     Panic disorder with agoraphobia     PTSD (post-traumatic stress disorder)     Generalized anxiety disorder     Major depressive disorder, recurrent episode, moderate (H)     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 Ongoing education given regarding diagnostic and treatment options with risks, benefits and alternatives and adequate verbalization of understanding.  2. Continue medication management given beneficial response.   3.  to follow in regards to collecting and reviewing collateral information, referrals and disposition planning.    4.  Psychiatric Plan Of Care:        Clomipramine 25 mg daily and 50 mg at bedtime       Pristiq 50 mg daily       Ativan 1 mg 3 times a day       " Prazosin 1 mg at bedtime  5.  Substance Use Plan Of Care:        N/A    6.  Medical Plan Of Care:       N/A  7.  Legal Plan of Care       Legal Status: voluntary           8.  Risk Assessment & Milieu Plan of Care:       Risk Assessment: Patient on precautions       Encourage the patient to participate in unit activities.       Further treatment programming to be determined throughout the hospital course.    Coordination of Care:   Treatment Plan reviewed and physician signed, Care discussed with Care/Treatment Team Members, Chart reviewed, Patient seen and Care reviewed with family/caretakers    Re-Certification I certify that the inpatient psychiatric facility services furnished since the previous certification were, and continue to be, medically necessary for, either, treatment which could reasonably be expected to improve the patient s condition or diagnostic study and that the hospital records indicate that the services furnished were, either, intensive treatment services, admission and related services necessary for diagnostic study, or equivalent services.     I certify that the patient continues to need, on a daily basis, active treatment furnished directly by or requiring the supervision of inpatient psychiatric facility personnel.   I estimate 3-7 more days of hospitalization is necessary for proper treatment of the patient. My plans for post-hospital care for this patient are Behavioral Post Hospital Care List: Home with follow-up     Amber Tong     -     7/12/2021     -2:29 PM    Total time 35 minutes with > 50% spent on coordination of cares and psycho-education.    This note was created with help of Dragon dictation system. Grammatical / typing errors are not intentional.    Amber Tyson MD

## 2021-07-13 NOTE — PROGRESS NOTES
"Psychology Psychotherapy  Note       Name:  Glenroy Adler  :  1980  MRN:  [id number]      Date: 2021  Duration:  20 minutes (12:30-12:50pm)     Target Symptoms:    The patient was seen in light of concerns regarding symptoms of mood lability, crying out, OCD and severe anxiety.     Participation:  The patient was able to participate and benefit from treatment as evidenced by his verbal expression of ideas and initiation of topics discussed.     Mental Status:  Level of Consciousness:  alert  Appearance:  APPEARANCE: No apparent distress, Casually groomed and Appears stated age  Attitude:  Attitude: open and cooperative  Speech:  Speech: normal rate, production, volume, and rhythm of speech, mildly excessive at times  Language ability: intact  Mood:  \"okay\"  Affect: full range was was congruent to speech  Suicidal Ideation: PRESENT / ABSENT: absent   Homicidal Ideation: PRESENT / ABSENT: absent   Thought formation: THOUGHT PROCESS (ASSOCIATIONS): Logical, Linear and Goal directed  Thought content: denies suicidal ideation, violent ideation and delusions.   Fundamentals of Knowledge: Average  Attention/Concentration: Normal  Memory: Immediate recall intact, Short-term memory intact and Long-term memory intact  Insight:  good and adequate.  Judgement: good and fair  Orientation: Yes, x4  Psychomotor Behavior: normal or unremarkable    Estimated IQ: IQ: average    These cognitive functions grossly appear as described, but were not formally tested.          Intervention:    Writer approached patient in the milieu where he was eating his lunch.  Patient stated they got his food tray wrong so he had to wait for it.  Writer applauded patient for being skillful earlier this morning and using effective skills to manage his mood.  Patient stated he could have done better.  Writer validated and refocused patient on his improvement from in the past which patient was receptive to.  Patient complained briefly " "about staff allegedly changing the time when groups occur so he couldn't call his mom.  However, he had attempted to call his mom this morning at the usual time but she was busy on the line with his new care team.  By the time she was available, the regularly scheduled group had started and the phones were turned off.  Patient was upset and cried out for approximately 8.5 minutes according to nursing, went to his room, did not slam his door, and used his weighted blanket to self soothe.  Patient was engaging in a lot more behavior talk today including how he didn't want to get out of bed but he knew he needed to and that it would be helpful to talk with a friend his has made here.  Writer observed his skill use again and applauded him for it.  Patient stated it's hard for him to take it in as his mom is reportedly critical and doesn't observe or acknowledge improvement unless it is \"100% gone\".  Writer empathized with patient and refocused him on recognizing his own improvement for himself.  Agreed to check in a few days.     Psychotherapeutic Techniques: Interpersonal Psychotherapy, Cognitive-behavioral therapy, motivational interviewing and supportive psychotherapy strategies were utilized.     Necessity:    The session was necessary for the care of the patient to address symptoms of mood lability, crying out, OCD and severe anxiety.     Progress:    Patient has shown improvement in his ability to utilize coping skills to address symptoms of emotion regulation.  He had a brief emotional outburst this morning but was able to regulate, return to his room and use his weighted blanket appropriately. He is also out of his room socializing more.     Plan:    This writer will continue to meet with the patient on a regular basis while in the hospital to address mental health symptoms by continuing to utilize cognitive-behavioral and supportive psychotherapy.     Diagnosis:    Depression  OCD by history  Autism Spectrum " Disorder  PTSD  Panic Disorder with Agoraphobia  Dependent Personality Traits       Provider: Andra Ibarra PsyD, LP  Date: 7/13/2021

## 2021-07-13 NOTE — PLAN OF CARE
Patient was calm and slept for about 6 hours. Safety checks were completed every 15 minutes. No pain was reported, patient denied anxiety, depression and hallucination.No PRN was given. We will continue to monitor.

## 2021-07-13 NOTE — PROGRESS NOTES
Assessment/Intervention/Current Symtoms and Care Coordination  Writer spoke to patient regarding meet and greet/intake appointment on Thursday at 11:30 am. Patient expressed that he will work on questions to ask during the meeting. Patient will read exercise on self compassion in redefining and broadening perspective on self esteem. Patient noted no concerns at this time. Patient has increased his ability to self regulate with limited contact with mother. Patient was fidel to focus on other topics during conversation.      Writer spoke to Jennifer regarding meet and greet/intake interview with patient for 11:30 am Friday. Antonio from [facility] has staff available for immediate staffing starting Friday. Due to patient's struggle with change. Antonio Hawthorne and patient will discuss discharge plans that would minimize staff change post discharge. Tentative discussion for discharge Friday Vs Monday of next week. Writer has updated visiting list with scheduled meeting for Thursday    Writer left message for KINGSLEY Mccartney with update on discharge plans.    Writer spoke to mom regarding medications, medication changes, patient's emotional regulation plan and tentative discharge plans that may include Friday or Monday.       Discharge Plan or Goal Return to [facility], with [facility]    Barriers to Discharge Symptom Stabilization, Medication Management, Care Coordination      Referral Status-Pending     [facility] [place]   [facility]   [facility]  Marine Sweet:  [phone]   Tom Galol: [phone]      Legal Status Voluntary      Christy Braden MA [facility] CCW, 7/13/2021, 2:13 PM

## 2021-07-14 LAB
TSH SERPL DL<=0.005 MIU/L-ACNC: 1.65 UIU/ML (ref 0.3–5)
VIT B12 SERPL-MCNC: 461 PG/ML (ref 213–816)

## 2021-07-14 PROCEDURE — 128N000001 HC R&B CD/MH ADULT

## 2021-07-14 PROCEDURE — 36415 COLL VENOUS BLD VENIPUNCTURE: CPT | Performed by: PSYCHIATRY & NEUROLOGY

## 2021-07-14 PROCEDURE — 93010 ELECTROCARDIOGRAM REPORT: CPT | Mod: HIP | Performed by: INTERNAL MEDICINE

## 2021-07-14 PROCEDURE — 250N000013 HC RX MED GY IP 250 OP 250 PS 637: Performed by: PSYCHIATRY & NEUROLOGY

## 2021-07-14 PROCEDURE — 999N000157 HC STATISTIC RCP TIME EA 10 MIN

## 2021-07-14 PROCEDURE — 80335 ANTIDEPRESSANT TRICYCLIC 1/2: CPT | Performed by: PSYCHIATRY & NEUROLOGY

## 2021-07-14 PROCEDURE — 84443 ASSAY THYROID STIM HORMONE: CPT | Performed by: PSYCHIATRY & NEUROLOGY

## 2021-07-14 PROCEDURE — 82306 VITAMIN D 25 HYDROXY: CPT | Performed by: PSYCHIATRY & NEUROLOGY

## 2021-07-14 PROCEDURE — 93005 ELECTROCARDIOGRAM TRACING: CPT | Performed by: PSYCHIATRY & NEUROLOGY

## 2021-07-14 PROCEDURE — 99233 SBSQ HOSP IP/OBS HIGH 50: CPT | Performed by: PSYCHIATRY & NEUROLOGY

## 2021-07-14 PROCEDURE — 82607 VITAMIN B-12: CPT | Performed by: PSYCHIATRY & NEUROLOGY

## 2021-07-14 PROCEDURE — 250N000013 HC RX MED GY IP 250 OP 250 PS 637

## 2021-07-14 RX ADMIN — ACETAMINOPHEN 650 MG: 325 TABLET ORAL at 13:40

## 2021-07-14 RX ADMIN — LORAZEPAM 1 MG: 0.5 TABLET ORAL at 13:40

## 2021-07-14 RX ADMIN — PRAZOSIN HYDROCHLORIDE 1 MG: 1 CAPSULE ORAL at 20:34

## 2021-07-14 RX ADMIN — LORAZEPAM 1 MG: 0.5 TABLET ORAL at 08:55

## 2021-07-14 RX ADMIN — CLOMIPRAMINE HCL 50 MG: 50 CAPSULE ORAL at 20:35

## 2021-07-14 RX ADMIN — Medication 2.5 MG: at 15:16

## 2021-07-14 RX ADMIN — LORAZEPAM 1 MG: 0.5 TABLET ORAL at 20:35

## 2021-07-14 RX ADMIN — CLOMIPRAMINE HYDROCHLORIDE 25 MG: 25 CAPSULE ORAL at 08:56

## 2021-07-14 RX ADMIN — Medication 2.5 MG: at 06:47

## 2021-07-14 RX ADMIN — Medication 15 MG: at 20:34

## 2021-07-14 RX ADMIN — DESVENLAFAXINE 50 MG: 50 TABLET, FILM COATED, EXTENDED RELEASE ORAL at 08:55

## 2021-07-14 ASSESSMENT — ACTIVITIES OF DAILY LIVING (ADL): HYGIENE/GROOMING: INDEPENDENT

## 2021-07-14 NOTE — PLAN OF CARE
Problem: Behavioral Health Plan of Care  Goal: Plan of Care Review  Outcome: Improving  Flowsheets  Taken 7/14/2021 1628  Patient Agreement with Plan of Care: agrees  Taken 7/14/2021 1616  Plan of Care Reviewed With: patient  Patient Agreement with Plan of Care: agrees  Taken 7/13/2021 2119  Progress: improving  Goal: Adheres to Safety Considerations for Self and Others  Outcome: Improving  Goal: Absence of New-Onset Illness or Injury  Outcome: Improving     Problem: Suicidal Behavior  Goal: Suicidal Behavior is Absent or Managed  Outcome: Improving     Patient denies suicidal and homicidal ideation and contracts for safety. He was noticeably anxious at start of shift and was crying uncontrollably in room. He later became calm and spent the remainder of the evening in room. He did not engage with peers this shift apart from during supper.

## 2021-07-14 NOTE — PLAN OF CARE
"15-minute safety checks in progress, contracted for safety. Denied pain. Rated anxiety 3, depression as 7/10. Med-compliant. Independent with ADLs and ambulation. Uses weighted blanket for anxiety management. Came out for meals. Engaged with select peers. Did not attend the morning group or the social work group, he said he likes the \"hands-on\" groups. He did attend the afternoon OT group. Requested PRN Tylenol for headache 3/10.   Problem: Suicidal Behavior  Goal: Suicidal Behavior is Absent or Managed  7/14/2021 1154 by Denisse Boss, RN  Outcome: Improving  7/14/2021 1150 by Denisse Boss, RN  Outcome: Improving  Flowsheets (Taken 7/14/2021 1150)  Mutually Determined Action Steps (Suicidal Behavior Absent/Managed): other (see comments)     Problem: Pain Acute  Goal: Acceptable Pain Control and Functional Ability  Description: Denied pain.   Outcome: Improving     "

## 2021-07-14 NOTE — PROGRESS NOTES
Assessment/Intervention/Current Symtoms and Care Coordination  Writer and patient met to discuss discharge plans. Patient expressed hopefuleness on finding a trusted staff member within the new agency. Patient was able to identify social support systems other than his mother. He stated that he has a friend that lives in the [place] and was considering communicating as a pen pal. Writer reviewed upcoming meeting with new agency and discharge on Friday. Patient needed some time to absorb information on discharge.    Writer spke to mom. Mom was annoyed that staff had left a note that patient was only to rianna mom one time daily in sticky notes. When writer checked note, note indicated that mom stated that he could call anytime. Mom did not want patient angry with her. Writer updated mom with discharge. Mom was unsure if she could provide transportation at 1 pm  but would let writer know.    Writer updated [facility] CM and New agency with tentative discharge plans for Friday at 1 pm.  The new agency is arranging for staffing on Friday.    Writer spoke to patient regarding meet and greet/intake appointment on Thursday at 11:30 am. Writer left message for KINGSLEY Mccartney with update on discharge plans.      Discharge Plan or Goal Return to [facility], with [facility]    Barriers to Discharge Symptom Stabilization, Medication Management, Care Coordination      Referral Status-Pending     [place] [facility] Antonio Strunk   [facility]   [facility]  [place]ine n[place]:  [phone]   Tom Gallo: [phone]      Legal Status Voluntary      Christy Braden MA [facility] CCW, 7/13/2021, 2:13 PM

## 2021-07-14 NOTE — PLAN OF CARE
Problem: Behavioral Health Plan of Care  Goal: Plan of Care Review  Outcome: Improving  Flowsheets  Taken 7/13/2021 2119  Progress: improving  Patient Agreement with Plan of Care: agrees  Taken 7/12/2021 1828  Plan of Care Reviewed With: patient  Goal: Adheres to Safety Considerations for Self and Others  Outcome: Improving  Goal: Absence of New-Onset Illness or Injury  Outcome: Improving     Problem: Suicidal Behavior  Goal: Suicidal Behavior is Absent or Managed  Outcome: Improving     Patient denies suicidal and homicidal ideation and contracts for safety. He spent the majority of time out in the milieu and engaged with peers. He also played board games with peers. Patient did not seem upset after talking to his mom over the phone. PRN melatonin given at HS.

## 2021-07-14 NOTE — PLAN OF CARE
Patient was calm and slept more than 6 hours. Safety checks were conducted every 15 minutes to ensure his safety. No pain was reported and patient denied depression and  Hallucination  but endorsed anxiety which he rated 5, Baclofen 2.5 mg was given. No other medication was due at this time.

## 2021-07-14 NOTE — PROGRESS NOTES
07/14/21 0930   Engagement   Intervention Group   Topic Detail OT: Education on wellness and interactive social activity (chat pack) with physical movement (resistance band) to increase physical wellness, social wellness, attention to task, reminiscing, recovery planning, social engagement, and coping with stress   Attendance Did not attend   Reason for Not Attending Refused     Pt did not attend after 1:1 invite.

## 2021-07-14 NOTE — PROGRESS NOTES
"   PSYCHIATRY  PROGRESS NOTE     DATE OF SERVICE   July 14, 2021     Progress notes have been reviewed     INFORMATION REGARDING HOSPITALIZATION    Per 7/2/2021 H&P from Dr. Roche  This is a 41 y.o. male with history of OCD (obsessive compulsive disorder), patient is single, with no children domiciled by him self in a town home but he has a care team from a private company, patient is on disability; that was admitted to the psychiatric inpatient unit due to severe depression and inability to take care of himself due to multiple recent medication changes.     Today patient was seen and evaluated in the consult room with nurse practitioner present during the assessment, this was a face-to-face evaluation.  Patient presented during the assessment extremely anxious, perseverating on his medications and I did again not able to provide accurate information.  His thought process is very concrete and he clearly has multiple obsessions.  Patient was perseverating on him not receiving his clomipramine and prazosin last night.  I informed the patient that prior to starting any medications we need to verify with the pharmacy and I always like to meet with the patient is to make sure that the medications the pharmacy reviewed are correct.  At the end patient verbalized good understanding and was in agreement with proceeding with the assessment.     Patient said that he had a huge fall because one of the medications that he is outpatient provider prescribed.  He was supposed to take this medication as needed for anxiety (Periactin).  Last Monday at 6 in the morning was when he had his fall and patient thinks that the new medication \"screwed up with the melatonin\". He was drowsy, foggy, got up and he fell down the stairs.  Patient tells me that he agreed to come to the hospital because he needs some to do a thorough look at he is medications.  Patient was extremely anxious at the moment of the assessment and he was unable to " "fully talk about his symptoms and how he has been feeling at home.  Patient did reported that he is nervous, anxious, overlwhelmed, scared because he wants things to get better.  Patient verbalized having problems with anxiety and depression. Ever since the accident last Monday this has been worst. Has memory problems, sensory problems, concentration is poor and he even said that he has problems understanding the suzanne here in the unit.  Patient was very upset about not taking the clomipramine this morning but at the same time patient tells me that he doesn't think the Clomipramine has been helpful, and the outpatient psychiatrist has been thinking about taking it away.  Patient said that over the last few days the only medication that was helpful was the lorazepam that he received in the emergency room.  He has developed bad side effects to the hydroxyzine and does not want to take this medication anymore.  At this time patient denies having any thoughts about harming himself or harming others and he has been able to contract for safety.  Denies having any hallucinations and no delusions have been elicited.      When talking about medications patient tells me that he has to stay away form all selective serotonin reuptake inhibitor because they cause more harm.  He also has to stay away form antipsychotics, they do nothing and \"screw him up\" (EPS?).  Patient was not able to give me a list of prior medications use.he  believed that he took Abilify and this medication was not beneficial..      I was able to communicate with patient's mother Cleo over the phone with patient present during the interview.  Mother was also very upset about the patient not being started on clomipramine last night.  I have informed the mother the reasons why this was not done.  First I needed for the pharmacy to review the medication and I needed to review the medication myself with the patient.  Mother was also upset about the staff in " the evening and she was constantly asking if we do have nurses working in the unit.  I did confirmed with the mother that we do have a nurse working with the patient on every shift.       Mother reported that the prazosin and the antyhistamine cause more problems and made the patient very anxious. Lorazepam has been the best medication for him, it calms him down, and he is able to function.  Lorazepam is not causing any side effects. The hesitaiton with his outpatient provider to give him this medication is because is related to valium and they do not want to put him on anything that is addictive.  Patient was very used to take Valium as was dependant on it. In 2019 the outpatient providers started to taper down the Valium. Valium was completely discontinued on May 23, 2021.  Mother thinks that after the discontinuation of the Valium the anxiety and the panic started coming forward.  In addition to all of this patient has been experiencing problems with staffing.  There have been a lot of changes for the patient at home.  He does not have the same staff working with him consistently, this has caused a lot of anxiety and has increased patient's obsessive thinking.  Mother reported that he currently the patient doesn't want to get out of bed, is having panick attacks and doesn't want to face the problems with the staff. He can't prepare food for him self, has not shower in a month and half and he is unable to leave his house.      At that moment I discussed with the patient and mother the tentative treatment plan.  First I have placed a consult for the hospitalist to come and see the patient as his blood pressure has been elevated.  Second I have sent a message to the psychologist in order to work with the patient in creating a behavioral plan here in the unit..  I will communicate with the outpatient provider and coordinate cares with her.  In the meantime given that both patient and mother reported that the patient  has done well on lorazepam and I will put him on lorazepam 0.5 mg 3 times a day.  Both mother and patient have verbalized good understanding and they are in agreement with this plan.     After I spoke with her outpatient providers I went back to the unit and met with the patient.  I reviewed with the patient the information that he was provided by the outpatient provider.  I discussed with the patient the use of Pristiq including reasons for prescribing this, benefits, risk and side effects.  After clarifying patient's questions he informed me is in agreement with a trial of Pristiq.     As per outpatient provider:   Patient has 2 different records in care everywhere and he has 2 different addresses.     Outpatient provider Rosanna Flores informed me that patient has been experiencing problems with the ability to cope with live, change and his anxiety. Valium was discontinue and the patient tolerated this well. More recently the problems is staffing. Staff has been quiting and he has been without staff.  Patient had an [facility] assessment that said that he needs 24/7 assistance.  This has led to more panic, patient making coments to wanting to kill him self because he wants to have people around him.  This cycle that is unbreakable at this moment.  The anxiety is not undercontrol.  Mark informed me that she wanted to take him off Clomipramine as this has not work to treat his anxiety, but the patient is not willing to change the medication at this time or increase the dose of clonazepam. Patient is convinced any other medication will cause problems.  In the past when the patient gets a new medication he asked for the staff that works with him to google the medication for side effects for the parents will do that for him.  He had [facility] assessment done years ago.  Outpatient provider thinks that the patient can benefit from a trial of Pristiq.  She will be faxing the [facility] assessment and her last  "note.     Later on I did receive both and indeed in the [facility] assessment it said that Pristiq is a medication that can be tried on the patient.  On her progress note said that the patient has tried Prozac, Lexapro, Celexa, Remeron, trazodone, temazepam, clomipramine, Abilify, Seroquel, Zyprexa, Depakote, diazepam, lorazepam, hydroxyzine, clonidine, guanfacine, propanolol, propanolol LA, melatonin, Benadryl and l-methylfolate.       CHIEF COMPLAINT   \"hello\"       SUBJECTIVE/OBJECTIVE   Patient was seen and evaluated in the day area by him self, this was a face to face evaluation. Patient reported that he has been feeling better and that the medications have been effective. He continues to have episodes of anxiety in the morning mostly related to the change in the routine but he has been able to manage this and use his coping.  Today patient was able to talk about his discharge plans.  I informed the patient that tomorrow he will have a meeting with his new providers and if they I have able to provide care consistently over the weekend he will have the option to leave the hospital on Friday.  Patient tells me that to him this feels that it is too soon and he thinks that he will prefer to leave on Monday.  I informed the patient that this is up to him, he can have the meeting with the new provider and then decide.  Patient verbalized good understanding and is in agreement with the plan.    Patient tells me that his outpatient provider was thinking about doing an EKG, clomipramine level, vitamin D, vitamin B and the thyroid levels.  Patient would like to have this done here in the hospital.    I spoke with patient's mother over the phone. She wanted to have an updated list of patient's medications.  I have provided the list to the mother.  She was also concerned about the patient leaving on Friday.  I reminded her that this will be Christopher's decision and we just want to give him options of leaving on Friday or " Monday.  She verbalized good understanding and tells me that she will be in agreement with what ever Glenroy decides.  She just wants to make sure that he is going to have the care that he needs once he leaves.  I also informed the mother that if the new home care agency is not able to provide consistent care over the weekend then I will delay the discharge for Monday.  She was in agreement with this.    Mother then started telling me that she was very upset at the staff because she found out last night that the patient thinks that she is restricting phone calls to her and this is not the case.  Mother was able to clarify this with the  and the charge nurse.  In general mother reported that she feels good with the treatment the patient has been receiving here and she believes that the meetings with the psychologist have been very helpful.     CONSULTATIONS   Glenroy s Anxiety / High Emotion Plan  Signs that my emotions (or anxiety) are increasin.  Shaky/tremors  2.  Hot/sweaty  3.  Hands/feet tingly/prickly   4.  Hot/cold parts in body  5.  Headache   6.  Start to cry   7. Scream  8. Call out names of loved ones  9. Repeating self, talk in circles     Skills I can try to use when my emotions are increasing (*I may need to try and use multiple skills over and over)  1.  Weighted blanket  2.  Headphones   3. Sound machine  4. Puzzles  5. Play [place]   6. Try a skill from coping list à  7. Count forwards and backwards  8. Say ABCs  9. Breathing exercise  10.  Reach out and asked to talk to staff  11.  Take a PRN     Things staff can do to support me when my emotions are increasing  1.  Sit down and talk with patient  2. Encourage patient to use skills    Splash cold water on face, take a cold shower  Squeeze a rubber ball hard  5,4,3,2,1  5 things you can see around you (sometimes helps to spell the items out - forces you to slow down even more)  4 things you can touch  3 things you can hear  2  "things you can smell  1 thing you can taste   Hold something and really focus on it  Distract yourself  Pick a color:  how many things in different shades of that color can you see around the room or out the window?  (still feeling stressed?  Pick another color)  Count backwards by 7 from 100  Slow deep breathing  Exhaling longer than you inhale forces the parasympathetic system to active - slowing down your heartrate, blood flow, and thoughts.  Mindfulness of body (focus on one body part to start if it s too hard to do the whole body)  Feet: wiggle your toes in your shoe/sock. Are your feet warm or cold? Dry or sweaty? Notice them pushing into the ground.       3. Walk patient through skills if needed                  Andra Ibarra PsyD, EAMON    Date: 7/9/2021        Allergies     Patient has no known allergies.    MEDICATIONS     Scheduled Meds:    clomiPRAMINE  25 mg Oral DAILY     clomiPRAMINE  50 mg Oral QHS     desvenlafaxine succinate  50 mg Oral DAILY     LORazepam  1 mg Oral TID     prazosin  1 mg Oral QHS       PRN Meds:  acetaminophen, aluminum-magnesium hydroxide-simethicone, baclofen, hydrALAZINE, melatonin, polyethylene glycol    Medication adherence: Reviewed risk/benefits of medication , Patient able to verbalize understanding of side effects  and Patient verbally consents to taking medications  Medication side effects: none  Benefit from medications: minimal       ROS   Pertinent items are noted in HPI.         MENTAL STATUS EXAM   Vitals: /86 (Patient Position: Standing)   Pulse (!) 103   Temp 98.3  F (36.8  C) (Oral)   Resp 18   Ht 5' 9\" (1.753 m)   Wt 202 lb 9 oz (91.9 kg)   SpO2 97%   BMI 29.91 kg/m        Level of Consciousness:  alert and oriented  Appearance:  Disheveled, better eye contact  Attitude:  cooperative and polite  Speech:  speech is normal in rate, rhythm and volume  Language ability: intact  Mood:  \" I am better\"  Affect: Less anxious  Suicidal Ideation: " absent  Homicidal Ideation: absent  Thought formation: concrete  Thought content: Less obsessed about the medications  Fundamentals of Knowledge: Sufficient  Attention/Concentration: okay  Memory: intact.   Insight and Judgement: impaired judgment and impaired insight  Orientation: person, place, time and situation  Psychomotor Behavior: WNL  Muscle Strength and Tone: normal  Gait and Station: intact       LABS   personally reviewed.   Recent Results (from the past 48 hour(s))   SARS-CoV-2 (COVID-19)-PCR    Specimen: Respiratory   Result Value Ref Range    SARS-CoV-2 PCR Result Negative Negative, Invalid     No results found for: PHENYTOIN, PHENOBARB, VALPROATE, CBMZ       IMAGING     No new Imaging       DIAGNOSIS   Principal Problem:    OCD (obsessive compulsive disorder)    Active Problem List:  Patient Active Problem List   Diagnosis     OCD (obsessive compulsive disorder)     Autism spectrum disorder     Panic disorder with agoraphobia     PTSD (post-traumatic stress disorder)     Generalized anxiety disorder     Major depressive disorder, recurrent episode, moderate (H)     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 Ongoing education given regarding diagnostic and treatment options with risks, benefits and alternatives and adequate verbalization of understanding.  2. Continue medication management given beneficial response.   3.  to follow in regards to collecting and reviewing collateral information, referrals and disposition planning.    4.  Psychiatric Plan Of Care:        Clomipramine 25 mg daily and 50 mg at bedtime       Pristiq 50 mg daily       Ativan 1 mg 3 times a day       Prazosin 1 mg at bedtime       Baclofen 2.5 mg 3 times a day as needed for anxiety  5.  Substance Use Plan Of Care:        N/A    6.  Medical Plan Of Care:       Labs and EKG have been ordered  7.  Legal Plan of Care       Legal Status: voluntary           8.  Risk Assessment & Milieu Plan of  Care:       Risk Assessment: Patient on precautions       Encourage the patient to participate in unit activities.       Further treatment programming to be determined throughout the hospital course.    Coordination of Care:   Treatment Plan reviewed and physician signed, Care discussed with Care/Treatment Team Members, Chart reviewed, Patient seen and Care reviewed with family/caretakers    Re-Certification I certify that the inpatient psychiatric facility services furnished since the previous certification were, and continue to be, medically necessary for, either, treatment which could reasonably be expected to improve the patient s condition or diagnostic study and that the hospital records indicate that the services furnished were, either, intensive treatment services, admission and related services necessary for diagnostic study, or equivalent services.     I certify that the patient continues to need, on a daily basis, active treatment furnished directly by or requiring the supervision of inpatient psychiatric facility personnel.   I estimate 3-7 more days of hospitalization is necessary for proper treatment of the patient. My plans for post-hospital care for this patient are [facility] Care List: Home with follow-up     Amber Tong     -     7/12/2021     -2:30 PM    Total time 35 minutes with > 50% spent on coordination of cares and psycho-education.    This note was created with help of Dragon dictation system. Grammatical / typing errors are not intentional.    Amber Tyson MD

## 2021-07-14 NOTE — PROGRESS NOTES
"   07/14/21 1415   Engagement   Intervention Group   Topic Detail OT: Education regarding social wellness and creative hands on endeavor (card making) to increase social wellness, coping with stress, communicating with support systems, creative expression, problem solving, frustration tolerance, planning, time management, healthy leisure engagement, and social engagement   Attendance Attended   Patient Response Prosocial behavior;Was dismissive   Concentrated on Task duration of group   Cognition Messy work space;Goal-directed;Preoccupied   Mood/Affect Anxious;Poor frustration tolerance;Pleasant;Content;Irritable   Social/Behavioral Cooperative;Engaged;Dysregulated;Interrupts speaker   Thought Content [place];Lacks insight/judgement     Pt was pleasant with peers and appeared excited to crate a card for his \"mother\", who is part of his support system. Pt struggled with time management and perfectionist tendencies; pt started and discarded three projects. Pt struggled when therapist set boundary that pt would not be able to start another project and instead he would need to problem solve through one of his previous projects. Pt demonstrated poor frustration tolerance with boundary, as he became tearful, threw his projects on the ground, and left the room. Pt returned a few minutes later (still tearful), tore up his projects and threw them in the garbage. Therapist attempted to process with pt the importance of learning/practicing time management skills, frustration tolerance, and problem solving; pt dismissive about he importance of life skills of time management, frustration tolerance, and problem solving. Pt asked multiple times to start another project and therapist advised starting a fourth project (10 minutes after group ended) was not an option. Therapist advised pt compromise of him receiving a blank card with an envelope and using the pencils and markers on the unit to write his message and/or draw of " picture on his card; pt declined offer and left group area.       Pt then left group area after

## 2021-07-14 NOTE — PLAN OF CARE
Problem: Suicidal Behavior  Goal: Suicidal Behavior is Absent or Managed  Outcome: Improving  Flowsheets (Taken 7/14/2021 1150)  Mutually Determined Action Steps (Suicidal Behavior Absent/Managed): other (see comments)

## 2021-07-15 LAB
ATRIAL RATE - MUSE: 85 BPM
DEPRECATED CALCIDIOL+CALCIFEROL SERPL-MC: 16 UG/L (ref 30–80)
DIASTOLIC BLOOD PRESSURE - MUSE: NORMAL MMHG
INTERPRETATION ECG - MUSE: NORMAL
P AXIS - MUSE: 48 DEGREES
PR INTERVAL - MUSE: 146 MS
QRS DURATION - MUSE: 94 MS
QT - MUSE: 366 MS
QTC - MUSE: 435 MS
R AXIS - MUSE: 19 DEGREES
SYSTOLIC BLOOD PRESSURE - MUSE: NORMAL MMHG
T AXIS - MUSE: 40 DEGREES
VENTRICULAR RATE- MUSE: 85 BPM

## 2021-07-15 PROCEDURE — 128N000001 HC R&B CD/MH ADULT

## 2021-07-15 PROCEDURE — 99233 SBSQ HOSP IP/OBS HIGH 50: CPT | Performed by: PSYCHIATRY & NEUROLOGY

## 2021-07-15 PROCEDURE — 250N000013 HC RX MED GY IP 250 OP 250 PS 637: Performed by: PSYCHIATRY & NEUROLOGY

## 2021-07-15 PROCEDURE — 250N000013 HC RX MED GY IP 250 OP 250 PS 637

## 2021-07-15 RX ORDER — VITAMIN B COMPLEX
50 TABLET ORAL DAILY
Status: DISCONTINUED | OUTPATIENT
Start: 2021-07-15 | End: 2021-07-20 | Stop reason: HOSPADM

## 2021-07-15 RX ORDER — CLOMIPRAMINE HYDROCHLORIDE 50 MG/1
50 CAPSULE ORAL DAILY
Status: DISCONTINUED | OUTPATIENT
Start: 2021-07-16 | End: 2021-07-20 | Stop reason: HOSPADM

## 2021-07-15 RX ADMIN — PRAZOSIN HYDROCHLORIDE 1 MG: 1 CAPSULE ORAL at 20:45

## 2021-07-15 RX ADMIN — Medication 15 MG: at 20:45

## 2021-07-15 RX ADMIN — Medication 50 MCG: at 13:36

## 2021-07-15 RX ADMIN — DESVENLAFAXINE 50 MG: 50 TABLET, FILM COATED, EXTENDED RELEASE ORAL at 07:56

## 2021-07-15 RX ADMIN — LORAZEPAM 1 MG: 0.5 TABLET ORAL at 07:57

## 2021-07-15 RX ADMIN — CLOMIPRAMINE HCL 50 MG: 50 CAPSULE ORAL at 20:45

## 2021-07-15 RX ADMIN — LORAZEPAM 1 MG: 0.5 TABLET ORAL at 13:36

## 2021-07-15 RX ADMIN — ACETAMINOPHEN 650 MG: 325 TABLET ORAL at 07:55

## 2021-07-15 RX ADMIN — CLOMIPRAMINE HYDROCHLORIDE 25 MG: 25 CAPSULE ORAL at 07:56

## 2021-07-15 RX ADMIN — Medication 2.5 MG: at 05:57

## 2021-07-15 RX ADMIN — LORAZEPAM 1 MG: 0.5 TABLET ORAL at 20:45

## 2021-07-15 ASSESSMENT — ACTIVITIES OF DAILY LIVING (ADL)
HYGIENE/GROOMING: INDEPENDENT
DRESS: INDEPENDENT
ORAL_HYGIENE: INDEPENDENT
LAUNDRY: WITH SUPERVISION

## 2021-07-15 NOTE — PROGRESS NOTES
07/15/21 1541   Engagement   Intervention Group   Topic Detail OT Creative Expressions Group-[employer] for frustration tolerance, creativity, fine motor/coordination, social engagement, focus, following directions, and symptom management   Attendance Did not attend   Reason for Not Attending   (pt in bed on approach-made eye contact with therapist-no verbal response)

## 2021-07-15 NOTE — PROGRESS NOTES
"Assessment/Intervention/Current Symtoms and Care Coordination  Writer and patient met to discuss discharge plans. Writer and patient met three times throughout the day. Writer and patient met to process patient feelings prior to meeting with [facility] agency. Patient processed his morning. Patient is aware that mornings are harder for him and was able to recall morning stress and methods of coping. Patient wanted to rest and writer returned for meet-n-greet. Writer attended meet and greet at beginning and end of meeting. Patient stated that he had felt that the meeting had gone well. The new agency would like to schedule a tele-visit with patient's staff on Monday prior to discharge so patient can meet his team. Writer and new agency agreed to make arrangements for potential tele-visit on Monday.       Tentative discharge Tuesday morning at 11 am. Patient does not feel comfortable to discharge on a weekend due to staffing changes at his home.    Writer OT psychiatrist had a care conference with Tom Gallo. Mom was concerned about time limitations in group. Psychiatrist update mother with medications changes, OT explained group timings and writer spoke about discharge planning. Writer will speak to patient daily regarding upcoming discharge. Mom wants agreement for patient to allow her to go clean his home prior to discharge and/or for patient to go shopping with her or have her purchase some items. Writer has developed a daily plan to review with patient over the weekend.for staff. Writer will consult with patient tomorrow regarding plan.  Patient's preparing for discharge plan is below to be reviewed with patient daily prior to discharge.    Self Affirmation for self compassion/self esteem    \"I am willing to pause for a fresh (positive) new thought, one that serves me better.\"    1.) Remind patient that he is preparing to discharge on Tuesday.    2.) Ask patient about having family prepare his home    3.) Ask patient " about shopping with mom at discharge    Writer updated [facility] CM and New agency with tentative discharge plans for Tuesday at 11 am.       Discharge Plan or Goal Return to customized living, with [facility]    Barriers to Discharge Symptom Stabilization, Medication Management, Care Coordination    Referral Status-Pending     [facility] Ceja   [place] [phone]  CAD[facility]  Marine Sweet:  [phone]   Mom Cleo: [phone]    Legal Status Voluntary      Christy Braden MA [facility] CCW, 7/13/2021, 2:13 PM

## 2021-07-15 NOTE — PLAN OF CARE
BEHAVIORAL TEAM DISCUSSION    Participants:   -Amber Kim MD  -Denisse WARE RN  -Jeanne ESCOBAR  -Jeanne KAPLAN OT  -Georgie LEIGH PharmD  Progress: Improving  Anticipated length of stay: Friday 07/16/21  Continued Stay Criteria/Rationale: Symptom stabilization Medication Management Care Coordination  Medical/Physical:   Autism Spectrum Disorder  Precautions:  Did not carry over from nursing note  Plan: Behavorial Plan  Rationale for change in precautions or plan: No changes

## 2021-07-15 NOTE — PLAN OF CARE
"  Problem: Behavioral Health Plan of Care  Goal: Optimized Coping Skills in Response to Life Stressors  Outcome: Improving     Problem: Suicidal Behavior  Goal: Suicidal Behavior is Absent or Managed  Outcome: Improving   Pt was yelling out and crying in am, see note.  Pt behavior and mood improved after writer visited with pt and gave am meds.  Pt rated anxiety at 7/10.  When asked about depression pt stated \"I don't know, I feel empty, I feel mute\".  Pt denied SI/HI and hallucinations.  Pt reports he \"woke up not feeling well\" but cannot articulate what does not feel well.  Pt c/o HA of 4/10, PRN APAP given, decreased to 2/10.  Pt had meeting today onsite with  and supportive living team.    "

## 2021-07-15 NOTE — PROGRESS NOTES
"  PSYCHIATRY  PROGRESS NOTE     DATE OF SERVICE   7/15/2021       CHIEF COMPLAINT   \" I am doing better now.\"       SUBJECTIVE   Nursing reports:   Phone call received from pts mom who is wanting to discuss pts behavior this am.  Informed mother that [place] was upset this am and is feeling much better.  Writer provided active listening and encouraged relaxation and deep breathing which appeared to be helpful.  Pt told writer he does not want to discuss what happened yesterday with his mother because today is a new day.  Pt reports his mother did not accept his request and wanted to continue talking about yesterday which he reports was upsetting.      Mother is requesting a call from OT staff and MD as she believes \"something is happening in OT\" that is triggering pt and making him upset.  She reports he does not do well with activities that are on a timeline d/t dx of autism.  Mother states it is possible that pt is comparing his work to other pt which may be upsetting him? Mother reports pt wants to get rid of all his belongings in his town home as a result.     Patient has been reviewed in detail with the  earlier this morning. We have plan for a care conference at noon today with , OT staff and this writer.       OBJECTIVE   I have met with the patient earlier today at bedside, this was a face-to-face evaluation. Upon patient interview, patient reports that he is feeling better now. He was able to talk about what made him upset this morning in he understands that the changes in the unit can make his anxiety worse. We talked about the importance of following the behavioral plan in utilizing coping skills when he is feeling very anxious. Patient informed to me that he has been working on this and it has been helpful.    When talking about his medications patient tells me that he would like to increase the clomipramine back to 50 mg 2 times a day as he feels this medication will work " better in combination with the Pristiq. We also talk about the results of his blood work and informed him that I was going to put him on a vitamin D supplement. Patient has verbalized good understanding and he is in agreement with the treatment and discharge plans. He is also looking forward to meet with the new providers.    Today we had a care conference with patient's parents, , OT staff and this writer. Mother verbalized serious concerns about the Occupational Therapy groups as she feels that these groups are not helpful for the patient. Occupational therapist discussed with the mother the reasoning behind doing the Occupational Therapy groups and how they have been trying to work with the patient in managing his anxiety. Mother also tells me that the patient is thinking about throwing everything away at home because he does not think that these things are good enough. I explained to the mother that I did not wanted to bring this topic today as I do not want to increase patient's anxiety or overwhelmed him. We informed the mother that overall patient has been improving and every day he seemed to be better. Patient has been more able to use his coping skills and yes he still has episodes of anxiety but he is more able to manage them. Mother again and pointed out that the patient seemed to be more upset in the morning and at the same time she tells us that this has been going on for a year.  explained to the mother that the patient has informed us that he is not a morning person and he takes him time to adapt to a new day.    In the end we discussed with the mother that we have a behavioral plan in place and this is going to be discussed with the new providers. We also can send her a copy of the behavioral plan. We informed the mother of the things that have been helpful here for the patient and encouraged her to continue following the behavioral plan at home.       MEDICATIONS  "  Medications:  Scheduled Meds:    [START ON 7/16/2021] clomiPRAMINE  50 mg Oral Daily     clomiPRAMINE  50 mg Oral At Bedtime     desvenlafaxine succinate  50 mg Oral Daily     LORazepam  1 mg Oral TID     prazosin  1 mg Oral At Bedtime     cholecalciferol  50 mcg Oral Daily     Continuous Infusions:  PRN Meds:.acetaminophen, alum & mag hydroxide-simethicone, Baclofen, hydrALAZINE, melatonin, polyethylene glycol    Medication adherence issues: MS Med Adherence Y/N: Yes, Hospitalization  Medication side effects: MEDICATION SIDE EFFECTS: no side effects reported  Benefit: Yes / No: Yes       ROS   A comprehensive review of systems was negative.       MENTAL STATUS EXAM   Vitals: /65   Pulse 106   Temp 98.2  F (36.8  C) (Oral)   Resp 17   Ht 1.753 m (5' 9\")   Wt 92.1 kg (203 lb 0.7 oz)   SpO2 95%   BMI 29.98 kg/m      Appearance:  No apparent distress  Mood:  {Mood: \"Feeling better now\"  Affect: Stable  was congruent to speech  Suicidal Ideation: PRESENT / ABSENT: absent   Homicidal Ideation: PRESENT / ABSENT: absent   Thought process: unremarkable   Thought content: Patient did not seem to be to obsess today or perseverating on his medications  Fund of Knowledge: Average  Attention/Concentration: Fair  Language ability:  Intact  Memory:  Immediate recall intact  Insight:  fair.  Judgement: fair  Orientation: Yes, x4  Psychomotor Behavior: normal or unremarkable    Muscle Strength and Tone: MuscleStrength: Normal  Gait and Station: Normal       LABS   personally reviewed.     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 Ongoing education given regarding diagnostic and treatment options with risks, benefits and alternatives and adequate verbalization of understanding.  2. Medications      Increase clomipramine to 50 mg twice daily       Pristiq 50 mg daily      Prazosin 1 mg at bedtime      Ativan 1 mg 3 times a day      Start vitamin D 50 mcg daily  3. Hospitalist to follow-up as needed  4.  working on a safe discharge plan.  5. Psychology is following the patient.    Risk Assessment: [facility] RISK ASSESSMENT: Patient able to contract for safety    Coordination of Care:   Treatment Plan reviewed and physician signed, Care discussed with Care/Treatment Team Members, Chart reviewed and Patient seen      Re-Certification I certify that the inpatient psychiatric facility services furnished since the previous certification were, and continue to be, medically necessary for, either, treatment which could reasonably be expected to improve the patient s condition or diagnostic study and that the hospital records indicate that the services furnished were, either, intensive treatment services, admission and related services necessary for diagnostic study, or equivalent services.     I certify that the patient continues to need, on a daily basis, active treatment furnished directly by or requiring the supervision of inpatient psychiatric facility personnel.   I estimate 7 days of hospitalization is necessary for proper treatment of the patient. My plans for post-hospital care for this patient are  Home with home care services     Amber Tyson MD    -     7/15/2021     -     1:06 PM    Total time  >60 minutes with > 50%spent on coordination of cares and psycho-education.    This note was created with help of Dragon dictation system. Grammatical / typing errors are not intentional.    Amber  MD [place]

## 2021-07-15 NOTE — PROGRESS NOTES
7/15/21  1:00PM      Intervention Group   Topic Skills   Topic Detail Stress Management   Attendance Declined to attend                 Documented by Lex Mckeon MA, Psychology Intern  Supervised by Andra Ibarra Psy.D., LP

## 2021-07-15 NOTE — PLAN OF CARE
Problem: Behavioral Health Plan of Care  Goal: Plan of Care Review  Outcome: No Change  Flowsheets (Taken 7/15/2021 1640)  Plan of Care Reviewed With: patient  Patient Agreement with Plan of Care: agrees     Problem: Behavioral Health Plan of Care  Goal: Adheres to Safety Considerations for Self and Others  Outcome: Improving  Goal: Absence of New-Onset Illness or Injury  Outcome: Improving  Goal: Optimized Coping Skills in Response to Life Stressors  Outcome: Improving     Problem: Suicidal Behavior  Goal: Suicidal Behavior is Absent or Managed  Outcome: Improving    Patient denies suicidal and homicidal ideation and contracts for safety. He rated anxiety at 3/10 and depression 4/10. He spent the majority of time this shift isolative to room but was social with one of his peers while out in the milieu. He made phone calls to his mom at least twice and did not seem upset afterwards.

## 2021-07-15 NOTE — PLAN OF CARE
Patient slept for about 5 hours. Safety checks were completed every 15 minutes. No pain was reported, patient endorsed anxiety rated 5/10 and was given baclofen 2.5 mg. Denied depression and self injurious behavior. We will continue to monitor.

## 2021-07-15 NOTE — PROGRESS NOTES
"Phone call received from pts mom who is wanting to discuss pts behavior this am.  Informed mother that [place] was upset this am and is feeling much better.  Writer provided active listening and encouraged relaxation and deep breathing which appeared to be helpful.  Pt told writer he does not want to discuss what happened yesterday with his mother because today is a new day.  Pt reports his mother did not accept his request and wanted to continue talking about yesterday which he reports was upsetting.    Mother is requesting a call from OT staff and MD as she believes \"something is happening in OT\" that is triggering pt and making him upset.  She reports he does not do well with activities that are on a timeline d/t dx of autism.  Mother states it is possible that pt is comparing his work to other pt which may be upsetting him? Mother reports pt wants to get rid of all his belongings in his town home as a result.     "

## 2021-07-15 NOTE — PROGRESS NOTES
07/15/21 1353   Engagement   Intervention Group   Topic Detail OT [employer]ies-[employer] for social engagement, healthy distraction, focus, direction following, cognitive wellness, and symptom management   Attendance Did not attend   Reason for Not Attending   (in lounge on approach-did not respond to invite)

## 2021-07-16 PROCEDURE — 128N000001 HC R&B CD/MH ADULT

## 2021-07-16 PROCEDURE — 99233 SBSQ HOSP IP/OBS HIGH 50: CPT | Performed by: PSYCHIATRY & NEUROLOGY

## 2021-07-16 PROCEDURE — 250N000013 HC RX MED GY IP 250 OP 250 PS 637

## 2021-07-16 PROCEDURE — 250N000013 HC RX MED GY IP 250 OP 250 PS 637: Performed by: PSYCHIATRY & NEUROLOGY

## 2021-07-16 PROCEDURE — 90832 PSYTX W PT 30 MINUTES: CPT | Performed by: PSYCHOLOGIST

## 2021-07-16 RX ADMIN — CLOMIPRAMINE HCL 50 MG: 50 CAPSULE ORAL at 09:01

## 2021-07-16 RX ADMIN — PRAZOSIN HYDROCHLORIDE 1 MG: 1 CAPSULE ORAL at 21:15

## 2021-07-16 RX ADMIN — Medication 2.5 MG: at 05:53

## 2021-07-16 RX ADMIN — ACETAMINOPHEN 650 MG: 325 TABLET ORAL at 06:45

## 2021-07-16 RX ADMIN — CLOMIPRAMINE HCL 50 MG: 50 CAPSULE ORAL at 21:15

## 2021-07-16 RX ADMIN — LORAZEPAM 1 MG: 0.5 TABLET ORAL at 09:01

## 2021-07-16 RX ADMIN — Medication 15 MG: at 21:15

## 2021-07-16 RX ADMIN — DESVENLAFAXINE 50 MG: 50 TABLET, FILM COATED, EXTENDED RELEASE ORAL at 09:01

## 2021-07-16 RX ADMIN — LORAZEPAM 1 MG: 0.5 TABLET ORAL at 21:15

## 2021-07-16 RX ADMIN — LORAZEPAM 1 MG: 0.5 TABLET ORAL at 14:10

## 2021-07-16 RX ADMIN — Medication 50 MCG: at 09:06

## 2021-07-16 RX ADMIN — Medication 2.5 MG: at 10:27

## 2021-07-16 ASSESSMENT — ACTIVITIES OF DAILY LIVING (ADL)
HYGIENE/GROOMING: INDEPENDENT
DRESS: INDEPENDENT
ORAL_HYGIENE: INDEPENDENT
ORAL_HYGIENE: INDEPENDENT
DRESS: INDEPENDENT
HYGIENE/GROOMING: INDEPENDENT
LAUNDRY: WITH SUPERVISION
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
## 2021-07-16 NOTE — PLAN OF CARE
"  Problem: Behavioral Health Plan of Care  Goal: Adheres to Safety Considerations for Self and Others  Outcome: No Change     Problem: Suicidal Behavior  Goal: Suicidal Behavior is Absent or Managed  Outcome: No Change     Problem: Pain Acute  Goal: Acceptable Pain Control and Functional Ability  Description: Denied pain.   Outcome: No Change    Patient has remained free from harm and has been rounded on every 15 minutes per policy. Patient has demonstrated non labored  breathing throughout the night. Patient has not verbalized any pain. Patient voiced no SI/HI. Patient received PRN baclofen for anxiety. Noted to be irritable that he could not sleep and was kimo with writer when told what time it was. Patient stated \"what does that have to do with anything?\" Patient upset that writer helped another patient before assisting with his need though writer was already in the middle of assisting the other patient. Patient encouraged to use deep breathing for anxiety and to help induce sleep but was uninterested. APAP provided for pain rating 4/10. Patient slept greater than 6 hours thus far per observation.        "

## 2021-07-16 NOTE — PROGRESS NOTES
07/16/21 1545   Engagement   Intervention Group   Topic Detail OT Creative Expression group-finish copper tooling, frame painting and healthy distraction education for social engagement, creativity, symptom management, direction following and healthy distraction   Attendance Did not attend   Reason for Not Attending Refused  (declined personal invite)

## 2021-07-16 NOTE — PLAN OF CARE
Pt cooperative, med complaint. Pt has had re occurences episodes of outburst with tears. Pt got better as day progressive. Denies pain, SI, HI, SIB, hallucinations. Contracted for safety. Rated depression at 4, anxiety at 8. Pt out in milieu and engages with selected peers and refused to attend groups.
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
## 2021-07-17 PROCEDURE — 128N000001 HC R&B CD/MH ADULT

## 2021-07-17 PROCEDURE — 250N000013 HC RX MED GY IP 250 OP 250 PS 637: Performed by: PSYCHIATRY & NEUROLOGY

## 2021-07-17 PROCEDURE — 250N000013 HC RX MED GY IP 250 OP 250 PS 637

## 2021-07-17 RX ADMIN — LORAZEPAM 1 MG: 0.5 TABLET ORAL at 20:25

## 2021-07-17 RX ADMIN — Medication 2.5 MG: at 17:18

## 2021-07-17 RX ADMIN — ACETAMINOPHEN 650 MG: 325 TABLET ORAL at 06:56

## 2021-07-17 RX ADMIN — Medication 50 MCG: at 08:39

## 2021-07-17 RX ADMIN — CLOMIPRAMINE HCL 50 MG: 50 CAPSULE ORAL at 08:39

## 2021-07-17 RX ADMIN — LORAZEPAM 1 MG: 0.5 TABLET ORAL at 13:21

## 2021-07-17 RX ADMIN — PRAZOSIN HYDROCHLORIDE 1 MG: 1 CAPSULE ORAL at 20:25

## 2021-07-17 RX ADMIN — LORAZEPAM 1 MG: 0.5 TABLET ORAL at 08:39

## 2021-07-17 RX ADMIN — Medication 15 MG: at 20:26

## 2021-07-17 RX ADMIN — DESVENLAFAXINE 50 MG: 50 TABLET, FILM COATED, EXTENDED RELEASE ORAL at 08:39

## 2021-07-17 RX ADMIN — Medication 2.5 MG: at 10:15

## 2021-07-17 RX ADMIN — CLOMIPRAMINE HCL 50 MG: 50 CAPSULE ORAL at 20:25

## 2021-07-17 ASSESSMENT — ACTIVITIES OF DAILY LIVING (ADL)
ORAL_HYGIENE: INDEPENDENT
DRESS: INDEPENDENT
HYGIENE/GROOMING: INDEPENDENT

## 2021-07-17 NOTE — PLAN OF CARE
Problem: Behavioral Health Plan of Care  Goal: Plan of Care Review  Outcome: Improving  Flowsheets  Taken 7/17/2021 0940  Patient Agreement with Plan of Care: agrees  Taken 7/17/2021 0841  Plan of Care Reviewed With: patient  Patient Agreement with Plan of Care: agrees  Goal: Adheres to Safety Considerations for Self and Others  Outcome: Improving  Goal: Absence of New-Onset Illness or Injury  Outcome: Improving  Goal: Optimized Coping Skills in Response to Life Stressors  Outcome: Improving     Problem: Suicidal Behavior  Goal: Suicidal Behavior is Absent or Managed  Outcome: Improving     Patient denies suicidal and homicidal ideation and contracts for safety. He denies visual and auditory hallucinations and does not seem to be responding to internal stimuli. He rated anxiety at 6 and depression 4. He was isolative to room for the most part this shift. He had one brief episode of crying this morning but has been calm since.

## 2021-07-17 NOTE — PLAN OF CARE
"  Problem: Behavioral Health Plan of Care  Goal: Plan of Care Review  Outcome: Improving  Flowsheets  Taken 7/16/2021 2306  Plan of Care Reviewed With: patient  Patient Agreement with Plan of Care: agrees  Taken 7/16/2021 1654  Plan of Care Reviewed With: patient  Patient Agreement with Plan of Care: agrees  Consent Given to Review Plan with: Mother  Goal: Adheres to Safety Considerations for Self and Others  Outcome: Improving  Goal: Absence of New-Onset Illness or Injury  Outcome: Improving  Goal: Optimized Coping Skills in Response to Life Stressors  Outcome: Improving  Goal: Develops/Participates in Therapeutic [place] to Support Successful Transition  Outcome: Improving  Intervention: Foster Therapeutic [place]  Recent Flowsheet Documentation  Taken 7/16/2021 1654 by Getachew Fernandes RN  Trust Relationship/Rapport: emotional support provided   Pt rates anxiety at 4/10, depression 1/10, but denies the remainder of psych symptoms and contracts for safety. Pt had two cry episodes because another pt is being discharged. Offered emotional support and suggested an antianxiety, but pt declined stating that \"it will not help with sadness\". No more cry episodes noted after other pt was discharged.            "

## 2021-07-17 NOTE — PROGRESS NOTES
07/17/21 1100   Engagement   Intervention Group   Topic Detail Exercise dice and Social question dice for large motor morvement, coping, sharing thoughts/feelings, symptom management, expanding social skills, improving mood, healthy leisure and encouraging a meaningful daily routine   Attendance Did not attend

## 2021-07-17 NOTE — PLAN OF CARE
Patient slept most part of the shift, without any visible distress. Safety checks were done every 15 minutes. No pain was reported and no behaviors were exhibited. He denied anxiety, depression and hallucination. No PRN was given. We will continue to monitor.

## 2021-07-18 PROCEDURE — 250N000013 HC RX MED GY IP 250 OP 250 PS 637: Performed by: PSYCHIATRY & NEUROLOGY

## 2021-07-18 PROCEDURE — 250N000013 HC RX MED GY IP 250 OP 250 PS 637

## 2021-07-18 PROCEDURE — 128N000001 HC R&B CD/MH ADULT

## 2021-07-18 RX ADMIN — DESVENLAFAXINE 50 MG: 50 TABLET, FILM COATED, EXTENDED RELEASE ORAL at 08:04

## 2021-07-18 RX ADMIN — LORAZEPAM 1 MG: 0.5 TABLET ORAL at 13:01

## 2021-07-18 RX ADMIN — LORAZEPAM 1 MG: 0.5 TABLET ORAL at 20:19

## 2021-07-18 RX ADMIN — LORAZEPAM 1 MG: 0.5 TABLET ORAL at 08:04

## 2021-07-18 RX ADMIN — Medication 15 MG: at 20:19

## 2021-07-18 RX ADMIN — CLOMIPRAMINE HCL 50 MG: 50 CAPSULE ORAL at 20:18

## 2021-07-18 RX ADMIN — Medication 50 MCG: at 08:04

## 2021-07-18 RX ADMIN — PRAZOSIN HYDROCHLORIDE 1 MG: 1 CAPSULE ORAL at 20:18

## 2021-07-18 RX ADMIN — Medication 2.5 MG: at 06:28

## 2021-07-18 RX ADMIN — Medication 2.5 MG: at 16:28

## 2021-07-18 RX ADMIN — CLOMIPRAMINE HCL 50 MG: 50 CAPSULE ORAL at 08:04

## 2021-07-18 ASSESSMENT — ACTIVITIES OF DAILY LIVING (ADL)
ORAL_HYGIENE: INDEPENDENT;PROMPTS
HYGIENE/GROOMING: HANDWASHING;PROMPTS
HYGIENE/GROOMING: INDEPENDENT
DRESS: INDEPENDENT
ORAL_HYGIENE: INDEPENDENT
DRESS: SCRUBS (BEHAVIORAL HEALTH)

## 2021-07-18 NOTE — PLAN OF CARE
Problem: Behavioral Health Plan of Care  Goal: Plan of Care Review  Flowsheets  Taken 7/18/2021 1718 by Ortega Amezcua, RN  Plan of Care Reviewed With: patient  Progress: improving  Taken 7/18/2021 1000 by Kely Morrell, RN  Patient Agreement with Plan of Care: agrees     Patient has been med compliant and rates depression 5/10, anxiety 4/10 and is able to contract for safety.

## 2021-07-18 NOTE — PLAN OF CARE
Problem: Behavioral Health Plan of Care  Goal: Plan of Care Review  Outcome: No Change  Flowsheets (Taken 7/17/2021 1623)  Plan of Care Reviewed With: patient  Patient Agreement with Plan of Care: agrees     Problem: Behavioral Health Plan of Care  Goal: Develops/Participates in Therapeutic [place] to Support Successful Transition  Intervention: Foster Therapeutic [place]  Recent Flowsheet Documentation  Taken 7/17/2021 1623 by Tata Leigh RN  Trust Relationship/Rapport:   emotional support provided   empathic listening provided

## 2021-07-18 NOTE — PLAN OF CARE
"  Problem: Behavioral Health Plan of Care  Goal: Plan of Care Review  Outcome: No Change  Flowsheets (Taken 7/18/2021 1000)  Plan of Care Reviewed With: patient  Patient Agreement with Plan of Care: agrees     Problem: Suicidal Behavior  Goal: Suicidal Behavior is Absent or Managed  Outcome: No Change     Patient alert and cooperative; flat affect on approach. Pt tearful in am, crying and yelling in his room, \"I just want to go to sleep\". Anxiety 4/10 and depression 5/10; denies SI/SIB, HI, AH/VH; contracts for safety. Denies pain. Pt currently in room listening to his noise machine. Will continue to monitor.  Kely Morrell RN   "

## 2021-07-18 NOTE — PLAN OF CARE
Patient was calm and slept all night without any distress, safety checks were conducted every 15 minutes. No pain was reported and patient denied all psych symptoms, except for anxiety and was given Baclofen 2.5 mg. We will continue to monitor.

## 2021-07-18 NOTE — PROGRESS NOTES
Pt rates anxiety at 7/10, depression 5/10, but denies all other psych symptoms and contracts for safety. Received PRN baclofen for anxiety. One episode of brief crying this evening regarding a patient that was discharged yesterday then calm for the rest of the day. Pt remain Isolative to room but out for meals and snacks. Received melatonin for sleep aid. At this time, pt sleeping in room, Safety checks in place.

## 2021-07-19 PROCEDURE — 99232 SBSQ HOSP IP/OBS MODERATE 35: CPT | Performed by: PSYCHIATRY & NEUROLOGY

## 2021-07-19 PROCEDURE — 250N000013 HC RX MED GY IP 250 OP 250 PS 637: Performed by: PSYCHIATRY & NEUROLOGY

## 2021-07-19 PROCEDURE — 250N000013 HC RX MED GY IP 250 OP 250 PS 637

## 2021-07-19 PROCEDURE — 128N000001 HC R&B CD/MH ADULT

## 2021-07-19 RX ADMIN — LORAZEPAM 1 MG: 0.5 TABLET ORAL at 08:23

## 2021-07-19 RX ADMIN — DESVENLAFAXINE 50 MG: 50 TABLET, FILM COATED, EXTENDED RELEASE ORAL at 08:23

## 2021-07-19 RX ADMIN — Medication 50 MCG: at 08:24

## 2021-07-19 RX ADMIN — Medication 2.5 MG: at 05:30

## 2021-07-19 RX ADMIN — ACETAMINOPHEN 650 MG: 325 TABLET ORAL at 08:23

## 2021-07-19 RX ADMIN — CLOMIPRAMINE HCL 50 MG: 50 CAPSULE ORAL at 08:23

## 2021-07-19 RX ADMIN — Medication 15 MG: at 20:58

## 2021-07-19 RX ADMIN — LORAZEPAM 1 MG: 0.5 TABLET ORAL at 20:58

## 2021-07-19 RX ADMIN — LORAZEPAM 1 MG: 0.5 TABLET ORAL at 13:44

## 2021-07-19 RX ADMIN — Medication 2.5 MG: at 13:01

## 2021-07-19 RX ADMIN — PRAZOSIN HYDROCHLORIDE 1 MG: 1 CAPSULE ORAL at 21:00

## 2021-07-19 RX ADMIN — CLOMIPRAMINE HCL 50 MG: 50 CAPSULE ORAL at 20:57

## 2021-07-19 NOTE — PLAN OF CARE
Patient was isolative to himself, intermittently out in the milieu making phone calls to his mom.  Patient was upset and crying majority of the shift.  Patient was yelling about not understanding what was going on with his discharge planning.  Patient became very upset when he did not know what time he was meeting with his workers from the facility he will be discharging to on Tuesday 7/20.  Multiple staff approached patient, he was not easily redirectable.  Patient continued to perservate on the same issues that were discussed previously, patient did not want to accept any explanations to clarify his issues.  Patient requested and received PRN Baclofen without effectiveness.  Problem: Behavioral Health Plan of Care  Goal: Adheres to Safety Considerations for Self and Others  Outcome: No Change  Goal: Absence of New-Onset Illness or Injury  Outcome: No Change  Goal: Optimized Coping Skills in Response to Life Stressors  Outcome: No Change

## 2021-07-19 NOTE — PLAN OF CARE
Patient slept more than 6 hours and was neither violent nor aggressive. Safety checks were conducted every 15 minutes to ensure patient's safety. No pain was reported, patient endorsed anxiety, and was given baclofen 2.5 mg PRN. He denied depression and hallucination. No scheduled medication was due at this time. We will continue to observe this patient.

## 2021-07-19 NOTE — PROGRESS NOTES
Assessment/Intervention/Current Symtoms and Care Coordination  Writer met with patient to discuss discharge plans. Patient and family have agreed to discharge at 1 pm on 07/20. Patient belonging list has been checked. Nursing validated that pharmacy accepts silver scripts. Patient discharge including  travel plans and shopping have been reviewed in detail. Patient remains in agreement.    Patient had a virtual meet and greet scheduled with [facility]. Writer provided connection information. Not all [facility] staff was able to access internet services and on site technical difficulties delayed the meet n greet until 3pm. Patient was tearful and screaming in his room however was able to process his experience later in the day. Patient declined meet and greet as he was unable to see the faces of staff and expressed that phone was not effective. Writer called [facility] and explained that patient needs visual information so he can recognize staff when they come to his door. [facility] agreed to make arrangements with mother for a meet and greet post discharge.      Discharge Plan or Goal Return to customized living, with [facility]    Barriers to Discharge Symptom Stabilization, Medication Management, Care Coordination    Referral Status-Pending     [facility] Antonio Ceja   [facility]   CADI  Marine Palomaresidian:  [phone]   [facility] Cleo: [phone]    Legal Status Voluntary      Christy Braden MA [facility] CCW, 7/13/2021, 2:13 PM

## 2021-07-19 NOTE — PLAN OF CARE
Problem: Pain Acute  Goal: Acceptable Pain Control and Functional Ability  Description: Denied pain.   Outcome: Improving     Problem: Sleep Disturbance  Goal: Adequate Sleep/Rest  Outcome: Improving   Pt is medication compliant. Stayed in his room but came out for snack. No behavior noted.

## 2021-07-19 NOTE — PLAN OF CARE
Problem: Relapse Prevention  Goal: Engage in OT Group  Description: Target: Patient will engaged in 1 OT group activity daily that supports recovery this reporting period  Outcome: Declining   Goal review completed:     Patient has attended 1 out of 11 OT groups offered this reporting period. Pt was excused for 2 morning groups this reporting period due to SW or provider meetings. Met with patient to review OT goal progression. Pt shared he is usually not amenable to morning groups. Patient shared he enjoys more hands on activities not talking or movement groups. Pt shared he feels the weighted blanket may be too heavy and is not providing him with level of symptom relief he anticipated. Pt asked to keep the blanket until tomorrow just in care he chooses to try again. Care plan goals have been reviewed and are appropriate. Continue to establish rapport and encourage group participation with focus on goal area/s for further progress. Continue to correspond with interdisciplinary team regarding ongoing treatment plan, potential changes in patient's status, and discharge planning.    Therapist: Suzanna Kaur, OTR  7/19/2021

## 2021-07-19 NOTE — PROGRESS NOTES
07/19/21 1517   Engagement   Intervention Group   Topic Detail OT Creative Expressions group-quilt squares for creativity, social engagement, symptom management and healthy distraction   Attendance Did not attend   Reason for Not Attending   (pt in bathroom on initial invite)

## 2021-07-19 NOTE — PROGRESS NOTES
"  PSYCHIATRY  PROGRESS NOTE     DATE OF SERVICE   7/19 do not know what is going on/2021       CHIEF COMPLAINT   \" I do not know what is going on.\"       SUBJECTIVE   Nursing reports: Over the weekend the patient had a few episodes of crying that were short-lived and patient for the most part was able to use his coping skills.    Patient has been reviewed in detail with the .  As of now we continue to plan for the patient to go home tomorrow.       OBJECTIVE   Patient was seen and evaluated at bedside with nurse in charge of the patient present during the assessment, this was a face-to-face evaluation.  Upon patient interview patient reported that he is feeling extremely anxious and he is not able to verbalize his feelings as he was just told that that his meeting with his home care providers have been postponed.  Patient was extremely dysregulated at the time, screaming, crying and calling for his mother.  We tried to engage the patient but he continued to perseverate on the appointment that was canceled and he was wanting to have a higher dose of vitamin D.  I have tried to redirect the patient, nurse present also try to redirect the patient but this was not successful.  Patient was given Ativan 1 mg that it was schedule.    I met with the patient 15 minutes after he received the Ativan and he seemed to be doing much better than when I saw him earlier.  At that time I met with him and the  in the day area.   discussed with the patient that there was a problem with the computer reason why the home care providers were not able to meet with him on time.  We offered with the patient different solutions but he really wanted to meet with the home care providers using FaceTime as he wants to see how they looked.   informed the patient that she was going to try to solve the problem and she will update him soon.  Patient was able to understand the information provided to " "him.  After that patient was observed interacting with certain peers, he was more calm and redirectable.       MEDICATIONS   Medications:  Scheduled Meds:    clomiPRAMINE  50 mg Oral Daily     clomiPRAMINE  50 mg Oral At Bedtime     desvenlafaxine succinate  50 mg Oral Daily     LORazepam  1 mg Oral TID     prazosin  1 mg Oral At Bedtime     cholecalciferol  50 mcg Oral Daily     Continuous Infusions:  PRN Meds:.acetaminophen, alum & mag hydroxide-simethicone, Baclofen, hydrALAZINE, melatonin, polyethylene glycol    Medication adherence issues: MS Med Adherence Y/N: Yes, Hospitalization  Medication side effects: MEDICATION SIDE EFFECTS: no side effects reported  Benefit: Yes / No: Yes       ROS   A comprehensive review of systems was negative.       MENTAL STATUS EXAM   Vitals: /80   Pulse 102   Temp 97.7  F (36.5  C) (Oral)   Resp 16   Ht 1.753 m (5' 9\")   Wt 92.1 kg (203 lb 0.7 oz)   SpO2 98%   BMI 29.98 kg/m      Appearance:  Very anxious and disheveled  Mood: \"I do not know what is going on\"  Affect: Anxious  Suicidal Ideation: PRESENT / ABSENT: absent   Homicidal Ideation: PRESENT / ABSENT: absent   Thought process: unremarkable   Thought content: Patient has been perseverating about not being able to meet with his providers and getting a higher dose of vitamin D.  Fund of Knowledge: Average  Attention/Concentration: Fair  Language ability:  Intact  Memory: No memory problems  Insight:  fair.  Judgement: fair  Orientation: Yes, x4  Psychomotor Behavior: normal or unremarkable    Muscle Strength and Tone: MuscleStrength: Normal  Gait and Station: Normal       LABS   personally reviewed.     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 Ongoing education given regarding diagnostic and treatment options with risks, benefits and alternatives and adequate verbalization of understanding.  2. Medications      Clomipramine to 50 mg twice daily       Pristiq 50 mg daily      Prazosin 1 mg at bedtime      Ativan 1 mg 3 times a day      Vitamin D 50 mcg daily  3. Hospitalist to follow-up as needed  4.  working on a safe discharge plan.  5. Psychology is following the patient.    Risk Assessment: [facility] RISK ASSESSMENT: Patient able to contract for safety    Coordination of Care:   Treatment Plan reviewed and physician signed, Care discussed with Care/Treatment Team Members, Chart reviewed and Patient seen      Re-Certification I certify that the inpatient psychiatric facility services furnished since the previous certification were, and continue to be, medically necessary for, either, treatment which could reasonably be expected to improve the patient s condition or diagnostic study and that the hospital records indicate that the services furnished were, either, intensive treatment services, admission and related services necessary for diagnostic study, or equivalent services.     I certify that the patient continues to need, on a daily basis, active treatment furnished directly by or requiring the supervision of inpatient psychiatric facility personnel.   I estimate 7 days of hospitalization is necessary for proper treatment of the patient. My plans for post-hospital care for this patient are  Home with home care services     Amber Tyson MD    -     7/19/2021     -    1:45 PM    Total time 25 minutes with > 50%spent on coordination of cares and psycho-education.    This note was created with  help of Dragon dictation system. Grammatical / typing errors are not intentional.    Amber Tyson MD

## 2021-07-19 NOTE — PROGRESS NOTES
07/19/21 1322   Engagement   Intervention Group   Topic Detail OT Wellness group-self cares and cognitive exercises for social engagement, symptom management, self cares, mental wellness and following directions   Attendance Did not attend   Reason for Not Attending   (meeting with SW during in person invite)

## 2021-07-20 VITALS
DIASTOLIC BLOOD PRESSURE: 88 MMHG | TEMPERATURE: 98.3 F | WEIGHT: 204 LBS | RESPIRATION RATE: 20 BRPM | BODY MASS INDEX: 30.21 KG/M2 | HEART RATE: 102 BPM | OXYGEN SATURATION: 98 % | HEIGHT: 69 IN | SYSTOLIC BLOOD PRESSURE: 126 MMHG

## 2021-07-20 LAB
CLOMIPRAMINE SERPL-MCNC: 37 NG/ML
CLOMIPRAMINE+NOR SERPL-MCNC: 69 NG/ML
NORCLOMIPRAMINE SERPL-MCNC: 32 NG/ML
SARS-COV-2 RNA RESP QL NAA+PROBE: NEGATIVE

## 2021-07-20 PROCEDURE — 250N000013 HC RX MED GY IP 250 OP 250 PS 637: Performed by: PSYCHIATRY & NEUROLOGY

## 2021-07-20 PROCEDURE — 250N000013 HC RX MED GY IP 250 OP 250 PS 637

## 2021-07-20 PROCEDURE — 87635 SARS-COV-2 COVID-19 AMP PRB: CPT | Performed by: PSYCHIATRY & NEUROLOGY

## 2021-07-20 PROCEDURE — 99239 HOSP IP/OBS DSCHRG MGMT >30: CPT | Performed by: PSYCHIATRY & NEUROLOGY

## 2021-07-20 RX ORDER — BACLOFEN 5 MG/1
2.5 TABLET ORAL 3 TIMES DAILY PRN
Qty: 9 TABLET | Refills: 0 | Status: SHIPPED | OUTPATIENT
Start: 2021-07-20 | End: 2021-07-22

## 2021-07-20 RX ORDER — PRAZOSIN HYDROCHLORIDE 1 MG/1
1 CAPSULE ORAL AT BEDTIME
Qty: 30 CAPSULE | Refills: 0 | Status: SHIPPED | OUTPATIENT
Start: 2021-07-20 | End: 2023-09-18

## 2021-07-20 RX ORDER — CLOMIPRAMINE HYDROCHLORIDE 50 MG/1
50 CAPSULE ORAL AT BEDTIME
Qty: 30 CAPSULE | Refills: 0 | Status: SHIPPED | OUTPATIENT
Start: 2021-07-20 | End: 2023-09-18

## 2021-07-20 RX ORDER — DESVENLAFAXINE 50 MG/1
50 TABLET, FILM COATED, EXTENDED RELEASE ORAL DAILY
Qty: 30 TABLET | Refills: 0 | Status: SHIPPED | OUTPATIENT
Start: 2021-07-21 | End: 2021-07-20

## 2021-07-20 RX ORDER — LORAZEPAM 1 MG/1
1 TABLET ORAL 3 TIMES DAILY
Qty: 90 TABLET | Refills: 0 | Status: SHIPPED | OUTPATIENT
Start: 2021-07-20 | End: 2023-09-18

## 2021-07-20 RX ORDER — BACLOFEN 5 MG/1
2.5 TABLET ORAL 3 TIMES DAILY PRN
Qty: 90 TABLET | Refills: 0 | Status: SHIPPED | OUTPATIENT
Start: 2021-07-20 | End: 2021-07-20

## 2021-07-20 RX ORDER — CLOMIPRAMINE HYDROCHLORIDE 50 MG/1
50 CAPSULE ORAL DAILY
Qty: 30 CAPSULE | Refills: 0 | Status: SHIPPED | OUTPATIENT
Start: 2021-07-21 | End: 2023-09-18

## 2021-07-20 RX ORDER — VITAMIN B COMPLEX
50 TABLET ORAL DAILY
Qty: 60 TABLET | Refills: 0 | Status: SHIPPED | OUTPATIENT
Start: 2021-07-21

## 2021-07-20 RX ORDER — DESVENLAFAXINE 50 MG/1
50 TABLET, FILM COATED, EXTENDED RELEASE ORAL DAILY
Qty: 3 TABLET | Refills: 0 | Status: SHIPPED | OUTPATIENT
Start: 2021-07-21 | End: 2023-09-18

## 2021-07-20 RX ADMIN — LORAZEPAM 1 MG: 0.5 TABLET ORAL at 13:01

## 2021-07-20 RX ADMIN — ACETAMINOPHEN 650 MG: 325 TABLET ORAL at 04:28

## 2021-07-20 RX ADMIN — CLOMIPRAMINE HCL 50 MG: 50 CAPSULE ORAL at 08:30

## 2021-07-20 RX ADMIN — LORAZEPAM 1 MG: 0.5 TABLET ORAL at 08:30

## 2021-07-20 RX ADMIN — Medication 50 MCG: at 08:30

## 2021-07-20 RX ADMIN — Medication 2.5 MG: at 04:28

## 2021-07-20 RX ADMIN — DESVENLAFAXINE 50 MG: 50 TABLET, FILM COATED, EXTENDED RELEASE ORAL at 08:30

## 2021-07-20 ASSESSMENT — MIFFLIN-ST. JEOR: SCORE: 1810.72

## 2021-07-20 NOTE — PLAN OF CARE
Behavioral Discharge Planning and Instructions     Summary: You were admitted on 7/1/2021  due to Obsessive Compulsive Disorder (OCD), Autism Spectrum Disorder (ASD) and Suicidal Ideations.  You were treated by Dr. Roche and discharged on 07/20/2021 from [facility] to with a new behavorial health and care agency [facility]     Main Diagnosis: OCD     Health Care Follow-up:   Appointments prescheduled: scheduled, explain Patient has existing ongoing established psychiatry with Rosanna dougherty nurse practitioner at [facility]. At this time Family and patient may transition their care to a psychiatrist at discharge. Mother has appointments scheduled.     Attend all scheduled appointments with your outpatient providers. Call at least 24 hours in advance if you need to reschedule an appointment to ensure continued access to your outpatient providers.      Major Treatments, Procedures and Findings:  You were provided with: assessed for medical stability, medication evaluation and/or management, group therapy, family therapy, individual therapy, milieu management and medical interventions     Symptoms to Report: Concerns about increased sweating. Increased anxiety due to new service provider     Early warning signs can include: increased depression or anxiety     Safety and Wellness:  Take all medicines as directed.  Make no changes unless your doctor suggests them.      Follow treatment recommendations.  Refrain from alcohol and non-prescribed drugs.  If there is a concern for safety, call 911.     Resources:   Crisis Intervention: [phone] or [phone] (TTY: [phone]).  Call anytime for help.  [facility] Crisis [facility] - Adult [phone]  Crisis Intervention: [phone] or [phone]. Call anytime for help.      General Medication Instructions:   See your medication sheet(s) for instructions.   Take all medicines as directed.  Make no changes unless your doctor suggests them.   Go to  all your doctor visits.  Be sure to have all your required lab tests. This way, your medicines can be refilled on time.  Do not use any drugs not prescribed by your doctor.  Avoid alcohol.     The Treatment team has appreciated the opportunity to work with you. If you have any questions or concerns about your recent admission, you can contact the unit which can receive your call 24 hours a day, 7 days a week. They will be able to get in touch with a Provider if needed. The unit number is  .     Medical Follow-up appointment: Dr.Alison Rondon on Thursday July 22 at 11:30 a.m..   Address: [address] [place] [address]  Phone: [phone]   Bring the following to your appointment: all medications, photo id, insurance card, co-pay (if applicable) and discharge paper work from hospital to appt. If you need to change or cancel appt, please call your clinic.

## 2021-07-20 NOTE — PLAN OF CARE
Problem: Relapse Prevention  Goal: Engage in OT Group  Description: Target: Patient will engaged in 1 OT group activity daily that supports recovery this reporting period  Outcome: Completed     Occupational Therapy Discharge Summary      Patient Name: Glenroy Adler    Date of OT Discharge: 7/20/2021    Discontinue OT services. Refer to flowsheet and notes for progress towards goals.    Discharge Comments: Home with OP supports    Therapist: Jeanne Aquino OT  Date:7/20/2021

## 2021-07-20 NOTE — PLAN OF CARE
BEHAVIORAL TEAM DISCUSSION    Participants:     -Amber Kim MD  -Ortega WARE RN  -Jeanne MONDRAGON OT  -Georgie LEIGH PharmD     Progress: Improving  Anticipated length of stay: Friday 07/16/21  Continued Stay Criteria/Rationale: Symptom stabilization Medication Management Care Coordination  Medical/Physical:   Autism Spectrum Disorder  Precautions:  Did not carry over from nursing note  Plan: Behavorial Plan  Rationale for change in precautions or plan: No changes

## 2021-07-20 NOTE — PLAN OF CARE
Problem: Pain Acute  Goal: Acceptable Pain Control and Functional Ability  Description: Denied pain.   Outcome: Improving     Writer came onto shift at 1900. Pt denies pain, denies psych symptoms, contracts for safety. Pt on unit for part of movie, then went back to room.     Randi Evans RN

## 2021-07-20 NOTE — PLAN OF CARE
"Patient was endorsed anxiety and depression at a 10 this morning.  Patient expressed that he felt \"rushed\".  Patient was hyperventilating as he explained his frustrations this morning.  \" Breakfast was late, I have to shower, I should have showered last night.  I don't have enough time now, because lunch is at 1130.  Writer assured patient that staff would not rush him to take a shower, further explaining to patient that it was only 0830 and he had 3 hours before lunch was to arrive to take care of his hygiene.  Patient was not easily redirectable, but did accept his scheduled medications.  Patient later finished his breakfast and was able to shower with enough time before lunch.  Patient remained isolative to himself, intermittently peeking out his door to check the time or ask questions.  Patient was planned for discharge this shift with his mom scheduled for his transport to his facility.  Patient's belongings were reviewed and returned, discharge paperwork was reviewed and given to patient.  Problem: Behavioral Health Plan of Care  Goal: Plan of Care Review  Outcome: Adequate for Discharge  Goal: Adheres to Safety Considerations for Self and Others  Outcome: Adequate for Discharge  Goal: Absence of New-Onset Illness or Injury  Outcome: Adequate for Discharge  Goal: Optimized Coping Skills in Response to Life Stressors  Outcome: Adequate for Discharge  Goal: Develops/Participates in Therapeutic [place] to Support Successful Transition  Outcome: Adequate for Discharge     Problem: Suicidal Behavior  Goal: Suicidal Behavior is Absent or Managed  Outcome: Adequate for Discharge     Problem: Pain Acute  Goal: Acceptable Pain Control and Functional Ability  Description: Denied pain.   Outcome: Adequate for Discharge     Problem: Sleep Disturbance  Goal: Adequate Sleep/Rest  Outcome: Adequate for Discharge     "

## 2021-07-20 NOTE — DISCHARGE SUMMARY
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    This is a 41 y.o. male with history of OCD (obsessive compulsive disorder), patient is single, with no children domiciled by him self in a town home but he has a care team from a private company, patient is on disability; that was admitted to the psychiatric inpatient unit due to severe depression and inability to take care of himself due to multiple recent medication changes.     Today patient was seen and evaluated in the consult room with nurse practitioner present during the assessment, this was a face-to-face evaluation.  Patient presented during the assessment extremely anxious, perseverating on his medications and I did again not able to provide accurate information.  His thought process is very concrete and he clearly has multiple obsessions.  Patient was perseverating on him not receiving his clomipramine and prazosin last night.  I informed the patient that prior to starting any medications we need to verify with the pharmacy and I always like to meet with the patient is to make sure that the medications the pharmacy reviewed are correct.  At the end patient verbalized good understanding and was in agreement with proceeding with the assessment.     Patient said that he had a huge fall because one of the medications that he is outpatient provider prescribed.  He was supposed to take this  "medication as needed for anxiety (Periactin).  Last Monday at 6 in the morning was when he had his fall and patient thinks that the new medication \"screwed up with the melatonin\". He was drowsy, foggy, got up and he fell down the stairs.  Patient tells me that he agreed to come to the hospital because he needs some to do a thorough look at he is medications.  Patient was extremely anxious at the moment of the assessment and he was unable to fully talk about his symptoms and how he has been feeling at home.  Patient did reported that he is nervous, anxious, overlwhelmed, scared because he wants things to get better.  Patient verbalized having problems with anxiety and depression. Ever since the accident last Monday this has been worst. Has memory problems, sensory problems, concentration is poor and he even said that he has problems understanding the suzanne here in the unit.  Patient was very upset about not taking the clomipramine this morning but at the same time patient tells me that he doesn't think the Clomipramine has been helpful, and the outpatient psychiatrist has been thinking about taking it away.  Patient said that over the last few days the only medication that was helpful was the lorazepam that he received in the emergency room.  He has developed bad side effects to the hydroxyzine and does not want to take this medication anymore.  At this time patient denies having any thoughts about harming himself or harming others and he has been able to contract for safety.  Denies having any hallucinations and no delusions have been elicited.      When talking about medications patient tells me that he has to stay away form all selective serotonin reuptake inhibitor because they cause more harm.  He also has to stay away form antipsychotics, they do nothing and \"screw him up\" (EPS?).  Patient was not able to give me a list of prior medications use.he  believed that he took Abilify and this medication was not " beneficial..      I was able to communicate with patient's mother Cleo over the phone with patient present during the interview.  Mother was also very upset about the patient not being started on clomipramine last night.  I have informed the mother the reasons why this was not done.  First I needed for the pharmacy to review the medication and I needed to review the medication myself with the patient.  Mother was also upset about the staff in the evening and she was constantly asking if we do have nurses working in the unit.  I did confirmed with the mother that we do have a nurse working with the patient on every shift.       Mother reported that the prazosin and the antyhistamine cause more problems and made the patient very anxious. Lorazepam has been the best medication for him, it calms him down, and he is able to function.  Lorazepam is not causing any side effects. The hesitaiton with his outpatient provider to give him this medication is because is related to valium and they do not want to put him on anything that is addictive.  Patient was very used to take Valium as was dependant on it. In 2019 the outpatient providers started to taper down the Valium. Valium was completely discontinued on May 23, 2021.  Mother thinks that after the discontinuation of the Valium the anxiety and the panic started coming forward.  In addition to all of this patient has been experiencing problems with staffing.  There have been a lot of changes for the patient at home.  He does not have the same staff working with him consistently, this has caused a lot of anxiety and has increased patient's obsessive thinking.  Mother reported that he currently the patient doesn't want to get out of bed, is having panick attacks and doesn't want to face the problems with the staff. He can't prepare food for him self, has not shower in a month and half and he is unable to leave his house.      At that moment I discussed with the patient and  mother the tentative treatment plan.  First I have placed a consult for the hospitalist to come and see the patient as his blood pressure has been elevated.  Second I have sent a message to the psychologist in order to work with the patient in creating a behavioral plan here in the unit..  I will communicate with the outpatient provider and coordinate cares with her.  In the meantime given that both patient and mother reported that the patient has done well on lorazepam and I will put him on lorazepam 0.5 mg 3 times a day.  Both mother and patient have verbalized good understanding and they are in agreement with this plan.     After I spoke with her outpatient providers I went back to the unit and met with the patient.  I reviewed with the patient the information that he was provided by the outpatient provider.  I discussed with the patient the use of Pristiq including reasons for prescribing this, benefits, risk and side effects.  After clarifying patient's questions he informed me is in agreement with a trial of Pristiq.     As per outpatient provider:   Patient has 2 different records in care everywhere and he has 2 different addresses.     Outpatient provider Rosanna Flores informed me that patient has been experiencing problems with the ability to cope with live, change and his anxiety. Valium was discontinue and the patient tolerated this well. More recently the problems is staffing. Staff has been quiting and he has been without staff.  Patient had an [facility] assessment that said that he needs 24/7 assistance.  This has led to more panic, patient making coments to wanting to kill him self because he wants to have people around him.  This cycle that is unbreakable at this moment.  The anxiety is not undercontrol.  Mark informed me that she wanted to take him off Clomipramine as this has not work to treat his anxiety, but the patient is not willing to change the medication at this time or increase the dose  of clonazepam. Patient is convinced any other medication will cause problems.  In the past when the patient gets a new medication he asked for the staff that works with him to google the medication for side effects for the parents will do that for him.  He had [facility] assessment done years ago.  Outpatient provider thinks that the patient can benefit from a trial of Pristiq.  She will be faxing the [employer] assessment and her last note.     Later on I did receive both and indeed in the [employer] assessment it said that Pristiq is a medication that can be tried on the patient.  On her progress note said that the patient has tried Prozac, Lexapro, Celexa, Remeron, trazodone, temazepam, clomipramine, Abilify, Seroquel, Zyprexa, Depakote, diazepam, lorazepam, hydroxyzine, clonidine, guanfacine, propanolol, propanolol LA, melatonin, Benadryl and l-methylfolate.       HOSPITAL COURSE   Admitted due to aforementioned presentation.  Education regarding diagnostic and treatment options with risks, benefits and alternatives and adequate verbalization of understanding.  Discussed reviewed in further detail, stressors and events leading to presentation.    Admitted on a voluntary status.    Patient was started on a multimodal therapy that included medications.  After having a prolonged discussion with the patient, his mother and the outpatient provider initially we decided to decrease the clomipramine to 25 mg daily and 50 mg at bedtime.  At the same time Pristiq was started at 25 mg daily.  Ativan was started at 0.5 mg 3 times a day.  The rest of the medications were kept the same.  Patient was able to improve with this medication changes but continues to have episodes of severe anxiety and panic attacks.  Eventually the clomipramine was adjusted back again to 50 mg 2 times a day, Pristiq was increased to 50 mg daily, Ativan was increased to 1 mg 3 times a day and prazosin was decreased to 1 mg at bedtime.  We also added  baclofen 2.5 mg 3 times a day as needed for anxiety.  The psychology team has been able to meet with the patient regularly and they were able to create a behavioral plan for the patient.  Occupational Therapy also met with the patient and they did a sensory assessment.  With these adjustments on the medication patient has been able to do much better.  Patient continues to have episodes where he has panic attacks, this episodes have decreased and the patient has been more able to manage using coping skills.     At this time patient is denying having SI, HI or hallucinations and no delusions have been elicited. Patient reported that he is feeling ready to go home and he has been doing much better. Patient is aware of the treatment and discharge plans and is in agreement with this. During the hospital stay patient has been able to engaged in the therapy. Patient has been attending certain groups and he has been social with certain peers. Patient has not engaged in any aggressive or self injurious behaviors and this has been consistent with my observations and the observations of the staff.     During the hospital stay patient was seen by OT, the psychology team, SW and psychiatry on a regular basis. We have kept in constant communication with patient's parents and they are in agreement with the discharge plans. [facility] also has been able to coordinate with the [facility] and patient will be starting in home services once he is discharge.     Today I have spend today 60 minutes with the patient's insurance 1 [phone] and I have completed the prior authorization for Pristiq and Bacofen. This will be process in 72 hours. I have send the patient a 3 day supply to our pharmacy in order to ensure the patient has the medications that he needs.  Case Number [id number]. Fax with with approval will be received at the fax . I have been informed that both medications have been approved and the patient can pick this today.  "     MENTAL STATUS EXAM   Vitals: /88   Pulse 102   Temp 98.3  F (36.8  C) (Oral)   Resp 20   Ht 1.753 m (5' 9\")   Wt 92.5 kg (204 lb)   SpO2 98%   BMI 30.13 kg/m      Mental Status:  Appearance:  No apparent distress  Mood:  {Mood: Normal  Affect: restricted was was congruent to speech  Suicidal Ideation: PRESENT / ABSENT: absent   Homicidal Ideation: PRESENT / ABSENT: absent   Thought process: unremarkable   Thought content: devoid of  obsessions SI or HI  Fund of Knowledge: Average  Attention/Concentration: Fair  Language ability:  Intact  Memory:  Immediate recall intact, Short-term memory intact and Long-term memory intact  Insight:  fair.  Judgement: fair  Orientation: Yes, x4  Psychomotor Behavior: normal or unremarkable    Muscle Strength and Tone: MuscleStrength: Normal  Gait and Station: Normal         DISCHARGE MEDICATIONS   Discharge Medication Options:   Current Discharge Medication List      START taking these medications    Details   Baclofen (LIORESAL) 5 MG tablet Take 0.5 tablets (2.5 mg) by mouth 3 times daily as needed for other (anxiety)  Qty: 9 tablet, Refills: 0    Associated Diagnoses: Mixed obsessional thoughts and acts      desvenlafaxine (PRISTIQ) 50 MG 24 hr tablet Take 1 tablet (50 mg) by mouth daily  Qty: 3 tablet, Refills: 0    Associated Diagnoses: Mixed obsessional thoughts and acts      LORazepam (ATIVAN) 1 MG tablet Take 1 tablet (1 mg) by mouth 3 times daily  Qty: 90 tablet, Refills: 0    Associated Diagnoses: Mixed obsessional thoughts and acts      Vitamin D3 (CHOLECALCIFEROL) 25 mcg (1000 units) tablet Take 2 tablets (50 mcg) by mouth daily  Qty: 60 tablet, Refills: 0    Associated Diagnoses: Mixed obsessional thoughts and acts         CONTINUE these medications which have CHANGED    Details   !! clomiPRAMINE (ANAFRANIL) 50 MG capsule Take 1 capsule (50 mg) by mouth At Bedtime  Qty: 30 capsule, Refills: 0    Associated Diagnoses: Mixed obsessional thoughts and acts "      !! clomiPRAMINE (ANAFRANIL) 50 MG capsule Take 1 capsule (50 mg) by mouth daily  Qty: 30 capsule, Refills: 0    Associated Diagnoses: Mixed obsessional thoughts and acts      prazosin (MINIPRESS) 1 MG capsule Take 1 capsule (1 mg) by mouth At Bedtime  Qty: 30 capsule, Refills: 0    Associated Diagnoses: Mixed obsessional thoughts and acts       !! - Potential duplicate medications found. Please discuss with provider.      CONTINUE these medications which have NOT CHANGED    Details   melatonin 5 MG tablet Take 3 tablets (15 mg) by mouth nightly as needed for sleep  Qty: 90 tablet, Refills: 0    Associated Diagnoses: Mixed obsessional thoughts and acts         STOP taking these medications       cyproheptadine (PERIACTIN) 4 mg tablet Comments:   Reason for Stopping:               Medication adherence issues: MS Med Adherence Y/N: Yes, Hospitalization  Medication side effects: MEDICATION SIDE EFFECTS: no side effects reported         DISCHARGE PLAN   1.  Education given regarding diagnostic and treatment options with risks, benefits and alternatives with adequate verbalization of understanding.  2.  Discharge to home with his home care providers.  Upon detailed review of risk factors, patient amenable for release.   3.  Continue aforementioned medications and associated medication changes with follow-up by outpatient mental health provider.  4.  Crisis management planning in place.    5.  Continue efforts for sobriety.    .  Nursing and  to review further discharge recommendations.     TOTAL TIME:  Greater than 30 minutes for discharge planning.    This note was created with help of Dragon dictation system. Grammatical / typing errors are not intentional.    Amber Tyson MD

## 2021-07-20 NOTE — PROVIDER NOTIFICATION
07/19/21 0100   Engagement   Intervention Group   Topic Insight development/self-awareness   Topic Detail Building Confidence   Attendance Did not attend

## 2021-07-20 NOTE — PROGRESS NOTES
07/20/21 0930   Engagement   Intervention Group   Topic Detail OT: Education regarding the 8 Dimensions of Wellness with interactive social and cognitive activity (Tenzi) to increase concentration, focus, short term memory, recall, visual scanning, fine motor skills, healthy leisure engagement, social engagement, intellectual wellness, and social wellness   Attendance Did not attend   Reason for Not Attending Refused     Pt did not attend after 1:1 invite.

## 2021-07-20 NOTE — PLAN OF CARE
Problem: Pain Acute  Goal: Acceptable Pain Control and Functional Ability  Description: Denied pain.   7/20/2021 0104 by Randi Evans, RN  Outcome: Improving  7/19/2021 2242 by Randi Evans RN  Outcome: Improving     Problem: Sleep Disturbance  Goal: Adequate Sleep/Rest  7/20/2021 0104 by Randi Evans, RN  Outcome: Improving  7/19/2021 2242 by Randi Evans RN  Outcome: Improving     Pt c/o headache 5/10 and anxiety at 0428 and was given PRN baclofen and PRN Apap at that time. Resting quietly with eyes closed upon reassessment.  Pt slept greater than 6 hours. Safety checks completed q 15 minutes per unit policy.     Randi Evans RN

## 2021-07-22 RX ORDER — BACLOFEN 5 MG/1
2.5 TABLET ORAL 3 TIMES DAILY PRN
Qty: 90 TABLET | Refills: 0 | Status: SHIPPED | OUTPATIENT
Start: 2021-07-22

## 2022-09-28 ENCOUNTER — HOSPITAL ENCOUNTER (EMERGENCY)
Facility: HOSPITAL | Age: 42
Discharge: HOME | End: 2022-09-29
Attending: STUDENT IN AN ORGANIZED HEALTH CARE EDUCATION/TRAINING PROGRAM | Admitting: STUDENT IN AN ORGANIZED HEALTH CARE EDUCATION/TRAINING PROGRAM
Payer: MEDICARE

## 2022-09-28 DIAGNOSIS — T50.905A ADVERSE EFFECT OF DRUG, INITIAL ENCOUNTER: ICD-10-CM

## 2022-09-28 DIAGNOSIS — R44.3 HALLUCINATIONS: ICD-10-CM

## 2022-09-28 LAB
ALBUMIN SERPL BCG-MCNC: 5 G/DL (ref 3.5–5.2)
ALP SERPL-CCNC: 79 U/L (ref 40–129)
ALT SERPL W P-5'-P-CCNC: 27 U/L (ref 10–50)
ANION GAP SERPL CALCULATED.3IONS-SCNC: 15 MMOL/L (ref 7–15)
AST SERPL W P-5'-P-CCNC: 21 U/L (ref 10–50)
BASOPHILS # BLD AUTO: 0.1 10E3/UL (ref 0–0.2)
BASOPHILS NFR BLD AUTO: 1 %
BILIRUB SERPL-MCNC: 0.3 MG/DL
BUN SERPL-MCNC: 12.4 MG/DL (ref 6–20)
CALCIUM SERPL-MCNC: 9.7 MG/DL (ref 8.6–10)
CHLORIDE SERPL-SCNC: 101 MMOL/L (ref 98–107)
CREAT SERPL-MCNC: 0.98 MG/DL (ref 0.67–1.17)
DEPRECATED HCO3 PLAS-SCNC: 26 MMOL/L (ref 22–29)
EOSINOPHIL # BLD AUTO: 0.1 10E3/UL (ref 0–0.7)
EOSINOPHIL NFR BLD AUTO: 1 %
ERYTHROCYTE [DISTWIDTH] IN BLOOD BY AUTOMATED COUNT: 12.1 % (ref 10–15)
ETHANOL SERPL-MCNC: <0.01 G/DL
GFR SERPL CREATININE-BSD FRML MDRD: >90 ML/MIN/1.73M2
GLUCOSE SERPL-MCNC: 102 MG/DL (ref 70–99)
HCT VFR BLD AUTO: 50 % (ref 40–53)
HGB BLD-MCNC: 17.8 G/DL (ref 13.3–17.7)
IMM GRANULOCYTES # BLD: 0.1 10E3/UL
IMM GRANULOCYTES NFR BLD: 1 %
LYMPHOCYTES # BLD AUTO: 2.5 10E3/UL (ref 0.8–5.3)
LYMPHOCYTES NFR BLD AUTO: 23 %
MCH RBC QN AUTO: 29.3 PG (ref 26.5–33)
MCHC RBC AUTO-ENTMCNC: 35.6 G/DL (ref 31.5–36.5)
MCV RBC AUTO: 82 FL (ref 78–100)
MONOCYTES # BLD AUTO: 1 10E3/UL (ref 0–1.3)
MONOCYTES NFR BLD AUTO: 9 %
NEUTROPHILS # BLD AUTO: 7.4 10E3/UL (ref 1.6–8.3)
NEUTROPHILS NFR BLD AUTO: 65 %
NRBC # BLD AUTO: 0 10E3/UL
NRBC BLD AUTO-RTO: 0 /100
PLATELET # BLD AUTO: 285 10E3/UL (ref 150–450)
POTASSIUM SERPL-SCNC: 3.8 MMOL/L (ref 3.4–5.3)
PROT SERPL-MCNC: 7.8 G/DL (ref 6.4–8.3)
RBC # BLD AUTO: 6.07 10E6/UL (ref 4.4–5.9)
SODIUM SERPL-SCNC: 142 MMOL/L (ref 136–145)
TROPONIN T SERPL HS-MCNC: 7 NG/L
WBC # BLD AUTO: 11 10E3/UL (ref 4–11)

## 2022-09-28 PROCEDURE — 93005 ELECTROCARDIOGRAM TRACING: CPT | Performed by: STUDENT IN AN ORGANIZED HEALTH CARE EDUCATION/TRAINING PROGRAM

## 2022-09-28 PROCEDURE — 85025 COMPLETE CBC W/AUTO DIFF WBC: CPT | Performed by: STUDENT IN AN ORGANIZED HEALTH CARE EDUCATION/TRAINING PROGRAM

## 2022-09-28 PROCEDURE — 99285 EMERGENCY DEPT VISIT HI MDM: CPT | Mod: 25

## 2022-09-28 PROCEDURE — 80053 COMPREHEN METABOLIC PANEL: CPT | Performed by: STUDENT IN AN ORGANIZED HEALTH CARE EDUCATION/TRAINING PROGRAM

## 2022-09-28 PROCEDURE — 82077 ASSAY SPEC XCP UR&BREATH IA: CPT | Performed by: STUDENT IN AN ORGANIZED HEALTH CARE EDUCATION/TRAINING PROGRAM

## 2022-09-28 PROCEDURE — 36415 COLL VENOUS BLD VENIPUNCTURE: CPT | Performed by: STUDENT IN AN ORGANIZED HEALTH CARE EDUCATION/TRAINING PROGRAM

## 2022-09-28 PROCEDURE — 84484 ASSAY OF TROPONIN QUANT: CPT | Performed by: STUDENT IN AN ORGANIZED HEALTH CARE EDUCATION/TRAINING PROGRAM

## 2022-09-28 PROCEDURE — 250N000013 HC RX MED GY IP 250 OP 250 PS 637: Performed by: STUDENT IN AN ORGANIZED HEALTH CARE EDUCATION/TRAINING PROGRAM

## 2022-09-28 RX ADMIN — Medication 1 MG: at 23:43

## 2022-09-28 ASSESSMENT — ACTIVITIES OF DAILY LIVING (ADL)
ADLS_ACUITY_SCORE: 37
ADLS_ACUITY_SCORE: 37

## 2022-09-29 VITALS
HEART RATE: 90 BPM | TEMPERATURE: 98.3 F | OXYGEN SATURATION: 98 % | RESPIRATION RATE: 20 BRPM | DIASTOLIC BLOOD PRESSURE: 62 MMHG | SYSTOLIC BLOOD PRESSURE: 153 MMHG

## 2022-09-29 LAB
AMPHETAMINES UR QL SCN: NORMAL
ATRIAL RATE - MUSE: 102 BPM
BARBITURATES UR QL SCN: NORMAL
BENZODIAZ UR QL SCN: NORMAL
BZE UR QL SCN: NORMAL
CANNABINOIDS UR QL SCN: NORMAL
DIASTOLIC BLOOD PRESSURE - MUSE: NORMAL MMHG
INTERPRETATION ECG - MUSE: NORMAL
OPIATES UR QL SCN: NORMAL
P AXIS - MUSE: 28 DEGREES
PCP QUAL URINE (ROCHE): NORMAL
PR INTERVAL - MUSE: 148 MS
QRS DURATION - MUSE: 84 MS
QT - MUSE: 330 MS
QTC - MUSE: 430 MS
R AXIS - MUSE: 13 DEGREES
SYSTOLIC BLOOD PRESSURE - MUSE: NORMAL MMHG
T AXIS - MUSE: 63 DEGREES
VENTRICULAR RATE- MUSE: 102 BPM

## 2022-09-29 PROCEDURE — 80307 DRUG TEST PRSMV CHEM ANLYZR: CPT | Performed by: STUDENT IN AN ORGANIZED HEALTH CARE EDUCATION/TRAINING PROGRAM

## 2022-09-29 PROCEDURE — 90791 PSYCH DIAGNOSTIC EVALUATION: CPT

## 2022-09-29 ASSESSMENT — ACTIVITIES OF DAILY LIVING (ADL)
ADLS_ACUITY_SCORE: 37
ADLS_ACUITY_SCORE: 37

## 2022-09-29 NOTE — DISCHARGE INSTRUCTIONS
Aftercare Plan    Call psychiatrist to have medications adjusted over the phone. You can do this prior to your appointment, so you can start weaning off of you medications earlier.    If I am feeling unsafe or I am in a crisis, I will:   Contact my established care providers   Call the National Suicide Prevention Lifeline: 601.124.5829   Go to the nearest emergency room   Call 911     Warning signs that I or other people might notice when a crisis is developing for me:     I am having increasing suicidal thoughts that turn to plans with intent or means  I am having additional urges to self-harm    My emotions are of hopelessness; feeling like there's no way out.  Rage or anger.  Engaging in risky activities without thinking  Withdrawing from family/friends  Dramatic mood swings  Drastic personality changes   Use of alcohol or drugs  Postings on social media  Neglect of personal hygiene or cares     Things I am able to do on my own to cope or help me feel better:    Spending quality time with loved ones  Staying hydrated  Eating balanced meals  Going for a walk every day  Take care of daily responsibilities/needs  Focus on positive self-talk vs negative self-talk    Things that I am able to do with others to cope or help me better:   Exercise  Music  Deep breathing  Meditations  Journal  Self-regulate  Self check-in  Ask for help    Things I can use or do for distraction:   Reach out to/spend time with family, friends  Shower  Exercise  Chores or do a project  Listen to music  Watch movie/TV  Listening to music  Journaling  Reading a book  Meditating  Call a friend    Changes I can make to support my mental health and wellness:    -I will abstain from all mood altering chemicals not currently prescribed to me   -I will attend scheduled mental health therapy and psychiatric appointments and follow all   recommendations  -I will commit to 30 minutes of self care daily - this can be as simple as taking a shower, going  for a   walk, cooking a meal, read, writing, etc  -I will practice square breathing when I begin to feel anxious - in breath through the nose for the count   of 4 and the first line on the square. Out breath through the mouth for the count of 4 for the second line   of the square. Repeat to complete the square. Repeat the square as many times as needed.  - I will use distraction skills of: going for walks, watching TV, spending time outside, calling a friend or   family member  -Use community resources, including hotline numbers, [facility] crisis and support meetings  -Maintain a daily schedule/routine  -Practice deep breathing skills  -Download a meditation ish and spend 15-20 minutes per day mediating/relaxing. Some apps to   download include: Calm, Headspace and Insight Timer. All 3 of these apps have free version    Reduce Extreme Emotion  QUICKLY:  Changing Your Body Chemistry      T:  Change your body Temperature to change your autonomic nervous system   Use Ice Water to calm yourself down FAST   Put your face in a bowl of ice water (this is the best way; have the person keep his/her face in ice water for 30-45 seconds - initial research is showing that the longer s/he can hold her/his face in the water, the better the response), or   Splash ice water on your face, or hold an ice pack on your face      I:  Intensely exercise to calm down a body revved up by emotion   Examples: running, walking fast, jumping, playing basketball, weight lifting, swimming, calisthenics, etc.   Engage in exercises that DO NOT include violent behaviors. Exercises that utilize violent behaviors tend to function as  behavioral rehearsal,  and rather than calming the person down, may actually  rev  the person up more, increasing the likelihood of violence, and lessening the likelihood that they will  burn off  energy     P:  Progressively relax your muscles   Starting with your hands, moving to your forearms, upper arms, shoulders, neck,  forehead, eyes, cheeks and lips, tongue and teeth, chest, upper back, stomach, buttocks, thighs, calves, ankles, feet   Tense (10 seconds,   of the way), then relax each muscle (all the way)   Notice the tension   Notice the difference when relaxed (by tensing first, and then relaxing, you are able to get a more thorough relaxation than by simply relaxing)      P: Paced breathing to relax   The standard technique is to begin with counting the number of steps one takes for a typical inhale, then counting the steps one takes for a typical exhale, and then lengthening the amount of steps for the exhalation by one or two steps.  OR  Repeat this pattern for 1-2 minutes  Inhale for four (4) seconds   Exhale for six (6) to eight (8) seconds   Research demonstrated that one can change one's overall level of anxiety by doing this exercise for even a few minutes per day      People in my life that I can ask for help:   Family  Friends  Providers    Your [facility] has a mental health crisis team you can call 24/7:   [facility] Crisis: [phone] - Call the crisis line for immediate mental health support, 24 hours a day.     Other things that are important when I'm in crisis:   Ask for help    Additional resources and information:     Mental Health Apps  My3  https://[employer].org/    VirtualHopeBox  https://[employer].org/apps/virtual-hope-box/       Professionals or Agencies I Can Contact During A Crisis:       Crisis Lines  Crisis Text Line  Text 020363  You will be connected with a trained live crisis counselor to provide support.    [employer] (LGBTQ Youth Crisis Line)  7.966.488.2773  text START to 459-714    National [place] on Mental Illness (AMADA)  046.307.2967 or 9.88.AMADA.HELPS    National Suicide Prevention Lifeline at 8-998-186-XKGP (2501)     Throughout  [place]: call **CRISIS (**186624)     Crisis Text Line: is available for free, 24/7 by texting [place] to 914520    Community  "Resources  Fast Tracker  Linking people to mental health and substance use disorder resources  fasttrackermn.org     [place] Mental Health Warm Line  Peer to peer support  Monday thru Saturday, 12 pm to 10 pm  728.492.6220 or 5.082.441.6963  Text \"Support\" to 70213     National [place] on Mental Illness (www.[place].gladys.org): 225.518.2668 or 232-835-7187     [facility] Phone (free remote counseling): 611.601.2194 Web address:   [email or web address]"[employer].org/     www.Digg (filter for insurance, gender preference, etc.)    [facility]   [phone]  Intake appointment will be virtual, following appointments can be in person or virtual.   **IMMEDIATE OPENINGS**    [facility]  [phone]  *offers individual therapy, medication management and Mental Health Case Workers; can self refer    [facility]  [phone]  *Immediate Openings    Please follow up with scheduled providers to ensure all necessary paperwork is filled out prior to your   scheduled telehealth appointments.     Coordinators from [facility] will be calling within two business days to ensure   that you have the resources you may need or provide assistance with scheduling (Phone number: [phone].).    Remember: give the referrals 3 sessions prior to calling it quits. Do you trust them? Do you feel   understood? Do you think they can help? Check in with yourself after each session    Please reach out to the [facility]([phone]) regarding further mental health appointment needs for this emergency department visit.    [facility] SCHEDULING:  Today you were seen by a licensed mental health professional through [facility], [facility] ([facility])  for a crisis assessment in the Emergency Department at [facility].  It is recommended that you follow up with your estabished providers (psychiatrist, menta health therapist, " and/or primary care doctor - as relevant) as soon as possible. Coordinators from [facility] will be calling you in the next 24-48 hours to ensure that you have the resources you need.  You can so contact [facility] coordinators directly at [phone].     [facility] maintains an extensive network of licensed behavioral health providers to connect patients with the services they need.  We do not charge providers a fee to participate in our referral network.  We match patients with providers based on a patient s specific needs, insurance coverage, and location.  Our first effort will be to refer you to a provider within your care system, and will utilize providers outside your care system as needed.     Additional information  Today you were seen by a licensed mental health professional through [facility], [facility] (P)  for a crisis assessment in the Emergency Department at [facility].  It is recommended that you follow up with your established providers (psychiatrist, mental health therapist, and/or primary care doctor - as relevant) as soon as possible. Coordinators from [facility] will be calling you in the next 24-48 hours to ensure that you have the resources you need.  You can also contact [facility] coordinators directly at [phone]. You may have been scheduled for or offered an appointment with a mental health provider. [facility] maintains an extensive network of licensed behavioral health providers to connect patients with the services they need.  We do not charge providers a fee to participate in our referral network.  We match patients with providers based on a patient's specific needs, insurance coverage, and location.  Our first effort will be to refer you to a provider within your care system, and will utilize providers outside your care system as needed.

## 2022-09-29 NOTE — ED NOTES
"[facility] [place] ED Mental Health Handoff Note:     Assuming care from: Dr Kapil Thurston at 1:00 AM  2:17 AM I spoke to DEC regarding patient.  2:36 AM I spoke to patient. Patient denies SI, HI, he has a fear of hospitals and would like to go home, he does not wish to be admitted.  2:54 AM I spoke to DEC regarding patient.    Brief HPI: 42 year old male signed out to me by the above provider. See initial ED Provider note for full details of the presentation.   In brief, patient has been taking medication for hallucinations since January. He believes he is experiencing a build up of side effects from these medications. He notes feeling \"back logged\" (constipated), as well as experiencing night terrors and very vivid, strange dreams. Recently, he woke up after 7 nightmares in one night, breathing very hard. He reports having bruises on his chest and scrapes on his body that he finds after these nights. Patient also notes constant hallucinations on the walls, as well as random thoughts such as thinking that his mom is coming to drop off a $200 gift card. He denies thoughts of hurting self or others.    Home meds reviewed and ordered/administered: Yes  Medically stable for inpatient mental health admission: Yes.  Evaluated by mental health: Yes. The recommendation is for outpatient mental health treatment. Resources and plan given to patient.  Safety concerns: At the time I received sign out, there were no safety concerns.    Hold Status:  Active Orders   N/A            Labs/Imaging:   Results for orders placed or performed during the hospital encounter of 09/28/22 (from the past 24 hour(s))   Urine Drugs of Abuse Screen Panel 1+ - Drug Screen plus Methadone *Canceled*     Status: None ()    Collection Time: 09/28/22  9:07 PM    Narrative    The following orders were created for panel order Urine Drugs of Abuse Screen Panel 1+ - Drug Screen plus Methadone.  Procedure                               Abnormality         Status  "                    ---------                               -----------         ------                       Please view results for these tests on the individual orders.   CBC with platelets differential     Status: Abnormal    Collection Time: 09/28/22  9:38 PM    Narrative    The following orders were created for panel order CBC with platelets differential.  Procedure                               Abnormality         Status                     ---------                               -----------         ------                     CBC with platelets and d...[[id number]]  Abnormal            Final result                 Please view results for these tests on the individual orders.   Comprehensive metabolic panel     Status: Abnormal    Collection Time: 09/28/22  9:38 PM   Result Value Ref Range    Sodium 142 136 - 145 mmol/L    Potassium 3.8 3.4 - 5.3 mmol/L    Chloride 101 98 - 107 mmol/L    Carbon Dioxide (CO2) 26 22 - 29 mmol/L    Anion Gap 15 7 - 15 mmol/L    Urea Nitrogen 12.4 6.0 - 20.0 mg/dL    Creatinine 0.98 0.67 - 1.17 mg/dL    Calcium 9.7 8.6 - 10.0 mg/dL    Glucose 102 (H) 70 - 99 mg/dL    Alkaline Phosphatase 79 40 - 129 U/L    AST 21 10 - 50 U/L    ALT 27 10 - 50 U/L    Protein Total 7.8 6.4 - 8.3 g/dL    Albumin 5.0 3.5 - 5.2 g/dL    Bilirubin Total 0.3 <=1.2 mg/dL    GFR Estimate >90 >60 mL/min/1.73m2   Ethyl Alcohol Level     Status: Abnormal    Collection Time: 09/28/22  9:38 PM   Result Value Ref Range    Alcohol ethyl <0.01 (H) <=0.00 g/dL   Troponin T, High Sensitivity     Status: Normal    Collection Time: 09/28/22  9:38 PM   Result Value Ref Range    Troponin T, High Sensitivity 7 <=22 ng/L   CBC with platelets and differential     Status: Abnormal    Collection Time: 09/28/22  9:38 PM   Result Value Ref Range    WBC Count 11.0 4.0 - 11.0 10e3/uL    RBC Count 6.07 (H) 4.40 - 5.90 10e6/uL    Hemoglobin 17.8 (H) 13.3 - 17.7 g/dL    Hematocrit 50.0 40.0 - 53.0 %    MCV 82 78 - 100 fL    MCH 29.3  26.5 - 33.0 pg    MCHC 35.6 31.5 - 36.5 g/dL    RDW 12.1 10.0 - 15.0 %    Platelet Count 285 150 - 450 10e3/uL    % Neutrophils 65 %    % Lymphocytes 23 %    % Monocytes 9 %    % Eosinophils 1 %    % Basophils 1 %    % Immature Granulocytes 1 %    NRBCs per 100 WBC 0 <1 /100    Absolute Neutrophils 7.4 1.6 - 8.3 10e3/uL    Absolute Lymphocytes 2.5 0.8 - 5.3 10e3/uL    Absolute Monocytes 1.0 0.0 - 1.3 10e3/uL    Absolute Eosinophils 0.1 0.0 - 0.7 10e3/uL    Absolute Basophils 0.1 0.0 - 0.2 10e3/uL    Absolute Immature Granulocytes 0.1 <=0.4 10e3/uL    Absolute NRBCs 0.0 10e3/uL   Urine Drugs of Abuse Screen Panel 1+ - Drug Screen plus Methadone *Canceled*     Status: None ()    Collection Time: 09/28/22 11:22 PM    Narrative    The following orders were created for panel order Urine Drugs of Abuse Screen Panel 1+ - Drug Screen plus Methadone.  Procedure                               Abnormality         Status                     ---------                               -----------         ------                     Drugs of Abuse 1+ Panel,...[[id number]]                                                   Please view results for these tests on the individual orders.   Urine Drugs of Abuse Screen Panel 1 - Drug Screen (Full)     Status: None ()    Collection Time: 09/28/22 11:44 PM    Narrative    The following orders were created for panel order Urine Drugs of Abuse Screen Panel 1 - Drug Screen (Full).  Procedure                               Abnormality         Status                     ---------                               -----------         ------                     Drugs of Abuse 1 Panel, ...[[id number]]                                                   Please view results for these tests on the individual orders.   Drug Abuse Screen 77 with Reflex to confirmatory     Status: Normal    Collection Time: 09/29/22 12:39 AM   Result Value Ref Range    Amphetamines Urine Screen Negative Screen Negative    Barbituates  "Urine Screen Negative Screen Negative    Benzodiazepine Urine Screen Negative Screen Negative    Cannabinoids Urine Screen Negative Screen Negative    Cocaine Urine Screen Negative Screen Negative    Opiates Urine Screen Negative Screen Negative    PCP Urine Screen Negative Screen Negative         ED Meds:  Medications   melatonin tablet 1 mg (1 mg Oral Given 9/28/22 6653)     No orders to display       ED Course:  ED Course as of 09/29/22 0327   Wed Sep 28, 2022   2109 Patient is a 42-year-old male with a history of OCD, autism spectrum disorder, here from home with multiple complaints.  Patient is concerned that he has a buildup of medications in his body which is causing side effects.  States has been having vivid dreams, constipation or bowel movement issues as well as palpitations and thinks that this is from his clomipramine.  States he has not had any blood work done to reevaluate his heart disease states \"I did look at my heart rhythm on clinic for me.\"  Denies any SI or HI.  States he has also hallucinations.  States he like to be admitted for medication adjustment/washout.  Denies any drug or alcohol.  Plan for labs EtOH level, U tox and crisis evaluation   2139 EKG is sinus tachycardia rate 102, no signs acute ischemia, no inversions or depressions .  Essentially unchanged previous EKG dated September 28, 2022.     Patient signed out pending DEC assessment.  Patient here for possible medication side effects.  Patient also could be concerning for mental health decompensation.  Patient does have insight into his OCD and anxiety.  He has been taking medications appropriately and has not stopped them.  Patient concerned because he is having some vivid dreams and hallucinations which he had discussed with the pharmacist and they were concerned about this might be an effect from his medications.  As well he reports that he has extreme fatigue and lethargy especially in the morning related to starting the " Pristiq.  Patient has missed or had cancellations on the providers prior to try to get into see his .  He was seen by DEC and at this time is not psychotic or acutely suicidal.  He also has a fear of possible and on discussing with them does not necessarily wish to be admitted but had hoped to try to wean quickly off his medications.  This is the reason he did come to the ER but on discussion of admission when I spoke to him again he is hesitant to proceed with admission and would be voluntary at this time given his lack of holdable diagnosis currently.  He is able to manage the symptoms with medications and has been continue to take them.  I would recommend that he calls his  office in the morning and request a call back from the provider.  He also has a follow-up visit in 6 days with that provider and can discuss adjusting medications at that time.  At this time I would recommend weaning off of medications rather than stopping them cold turkey and patient understands that he cannot to stop them but would rather follow-up with his  to start weaning them especially one at a time.  Patient was agreeable to this as well DEC recommended outpatient resources for establishing psychiatric care which they will put him in for referral for more urgent visit.  Patient is agreeable to this and referral will be made in the morning.  He is comfortable with plan to discharge and on further discussion does not wish to be admitted due to fear of hospital and reports feeling more anxious in the room.  He would rather go home and I feel this would be appropriate but unfortunately I am not able to give him immediate go ahead to stop his medications but rather he will need to follow-up and continue with outpatient services to start to decrease them based on the adverse reactions with the clomipramine and Pristiq mainly.    Impression:    ICD-10-CM    1. Adverse effect of drug, initial encounter  T50.905A    2. Hallucinations   R44.3        Plan:    1. Discharged.    Glenroy Guaman MD  [facility] EMERGENCY DEPARTMENT  [facility] [address] [phone]                  Glenroy Guaman MD  09/29/22 0327

## 2022-09-29 NOTE — ED TRIAGE NOTES
The pt arrives from home by EMS with c/o hallucinations that have been going on since January. Has a hx of autism and other mental health issues that he takes several mediations for. He states that he has been seeing things on his walls and hearing voices. Denies suicidal or homicidal ideations.      Triage Assessment     Row Name 09/28/22 2026       Triage Assessment (Adult)    Airway WDL WDL       Cognitive/Neuro/Behavioral WDL    Cognitive/Neuro/Behavioral WDL mood/behavior;motor response    Mood/Behavior excitable;restless

## 2022-09-29 NOTE — ED PROVIDER NOTES
"  Emergency Department Encounter         FINAL IMPRESSION:  Medication reaction, anxiety        ED COURSE AND MEDICAL DECISION MAKING       ED Course as of 09/29/22 0008   Wed Sep 28, 2022   2109 Patient is a 42-year-old male with a history of OCD, autism spectrum disorder, here from home with multiple complaints.  Patient is concerned that he has a buildup of medications in his body which is causing side effects.  States has been having vivid dreams, constipation or bowel movement issues as well as palpitations and thinks that this is from his clomipramine.  States he has not had any blood work done to reevaluate his heart disease states \"I did look at my heart rhythm on clinic for me.\"  Denies any SI or HI.  States he has also hallucinations.  States he like to be admitted for medication adjustment/washout.  Denies any drug or alcohol.  Plan for labs EtOH level, U tox and crisis evaluation   2139 EKG is sinus tachycardia rate 102, no signs acute ischemia, no inversions or depressions .  Essentially unchanged previous EKG dated September 28, 2022.     -Patient signed out to oncoming physician.  Think at this point if he is cleared by crisis, I think it is okay for him to go home.  He is voluntary at this point stating he would like to be admitted for further medication management.  I do not necessarily think he has \"medication that is building up\" in his system and I am concerned that maybe his anxiety/depression/OCD is getting worse.  Nonetheless, he has no suicidal or homicidal ideations, significant hallucinations at be concerning for decompensation as an outpatient or any other holdable psychiatric presentations.      9:01 PM I introduced myself to the patient, obtained patient history, performed a physical exam, and discussed plan for ED workup including potential diagnostic laboratory/imaging studies and interventions.  12:06 AM Spoke with RN, who talked with patient's mother. She is upset that the " "patient is at the ER and trying to voluntarily admit himself.                  At the conclusion of the encounter I discussed the results of all the tests and the disposition. The questions were answered. The patient or family acknowledged understanding and was agreeable with the care plan.                  MEDICATIONS GIVEN IN THE EMERGENCY DEPARTMENT:  Medications   melatonin tablet 1 mg (1 mg Oral Given 9/28/22 6252)       NEW PRESCRIPTIONS STARTED AT TODAY'S ED VISIT:  New Prescriptions    No medications on file       HPI   Triage Note  2027 The pt arrives from home by EMS with c/o hallucinations that have been going on since January. Has a hx of autism and other mental health issues that he takes several mediations for. He states that he has been seeing things on his walls and hearing voices. Denies suicidal or homicidal ideations.        Patient information obtained from: patient     Use of Interpretor: N/A     Glenroy Adler is a 42 year old male with a pertinent history of autism spectrum disorder, PTSD, depressive disorder, anxiety, essential tremor, OCD who presents to this ED by EMS for evaluation of hallucinations.     Patient reports working with mental health clinic since January regarding getting prescribed the right medications for his hallucinations. He reports building a resistance to the medications that he is currently prescribes, and that \"they only give me side effects\". He notes feeling \"back logged\" (constipated), as well as experiencing night terrors and very vivid, strange dreams. Recently, he woke up after 7 nightmares in one night, breathing very hard. He reports having bruises on his chest and scrapes on his body that he finds after these nights. Patient also notes constant hallucinations on the walls, as well as random thoughts such as thinking that his mom is coming to drop off a $200 gift card. He denies thoughts of hurting self or others.     The patient reports that his " ultimate goal is to get off of his current medications.     He notes having a vitamin D deficiency.         REVIEW OF SYSTEMS:  Review of Systems   Constitutional: Negative for fever, malaise  HEENT: Negative runny nose, sore throat, ear pain, neck pain  Respiratory: Negative for shortness of breath, cough, congestion  Cardiovascular: Negative for chest pain, leg edema  Gastrointestinal: Negative for abdominal distention, abdominal pain, constipation, vomiting, nausea, diarrhea  Genitourinary: Negative for dysuria and hematuria.   Integument: Negative for rash, skin breakdown  Neurological: Negative for paresthesias, weakness, headache.  Musculoskeletal: Negative for joint pain, joint swelling  Psychiatric: Positive for hallucinations. Negative for suicidal/homicidal ideations.     All other systems reviewed and are negative.          MEDICAL HISTORY     Past Medical History:   Diagnosis Date     Anxiety      Autism      Depressive disorder      Essential tremor        No past surgical history on file.    Social History     Tobacco Use     Smoking status: Never Smoker     Smokeless tobacco: Never Used   Substance Use Topics     Alcohol use: Never     Drug use: Never       Baclofen (LIORESAL) 5 MG tablet  citalopram (CELEXA) 20 MG tablet  clomiPRAMINE (ANAFRANIL) 50 MG capsule  clomiPRAMINE (ANAFRANIL) 50 MG capsule  desvenlafaxine (PRISTIQ) 50 MG 24 hr tablet  DIAZEPAM PO  guanFACINE (TENEX) 1 MG tablet  LORazepam (ATIVAN) 1 MG tablet  LORAZEPAM PO  melatonin 5 MG tablet  MELATONIN PO  prazosin (MINIPRESS) 1 MG capsule  Vitamin D3 (CHOLECALCIFEROL) 25 mcg (1000 units) tablet            PHYSICAL EXAM     BP (!) 133/94   Pulse (!) 124   Temp 98.3  F (36.8  C) (Oral)   Resp 20   SpO2 98%       PHYSICAL EXAM:     General: Patient appears well, nontoxic, comfortable  HEENT: Moist mucous membranes,  No head trauma.  No midline neck pain.  Cardiovascular: Normal rate, normal rhythm, no extremity edema.  No appreciable  "murmur.  Respiratory: No signs of respiratory distress, lungs are clear to auscultation bilaterally with no wheezes rhonchi or rales.  Abdominal: Soft, nontender, nondistended, no palpable masses, no guarding, no rebound  Musculoskeletal: Full range of motion of joints, no deformities appreciated.  Neurological: Alert and oriented, grossly neurologically intact.  Psychological: Normal affect and mood.  Mildly anxious.  No depression.  No SI or HI.  States he some point sees \"squares moving across the room.\"  Integument: No rashes appreciated          RESULTS       Labs Ordered and Resulted from Time of ED Arrival to Time of ED Departure   COMPREHENSIVE METABOLIC PANEL - Abnormal       Result Value    Sodium 142      Potassium 3.8      Chloride 101      Carbon Dioxide (CO2) 26      Anion Gap 15      Urea Nitrogen 12.4      Creatinine 0.98      Calcium 9.7      Glucose 102 (*)     Alkaline Phosphatase 79      AST 21      ALT 27      Protein Total 7.8      Albumin 5.0      Bilirubin Total 0.3      GFR Estimate >90     ETHYL ALCOHOL LEVEL - Abnormal    Alcohol ethyl <0.01 (*)    CBC WITH PLATELETS AND DIFFERENTIAL - Abnormal    WBC Count 11.0      RBC Count 6.07 (*)     Hemoglobin 17.8 (*)     Hematocrit 50.0      MCV 82      MCH 29.3      MCHC 35.6      RDW 12.1      Platelet Count 285      % Neutrophils 65      % Lymphocytes 23      % Monocytes 9      % Eosinophils 1      % Basophils 1      % Immature Granulocytes 1      NRBCs per 100 WBC 0      Absolute Neutrophils 7.4      Absolute Lymphocytes 2.5      Absolute Monocytes 1.0      Absolute Eosinophils 0.1      Absolute Basophils 0.1      Absolute Immature Granulocytes 0.1      Absolute NRBCs 0.0     TROPONIN T, HIGH SENSITIVITY - Normal    Troponin T, High Sensitivity 7     DRUG ABUSE SCREEN 77 WITH REFLEX TO CONFIRMATORY - Normal    Amphetamines Urine Screen Negative      Barbituates Urine Screen Negative      Benzodiazepine Urine Screen Negative      Cannabinoids " Urine Screen Negative      Cocaine Urine Screen Negative      Opiates Urine Screen Negative      PCP Urine Screen Negative         No orders to display                     PROCEDURES:  Procedures:  Procedures       I, Jim Anderson am serving as a scribe to document services personally performed by Kapil Thurston DO, based on my observations and the provider's statements to me.  I, Kapil Thurston DO, attest that Jim Monica is acting in a scribe capacity, has observed my performance of the services and has documented them in accordance with my direction.    Kapil Thurston DO  Emergency Medicine  [facility] EMERGENCY DEPARTMENT       Kapil Thurston DO  09/29/22 6307

## 2022-09-29 NOTE — ED NOTES
Writer had message to call patient's mother. Phone rang but no one answered.  Also tried to call patient on both of his phones but neither were working.

## 2022-09-29 NOTE — ED NOTES
The following information was received from mother, name and contact info is in Epic. Information was obtained via phone.     What happened today: Mother feels that patient's medications are off, and that the pristiq initiated several months ago has caused more side effects than benefits.     What is different about patient's functioning: Patient cannot function in the AM-early evening due to lethargy and paranoia    Concern about alcohol/drug use: No    What do you think the patient needs: Revisit medications as patient has not been able to meet with prescriber since April 2022 due to her cancelling three appts, per mother's report    Has patient made comments about wanting to kill themselves/others:  No    If d/c is recommended, can they take part in safety/aftercare planning: Yes possibly    Other information: Patient has autism, and has struggled with metabolism in relation to his medications historically.

## 2022-09-29 NOTE — CONSULTS
Diagnostic Evaluation Consultation  Crisis Assessment    Patient was assessed: Vivienne  Patient location: [place]  Was a release of information signed: No. Reason: declined      Referral Data and Chief Complaint  Glenroy is a 42 year old, who uses he/him pronouns, and presents to the ED via EMS. Patient is referred to the ED by self. Patient is presenting to the ED for the following concerns: pt presents for medication side effects.      Informed Consent and Assessment Methods     Patient is his own guardian. Writer met with patient and explained the crisis assessment process, including applicable information disclosures and limits to confidentiality, assessed understanding of the process, and obtained consent to proceed with the assessment. Patient was observed to be able to participate in the assessment as evidenced by alert and oriented. Assessment methods included conducting a formal interview with patient, review of medical records, collaboration with medical staff, and obtaining relevant collateral information from family and community providers when available..     Over the course of this crisis assessment provided reassurance, offered validation, engaged patient in problem solving and disposition planning, worked with patient on safety and aftercare planning, worked with patient on brief, therapeutic activity: grounding skills, assisted in processing patient's thoughts and feeling relating to feeling anxious and provided psychoeducation. Patient's response to interventions was pt appears willing and open to interventions.     Summary of Patient Situation     Pt presents to the ED for medication side effects. Pt reports that his medications not been working for him for the past few months, but reports that his mental health symptoms have significantly decreased, so he is okay that they stopped working. However, pt reports he is experiencing some side effects of hallucinations (this was told to him by the  "pharmacist, that a side effect of his meds are hallucinations). Pt reports that he has also been struggling with some \"dream stuff\". Pt describes that his dreams have become very vivid and realistic, which is create issues for him during the day; \"I find it difficult to think straight, my body aches, my memory is poor, and I feel tired\". Pt reports that he has been working with his provider to manage his medications, but feels like he has not been heard by her and reports that the provider has canceled his last 3 appointments. Pt reports that the hallucinations occur all day long, \"I see all sorts of thing; guinea pigs, objects moving, etc.\". Pt describes these hallucinations as primarily visual, however, reports he has heard voices calling his name, \"which is not real, because I live alone\". Pt denies SI/SIB/SA/HI and denies plans, means, or intent to harm himself or others. Pt reports he has an appointment on Wednesday, October 5th, 2022, but feels his hallucinations are upsetting him. Pt reports these hallucinations are not commanding and states he is fearful of hospitals and does not want to be admitted. Pt presents as anxious, alert, oriented, and engaged. Pt presents as hyperverbal. Pt appears to be functioning slightly below his baseline.    Brief Psychosocial History     Pt reports he lives alone in an apartment. Pt denies education involvement, legal issues, spiritual/cultural concerns, and  status. Pt reports he is disabled due to his Autism and has a chronic medical condition (similar to Parkinsons) that causes him to have a tremor. Pt reports that he is close with his family and identifies his mother and [employer] worker as his largest support.    Significant Clinical History     Pt reports history of Autism and OCD. Pt denies family history of mental health, trauma, and substance use/abuse issues. Pt reports history of hospitalizations, over 2 years ago, for mental health. Pt reports history of DBT and " "current involvement in in-home therapy and medication management.     Collateral Information  Collateral collected by another DEC :     \"The following information was received from mother, name and contact info is in Epic. Information was obtained via phone.      What happened today: Mother feels that patient's medications are off, and that the pristiq initiated several months ago has caused more side effects than benefits.      What is different about patient's functioning: Patient cannot function in the AM-early evening due to lethargy and paranoia     Concern about alcohol/drug use: No     What do you think the patient needs: Revisit medications as patient has not been able to meet with prescriber since April 2022 due to her cancelling three appts, per mother's report     Has patient made comments about wanting to kill themselves/others:  No     If d/c is recommended, can they take part in safety/aftercare planning: Yes possibly     Other information: Patient has autism, and has struggled with metabolism in relation to his medications historically. \"         Risk Assessment  ESS-6  1.a. Over the past 2 weeks, have you had thoughts of killing yourself? No  1.b. Have you ever attempted to kill yourself and, if yes, when did this last happen? No   2. Recent or current suicide plan? No   3. Recent or current intent to act on ideation? No  4. Lifetime psychiatric hospitalization? Yes  5. Pattern of excessive substance use? No  6. Current irritability, agitation, or aggression? No  Scoring note: BOTH 1a and 1b must be yes for it to score 1 point, if both are not yes it is zero. All others are 1 point per number. If all questions 1a/1b - 6 are no, risk is negligible. If one of 1a/1b is yes, then risk is mild. If either question 2 or 3, but not both, is yes, then risk is automatically moderate regardless of total score. If both 2 and 3 are yes, risk is automatically high regardless of total score.      Score: 1, " mild risk      Does the patient have access to lethal means? No     Does the patient engage in non-suicidal self-injurious behavior (NSSI/SIB)? no     Does the patient have thoughts of harming others? No     Is the patient engaging in sexually inappropriate behavior?  no        Current Substance Abuse     Is there recent substance abuse? no     Was a urine drug screen or blood alcohol level obtained: No       Mental Status Exam     Affect: Appropriate   Appearance: Appropriate    Attention Span/Concentration: Attentive  Eye Contact: Engaged   Fund of Knowledge: Appropriate    Language /Speech Content: Fluent   Language /Speech Volume: Normal    Language /Speech Rate/Productions: Hyperverbal    Recent Memory: Intact   Remote Memory: Intact   Mood: Anxious    Orientation to Person: Yes    Orientation to Place: Yes   Orientation to Time of Day: Yes    Orientation to Date: Yes    Situation (Do they understand why they are here?): Yes    Psychomotor Behavior: Normal    Thought Content: Hallucinations   Thought Form: Intact      History of commitment: No       Medication    Psychotropic medications: Yes. Pt is currently taking see HPI. Medication compliant: Yes. Recent medication changes: No  Medication changes made in the last two weeks: No       Current Care Team    Primary Care Provider: Yes  Psychiatrist: No  Therapist: Yes  : No     CTSS or ARMHS: No  ACT Team: No  Other: No      Diagnosis    Autism Spectrum Disorder 299.00(F84.0)  Associated Current severity for Criterion A and Criterion B: 299.00(F84.0)  With accompanying intellectual impairment,    300.3 (F42) Obsessive Compulsive Disorder - by history     Clinical Summary and Substantiation of Recommendations    After therapeutic assessment, intervention and aftercare planning by ED care team and LM and in consultation with attending provider, the patient's circumstances and mental state were appropriate for outpatient management. It is the  recommendation of this clinician that pt discharge with OP MH support. A this time the pt is not presenting as an acute risk to self or others due to the following factors: Pt denies SI/SIB/SA/HI and denies plans, means, or intent to harm himself or others. Pt reports experiencing non-commanding visual hallucinations due to a side effect of his medications. Pt reports he has an appointment with his provider next Wednesday to discuss weaning off of his meds. [facility] coordinator can offer a new psychiatrist, if pt wants to be scheduled with a psychiatrist, not his primary care provider.     Disposition    Recommended disposition: Medication Management       Reviewed case and recommendations with attending provider. Attending Name: Dr. Guaman       Attending concurs with disposition: Yes       Patient concurs with disposition: Yes       Guardian concurs with disposition: NA      Final disposition: Medication management.       Outpatient Details (if applicable):   Aftercare plan and appointments placed in the AVS and provided to patient: Yes. Given to patient by RN    Was lethal means counseling provided as a part of aftercare planning? No       Assessment Details    Patient interview started at: 1:44AM and completed at: 2:14AM.     Total duration spent on the patient case in minutes: .75 hrs      CPT code(s) utilized: 04715 - Psychotherapy for Crisis - 60 (30-74*) min       MATEO Koch, MS, [facility]  DEC - Triage & Transition Services  Callback: [phone]      Aftercare Plan    Call psychiatrist to have medications adjusted over the phone. You can do this prior to your appointment, so you can start weaning off of you medications earlier.    If I am feeling unsafe or I am in a crisis, I will:   Contact my established care providers   Call the National Suicide Prevention Lifeline: 404.237.2187   Go to the nearest emergency room   Call [phone]     Warning signs that I or other people might notice when a crisis is  developing for me:     I am having increasing suicidal thoughts that turn to plans with intent or means  I am having additional urges to self-harm    My emotions are of hopelessness; feeling like there's no way out.  Rage or anger.  Engaging in risky activities without thinking  Withdrawing from family/friends  Dramatic mood swings  Drastic personality changes   Use of alcohol or drugs  Postings on social media  Neglect of personal hygiene or cares     Things I am able to do on my own to cope or help me feel better:    Spending quality time with loved ones  Staying hydrated  Eating balanced meals  Going for a walk every day  Take care of daily responsibilities/needs  Focus on positive self-talk vs negative self-talk    Things that I am able to do with others to cope or help me better:   Exercise  Music  Deep breathing  Meditations  Journal  Self-regulate  Self check-in  Ask for help    Things I can use or do for distraction:   Reach out to/spend time with family, friends  Shower  Exercise  Chores or do a project  Listen to music  Watch movie/TV  Listening to music  Journaling  Reading a book  Meditating  Call a friend    Changes I can make to support my mental health and wellness:    -I will abstain from all mood altering chemicals not currently prescribed to me   -I will attend scheduled mental health therapy and psychiatric appointments and follow all   recommendations  -I will commit to 30 minutes of self care daily - this can be as simple as taking a shower, going for a   walk, cooking a meal, read, writing, etc  -I will practice square breathing when I begin to feel anxious - in breath through the nose for the count   of 4 and the first line on the square. Out breath through the mouth for the count of 4 for the second line   of the square. Repeat to complete the square. Repeat the square as many times as needed.  - I will use distraction skills of: going for walks, watching TV, spending time outside, calling a  friend or   family member  -Use community resources, including hotline numbers, [facility] crisis and support meetings  -Maintain a daily schedule/routine  -Practice deep breathing skills  -Download a meditation ish and spend 15-20 minutes per day mediating/relaxing. Some apps to   download include: Calm, Headspace and Insight Timer. All 3 of these apps have free version    Reduce Extreme Emotion  QUICKLY:  Changing Your Body Chemistry      T:  Change your body Temperature to change your autonomic nervous system     Use Ice Water to calm yourself down FAST     Put your face in a bowl of ice water (this is the best way; have the person keep his/her face in ice water for 30-45 seconds - initial research is showing that the longer s/he can hold her/his face in the water, the better the response), or     Splash ice water on your face, or hold an ice pack on your face      I:  Intensely exercise to calm down a body revved up by emotion     Examples: running, walking fast, jumping, playing basketball, weight lifting, swimming, calisthenics, etc.     Engage in exercises that DO NOT include violent behaviors. Exercises that utilize violent behaviors tend to function as  behavioral rehearsal,  and rather than calming the person down, may actually  rev  the person up more, increasing the likelihood of violence, and lessening the likelihood that they will  burn off  energy     P:  Progressively relax your muscles     Starting with your hands, moving to your forearms, upper arms, shoulders, neck, forehead, eyes, cheeks and lips, tongue and teeth, chest, upper back, stomach, buttocks, thighs, calves, ankles, feet     Tense (10 seconds,   of the way), then relax each muscle (all the way)     Notice the tension     Notice the difference when relaxed (by tensing first, and then relaxing, you are able to get a more thorough relaxation than by simply relaxing)      P: Paced breathing to relax     The standard technique is to begin with  "counting the number of steps one takes for a typical inhale, then counting the steps one takes for a typical exhale, and then lengthening the amount of steps for the exhalation by one or two steps.  OR    Repeat this pattern for 1-2 minutes    Inhale for four (4) seconds     Exhale for six (6) to eight (8) seconds     Research demonstrated that one can change one's overall level of anxiety by doing this exercise for even a few minutes per day      People in my life that I can ask for help:   Family  Friends  Providers    Your Community Health has a mental health crisis team you can call 24/7:   [facility] Mental Health Crisis: [phone] - Call the crisis line for immediate mental health support, 24 hours a day.     Other things that are important when I'm in crisis:   Ask for help    Additional resources and information:     Mental Health Apps  My3  https://IV Diagnostics.viavoo/    VirtualHopeBox  https://AgeCheq/apps/virtual-hope-box/       Professionals or Agencies I Can Contact During A Crisis:       Crisis Lines  Crisis Text Line  Text 635012  You will be connected with a trained live crisis counselor to provide support.    [employer] (LGBTQ Youth Crisis Line)  3.562.808.9100  text START to 624-287    National [place] on Mental Illness (AMADA)  723.793.0058 or 2.888.AMADA.HELPS    National Suicide Prevention Lifeline at 2-276-319-MAVM (6208)     Throughout  [place]: call **CRISIS (**915248)     Crisis Text Line: is available for free, 24/7 by texting [place] to 913602    Community Resources  Fast Tracker  Linking people to mental health and substance use disorder resources  fasttrackermn.org     [place] Mental Health Warm Line  Peer to peer support  Monday thru Saturday, 12 pm to 10 pm  627.846.8771 or 5.954.550.7159  Text \"Support\" to 50713     National [place] on Mental Illness (www.[place].amada.org): 441.704.8522 or 594-401-2081     [facility] Phone (free remote counseling): " [phone] Web address:   https://Questra.org/     [email or web address] (filter for insurance, gender preference, etc.)    [facility]   [phone]  Intake appointment will be virtual, following appointments can be in person or virtual.   **IMMEDIATE OPENINGS**    [facility]  [phone]  *offers individual therapy, medication management and Mental Health Case Workers; can self refer    [facility]  [phone]  *Immediate Openings    Please follow up with scheduled providers to ensure all necessary paperwork is filled out prior to your   scheduled telehealth appointments.     Coordinators from [facility] will be calling within two business days to ensure   that you have the resources you may need or provide assistance with scheduling (Phone number: [phone].).    Remember: give the referrals 3 sessions prior to calling it quits. Do you trust them? Do you feel   understood? Do you think they can help? Check in with yourself after each session    Please reach out to the [facility]([phone]) regarding further mental health appointment needs for this emergency department visit.    [facility] SCHEDULING:  Today you were seen by a licensed mental health professional through [facility] Transition sevices, [facility] ([facility])  for a crisis assessment in the Emergency Department at [facility].  It is recommended that you follow up with your estabished providers (psychiatrist, menta health therapist, and/or primary care doctor - as relevant) as soon as possible. Coordinators from [facility] will be calling you in the next 24-48 hours to ensure that you have the resources you need.  You can so contact [facility] coordinators directly at [phone].     [facility] maintains an extensive network of licensed behavioral health providers to connect patients with the services they need.  We do not charge providers a fee to participate in our  referral network.  We match patients with providers based on a patient s specific needs, insurance coverage, and location.  Our first effort will be to refer you to a provider within your care system, and will utilize providers outside your care system as needed.     Additional information  Today you were seen by a licensed mental health professional through Triage and Transition services, [facility] ([facility])  for a crisis assessment in the Emergency Department at [facility].  It is recommended that you follow up with your established providers (psychiatrist, mental health therapist, and/or primary care doctor - as relevant) as soon as possible. Coordinators from [facility] will be calling you in the next 24-48 hours to ensure that you have the resources you need.  You can also contact [facility] coordinators directly at [phone]. You may have been scheduled for or offered an appointment with a mental health provider. [facility] maintains an extensive network of licensed behavioral health providers to connect patients with the services they need.  We do not charge providers a fee to participate in our referral network.  We match patients with providers based on a patient's specific needs, insurance coverage, and location.  Our first effort will be to refer you to a provider within your care system, and will utilize providers outside your care system as needed.

## 2022-11-23 NOTE — DISCHARGE INSTRUCTIONS
Behavioral Discharge Planning and Instructions    Summary: You were admitted on 7/1/2021  due to Obsessive Compulsive Disorder (OCD), Autism Spectrum Disorder (ASD) and Suicidal Ideations.  You were treated by Dr. Roche and discharged on 07/20/2021 from [facility] to with a new behavorial health and care agency [facility]    Main Diagnosis: OCD    Health Care Follow-up:   Appointments prescheduled: scheduled, explain Patient has existing ongoing established psychiatry with Rosanna dougherty nurse practitioner at [facility]. At this time Family and patient may transition their care to a psychiatrist at discharge. Mother has appointments scheduled.    Attend all scheduled appointments with your outpatient providers. Call at least 24 hours in advance if you need to reschedule an appointment to ensure continued access to your outpatient providers.     Major Treatments, Procedures and Findings:  You were provided with: assessed for medical stability, medication evaluation and/or management, group therapy, family therapy, individual therapy, milieu management and medical interventions    Symptoms to Report: Concerns about increased sweating. Increased anxiety due to new service provider    Early warning signs can include: increased depression or anxiety    Safety and Wellness:  Take all medicines as directed.  Make no changes unless your doctor suggests them.      Follow treatment recommendations.  Refrain from alcohol and non-prescribed drugs.  If there is a concern for safety, call 911.    Resources:   Crisis Intervention: 338.886.8233 or 308-705-3193 (TTY: 937.688.8139).  Call anytime for help.  [facility] Crisis Response - Adult [phone]  Crisis Intervention: 372.453.8334 or 362-330-8054. Call anytime for help.     General Medication Instructions:   See your medication sheet(s) for instructions.   Take all medicines as directed.  Make no changes unless your doctor suggests them.   Go to all your  doctor visits.  Be sure to have all your required lab tests. This way, your medicines can be refilled on time.  Do not use any drugs not prescribed by your doctor.  Avoid alcohol.    The Treatment team has appreciated the opportunity to work with you. If you have any questions or concerns about your recent admission, you can contact the unit which can receive your call 24 hours a day, 7 days a week. They will be able to get in touch with a Provider if needed. The unit number is  .    Medical Follow-up appointment: Dr.Alison Rondon on Thursday July 22 at 11:30 a.m..   Address: [address] [place] [address]  Phone: [phone]   Bring the following to your appointment: all medications, photo id, insurance card, co-pay (if applicable) and discharge paper work from hospital to appt. If you need to change or cancel appt, please call your clinic.     Yexi

## 2023-09-18 ENCOUNTER — HOSPITAL ENCOUNTER (EMERGENCY)
Facility: HOSPITAL | Age: 43
Discharge: HOME | End: 2023-09-19
Attending: EMERGENCY MEDICINE | Admitting: EMERGENCY MEDICINE
Payer: MEDICARE

## 2023-09-18 ENCOUNTER — TELEPHONE (OUTPATIENT)
Dept: BEHAVIORAL HEALTH | Facility: CLINIC | Age: 43
End: 2023-09-18

## 2023-09-18 DIAGNOSIS — F41.9 ANXIETY: ICD-10-CM

## 2023-09-18 DIAGNOSIS — R42 VERTIGO: ICD-10-CM

## 2023-09-18 PROBLEM — F31.10 BIPOLAR I DISORDER, MOST RECENT EPISODE MANIC (H): Status: ACTIVE | Noted: 2023-09-18

## 2023-09-18 LAB
ALBUMIN SERPL BCG-MCNC: 4.8 G/DL (ref 3.5–5.2)
ALP SERPL-CCNC: 75 U/L (ref 40–129)
ALT SERPL W P-5'-P-CCNC: 63 U/L (ref 0–70)
AMPHETAMINES UR QL SCN: NORMAL
ANION GAP SERPL CALCULATED.3IONS-SCNC: 13 MMOL/L (ref 7–15)
AST SERPL W P-5'-P-CCNC: 41 U/L (ref 0–45)
BARBITURATES UR QL SCN: NORMAL
BASOPHILS # BLD AUTO: 0.1 10E3/UL (ref 0–0.2)
BASOPHILS NFR BLD AUTO: 0 %
BENZODIAZ UR QL SCN: NORMAL
BILIRUB SERPL-MCNC: 0.6 MG/DL
BUN SERPL-MCNC: 11.5 MG/DL (ref 6–20)
BZE UR QL SCN: NORMAL
CALCIUM SERPL-MCNC: 9.9 MG/DL (ref 8.6–10)
CANNABINOIDS UR QL SCN: NORMAL
CHLORIDE SERPL-SCNC: 100 MMOL/L (ref 98–107)
CREAT SERPL-MCNC: 0.77 MG/DL (ref 0.67–1.17)
DEPRECATED HCO3 PLAS-SCNC: 24 MMOL/L (ref 22–29)
EGFRCR SERPLBLD CKD-EPI 2021: >90 ML/MIN/1.73M2
EOSINOPHIL # BLD AUTO: 0 10E3/UL (ref 0–0.7)
EOSINOPHIL NFR BLD AUTO: 0 %
ERYTHROCYTE [DISTWIDTH] IN BLOOD BY AUTOMATED COUNT: 12.5 % (ref 10–15)
ETHANOL SERPL-MCNC: <0.01 G/DL
FENTANYL UR QL: NORMAL
GLUCOSE SERPL-MCNC: 108 MG/DL (ref 70–99)
HCT VFR BLD AUTO: 47.9 % (ref 40–53)
HGB BLD-MCNC: 17.4 G/DL (ref 13.3–17.7)
IMM GRANULOCYTES # BLD: 0 10E3/UL
IMM GRANULOCYTES NFR BLD: 0 %
LYMPHOCYTES # BLD AUTO: 2.1 10E3/UL (ref 0.8–5.3)
LYMPHOCYTES NFR BLD AUTO: 18 %
MAGNESIUM SERPL-MCNC: 2 MG/DL (ref 1.7–2.3)
MCH RBC QN AUTO: 29 PG (ref 26.5–33)
MCHC RBC AUTO-ENTMCNC: 36.3 G/DL (ref 31.5–36.5)
MCV RBC AUTO: 80 FL (ref 78–100)
MONOCYTES # BLD AUTO: 0.8 10E3/UL (ref 0–1.3)
MONOCYTES NFR BLD AUTO: 7 %
NEUTROPHILS # BLD AUTO: 8.7 10E3/UL (ref 1.6–8.3)
NEUTROPHILS NFR BLD AUTO: 75 %
NRBC # BLD AUTO: 0 10E3/UL
NRBC BLD AUTO-RTO: 0 /100
OPIATES UR QL SCN: NORMAL
PCP QUAL URINE (ROCHE): NORMAL
PLATELET # BLD AUTO: 258 10E3/UL (ref 150–450)
POTASSIUM SERPL-SCNC: 4.1 MMOL/L (ref 3.4–5.3)
PROT SERPL-MCNC: 7.8 G/DL (ref 6.4–8.3)
RBC # BLD AUTO: 5.99 10E6/UL (ref 4.4–5.9)
SODIUM SERPL-SCNC: 137 MMOL/L (ref 136–145)
TSH SERPL DL<=0.005 MIU/L-ACNC: 2.41 UIU/ML (ref 0.3–4.2)
WBC # BLD AUTO: 11.7 10E3/UL (ref 4–11)

## 2023-09-18 PROCEDURE — 85025 COMPLETE CBC W/AUTO DIFF WBC: CPT | Performed by: EMERGENCY MEDICINE

## 2023-09-18 PROCEDURE — 84443 ASSAY THYROID STIM HORMONE: CPT | Performed by: EMERGENCY MEDICINE

## 2023-09-18 PROCEDURE — 96361 HYDRATE IV INFUSION ADD-ON: CPT

## 2023-09-18 PROCEDURE — 90791 PSYCH DIAGNOSTIC EVALUATION: CPT

## 2023-09-18 PROCEDURE — 96365 THER/PROPH/DIAG IV INF INIT: CPT

## 2023-09-18 PROCEDURE — 96360 HYDRATION IV INFUSION INIT: CPT | Mod: 59

## 2023-09-18 PROCEDURE — 80053 COMPREHEN METABOLIC PANEL: CPT | Performed by: EMERGENCY MEDICINE

## 2023-09-18 PROCEDURE — 80307 DRUG TEST PRSMV CHEM ANLYZR: CPT | Performed by: EMERGENCY MEDICINE

## 2023-09-18 PROCEDURE — 250N000013 HC RX MED GY IP 250 OP 250 PS 637: Performed by: EMERGENCY MEDICINE

## 2023-09-18 PROCEDURE — 82077 ASSAY SPEC XCP UR&BREATH IA: CPT | Performed by: EMERGENCY MEDICINE

## 2023-09-18 PROCEDURE — 93005 ELECTROCARDIOGRAM TRACING: CPT | Performed by: EMERGENCY MEDICINE

## 2023-09-18 PROCEDURE — 99285 EMERGENCY DEPT VISIT HI MDM: CPT | Mod: 25

## 2023-09-18 PROCEDURE — 83735 ASSAY OF MAGNESIUM: CPT | Performed by: EMERGENCY MEDICINE

## 2023-09-18 PROCEDURE — 36415 COLL VENOUS BLD VENIPUNCTURE: CPT | Performed by: EMERGENCY MEDICINE

## 2023-09-18 RX ORDER — OLANZAPINE 5 MG/1
10 TABLET ORAL ONCE
Status: COMPLETED | OUTPATIENT
Start: 2023-09-18 | End: 2023-09-19

## 2023-09-18 RX ORDER — ACETAMINOPHEN 500 MG
500-1000 TABLET ORAL EVERY 6 HOURS PRN
COMMUNITY

## 2023-09-18 RX ORDER — LORAZEPAM 0.5 MG/1
1 TABLET ORAL ONCE
Status: COMPLETED | OUTPATIENT
Start: 2023-09-18 | End: 2023-09-18

## 2023-09-18 RX ORDER — LORAZEPAM 0.5 MG/1
1 TABLET ORAL EVERY 8 HOURS PRN
Status: DISCONTINUED | OUTPATIENT
Start: 2023-09-18 | End: 2023-09-19 | Stop reason: HOSPADM

## 2023-09-18 RX ORDER — HYDROXYZINE HYDROCHLORIDE 25 MG/1
25 TABLET, FILM COATED ORAL ONCE
Status: COMPLETED | OUTPATIENT
Start: 2023-09-18 | End: 2023-09-18

## 2023-09-18 RX ORDER — MAGNESIUM OXIDE 400 MG/1
400 TABLET ORAL DAILY
COMMUNITY

## 2023-09-18 RX ADMIN — LORAZEPAM 1 MG: 0.5 TABLET ORAL at 18:03

## 2023-09-18 RX ADMIN — LORAZEPAM 1 MG: 0.5 TABLET ORAL at 19:41

## 2023-09-18 RX ADMIN — HYDROXYZINE HYDROCHLORIDE 25 MG: 25 TABLET, FILM COATED ORAL at 19:41

## 2023-09-18 ASSESSMENT — ACTIVITIES OF DAILY LIVING (ADL)
ADLS_ACUITY_SCORE: 37

## 2023-09-18 NOTE — ED PROVIDER NOTES
"EMERGENCY DEPARTMENT ENCOUNTER      NAME: Glenroy Adler  AGE: 43 year old male  YOB: 1980  MRN: [id number]  EVALUATION DATE & TIME: No admission date for patient encounter.    PCP: Arabella Rondon    ED PROVIDER: Radha Bradley M.D.      Chief Complaint   Patient presents with    Dizziness    Anxiety         FINAL IMPRESSION:  1. Anxiety          ED COURSE & MEDICAL DECISION MAKING:    ED Course as of 09/18/23 2300   Mon Sep 18, 2023   1649 Patient with noted anxiety and pressured speech after coming off of his medications and increased hallucinations of geometric shapes and a guinea pig on his stomach which he notes is increasing his overall anxiety. Pt with , tangential speech, notes he does not have a PMD but DOES have a general practitioner, wants him to \"see a specialsit\" but hasn't seen a psychiatrist recently, no psychiatrist currently, amenable to DEC assessment for consideration of whether this may represent suzanne. He is amenable to speak with DEC . He does express this his goal is to see why he feels overall lightheaded, ROS negative overall.    1656 CBC and CMP WNL, magnesium WNL and EtOH negative, EKG WNL today. Pt medically cleared currently.   1842 Patient given 1mg ativan orally for comfort. Pt with elevated HR and BP while very anxious in ED with reassuring NSR on EKG and known severe anxiety, further ativan administered   1915 I spoke with DEC who notes patient does appear to be manic with pressured rapid speech and tangential content, no plans to harm self and no homicidal ideations, he denied auditory hallucinations or paranoia but did also admit visual hallucinations to DEC, was fixated on medical topics and noncompliant with his medications since March. DEC recommends place admit hold with 1:1 as patient has poor insight and he will call central intake for placement request and planned admission. RN and charge RN updated re: need for hold and 1:1   2227 VS " much improved after patient was able to calm in the ED and not drinking EtOH frequently, not at risk for alcohol withdrawal in the ED, medically cleared and on 72h hold, signed out to PM ED MD with patient on 1:1 and pending psychiatry admission, bed search in progress after seen by DEC, [facility] mental health order set ordered, no home meds to continue   6163 Patient and his mother were updated     4:46 PM Met with the patient and performed my initial exam.     Pertinent Labs & Imaging studies reviewed. (See chart for details)    N95 worn  A face shield was worn also  COVID PPE    Medical Decision Making    History:  Supplemental history from: Documented in chart, if applicable and Family Member/Significant Other  External Record(s) reviewed: Documented in chart, if applicable.    Work Up:  Chart documentation includes differential considered and any EKGs or imaging independently interpreted by provider, where specified.  In additional to work up documented, I considered the following work up: Documented in chart, if applicable.    External consultation:  Discussion of management with another provider: Documented in chart, if applicable    Complicating factors:  Care impacted by chronic illness: N/A  Care affected by social determinants of health: N/A    Disposition considerations: Admit.    Critical Care time was 35 minutes for this patient excluding procedures.      At the conclusion of the encounter I discussed the results of all of the tests and the disposition. The questions were answered. The patient or family acknowledged understanding and was agreeable with the care plan.     MEDICATIONS GIVEN IN THE EMERGENCY:  Medications   LORazepam (ATIVAN) tablet 1 mg (has no administration in time range)   OLANZapine (zyPREXA) tablet 10 mg (has no administration in time range)   melatonin tablet 10 mg (has no administration in time range)   LORazepam (ATIVAN) tablet 1 mg (1 mg Oral $Given 9/18/23 8238)   LORazepam  "(ATIVAN) tablet 1 mg (1 mg Oral $Given 9/18/23 1941)   hydrOXYzine (ATARAX) tablet 25 mg (25 mg Oral $Given 9/18/23 1941)   LORazepam (ATIVAN) tablet 1 mg (1 mg Oral $Given 9/18/23 1941)       NEW PRESCRIPTIONS STARTED AT TODAY'S ER VISIT  New Prescriptions    No medications on file          =================================================================    HPI      Glenroy Adler is a 43 year old male with PMHx of ASD, OCD, depressive disorder, and anxiety who presents to the ED today via ambulance with dizziness and anxiety.     Patient has been seeing hallucinations recently. He notes that yesterday night (around 7 pm), he went to sleep early, he was laying on his bed and started to see \"different\" 3-dimension geometric shapes and designs on his ceiling that was different from the shapes he saw last year when he was hallucinating. Mentions he started to feel really dizzy, lightheaded, and was nauseous so he went to the bathroom to vomit, but he didn't vomit. He notes that the \"ticking\" feeling on his eyes, he started to feel it on his body. Reports he was shaking and body felt disoriented.  Was half-awake and half-asleep and has trouble sleeping at times. He has been taking melatonin. He notes that he started to see hallucination in his stomach as well. He reports he has been having GI issues as well and doesn't know the reason. He doesn't think that he has psychological issues and that the mean issue is dizziness and sleep deprivation. Patient's mother is Cleo Adler, her number is [phone]. May contact her for additional information.   No other reported complaints or concerns at this time.       REVIEW OF SYSTEMS   All other systems reviewed and are negative except as noted above in HPI.    PAST MEDICAL HISTORY:  Past Medical History:   Diagnosis Date    Anxiety     Autism     Depressive disorder     Essential tremor        PAST SURGICAL HISTORY:  No past surgical history on file.    CURRENT " "MEDICATIONS:    acetaminophen (TYLENOL) 500 MG tablet  Baclofen (LIORESAL) 5 MG tablet  magnesium oxide (MAG-OX) 400 MG tablet  melatonin 5 MG tablet  MELATONIN PO  Vitamin D3 (CHOLECALCIFEROL) 25 mcg (1000 units) tablet        ALLERGIES:  Allergies   Allergen Reactions    Dust Mites     Metamucil [Fiber]     Smoke.        FAMILY HISTORY:  No family history on file.    SOCIAL HISTORY:   Social History     Socioeconomic History    Marital status: Single   Tobacco Use    Smoking status: Never    Smokeless tobacco: Never   Substance and Sexual Activity    Alcohol use: Never    Drug use: Never   Social History Narrative    ** Merged History Encounter **            VITALS:  Patient Vitals for the past 24 hrs:   BP Temp Pulse Resp SpO2 Height Weight   09/18/23 2152 131/73 -- 108 16 96 % -- --   09/18/23 2038 -- -- 115 18 96 % -- --   09/18/23 1900 (!) 162/111 -- (!) 135 -- 94 % -- --   09/18/23 1830 (!) 186/118 -- (!) 136 -- 96 % -- --   09/18/23 1800 (!) 202/96 -- (!) 141 -- 97 % -- --   09/18/23 1730 (!) 208/113 -- (!) 129 -- 98 % -- --   09/18/23 1433 (!) 173/120 99  F (37.2  C) 120 20 96 % 1.778 m (5' 10\") 113.9 kg (251 lb)       PHYSICAL EXAM    GENERAL: Awake, alert.  In no acute distress.   HEENT: Normocephalic, atraumatic.  Pupils equal, round and reactive.  Conjunctiva normal.  EOMI.  NECK: No stridor or apparent deformity.  PULMONARY: Symmetrical breath sounds without distress.  Lungs clear to auscultation bilaterally without wheezes, rhonchi or rales.  CARDIO: Regular rate and rhythm.  No significant murmur, rub or gallop.  Radial pulses strong and symmetrical.  ABDOMINAL: Abdomen soft, non-distended and non-tender to palpation.  No CVAT, no palpable hepatosplenomegaly.  EXTREMITIES: No lower extremity swelling or edema.    NEURO: Alert and oriented to person, place and time.  Cranial nerves grossly intact.  No focal motor deficit.  PSYCH: Tangential speech, anxious appearing, rapid speech  SKIN: No rashes    "   LAB:  All pertinent labs reviewed and interpreted.  Results for orders placed or performed during the hospital encounter of 09/18/23   Comprehensive metabolic panel   Result Value Ref Range    Sodium 137 136 - 145 mmol/L    Potassium 4.1 3.4 - 5.3 mmol/L    Chloride 100 98 - 107 mmol/L    Carbon Dioxide (CO2) 24 22 - 29 mmol/L    Anion Gap 13 7 - 15 mmol/L    Urea Nitrogen 11.5 6.0 - 20.0 mg/dL    Creatinine 0.77 0.67 - 1.17 mg/dL    Calcium 9.9 8.6 - 10.0 mg/dL    Glucose 108 (H) 70 - 99 mg/dL    Alkaline Phosphatase 75 40 - 129 U/L    AST 41 0 - 45 U/L    ALT 63 0 - 70 U/L    Protein Total 7.8 6.4 - 8.3 g/dL    Albumin 4.8 3.5 - 5.2 g/dL    Bilirubin Total 0.6 <=1.2 mg/dL    GFR Estimate >90 >60 mL/min/1.73m2   TSH with free T4 reflex   Result Value Ref Range    TSH 2.41 0.30 - 4.20 uIU/mL   Result Value Ref Range    Magnesium 2.0 1.7 - 2.3 mg/dL   Ethyl Alcohol Level   Result Value Ref Range    Alcohol ethyl <0.01 <=0.01 g/dL   CBC with platelets and differential   Result Value Ref Range    WBC Count 11.7 (H) 4.0 - 11.0 10e3/uL    RBC Count 5.99 (H) 4.40 - 5.90 10e6/uL    Hemoglobin 17.4 13.3 - 17.7 g/dL    Hematocrit 47.9 40.0 - 53.0 %    MCV 80 78 - 100 fL    MCH 29.0 26.5 - 33.0 pg    MCHC 36.3 31.5 - 36.5 g/dL    RDW 12.5 10.0 - 15.0 %    Platelet Count 258 150 - 450 10e3/uL    % Neutrophils 75 %    % Lymphocytes 18 %    % Monocytes 7 %    % Eosinophils 0 %    % Basophils 0 %    % Immature Granulocytes 0 %    NRBCs per 100 WBC 0 <1 /100    Absolute Neutrophils 8.7 (H) 1.6 - 8.3 10e3/uL    Absolute Lymphocytes 2.1 0.8 - 5.3 10e3/uL    Absolute Monocytes 0.8 0.0 - 1.3 10e3/uL    Absolute Eosinophils 0.0 0.0 - 0.7 10e3/uL    Absolute Basophils 0.1 0.0 - 0.2 10e3/uL    Absolute Immature Granulocytes 0.0 <=0.4 10e3/uL    Absolute NRBCs 0.0 10e3/uL   Drug Abuse Screen Qual Urine   Result Value Ref Range    Amphetamines Urine Screen Negative Screen Negative    Barbituates Urine Screen Negative Screen Negative     Benzodiazepine Urine Screen Negative Screen Negative    Cannabinoids Urine Screen Negative Screen Negative    Cocaine Urine Screen Negative Screen Negative    Fentanyl Qual Urine Screen Negative Screen Negative    Opiates Urine Screen Negative Screen Negative    PCP Urine Screen Negative Screen Negative         EKG:    Reviewed and interpreted as: Preformed at 9/18/23 15:56:04     Vent. Rate: 105 bpm     Impression: Sinus tachycardia, Otherwise normal EKG.     Comparison: similar to prior EKG from 9/18/2022      I have independently reviewed and interpreted the EKG(s) documented above.        I, Raisa Angulo, am serving as a scribe to document services personally performed by Dr. Radha Bradley based on my observation and the provider's statements to me. I, Radha Bradley MD attest that Raisa Angulo is acting in a scribe capacity, has observed my performance of the services and has documented them in accordance with my direction.       Radha Bradley MD  09/18/23 9782       Radha Bradley MD  09/18/23 230

## 2023-09-18 NOTE — ED TRIAGE NOTES
"Patient to triage via [facility]. Patient  states last night at 1900 he became lightheaded, shaking, \"I have been taken off 3 meds over the last year, I am sleep deprived, I tried to go to bed early last night and then I started feeling twitching in my eyes lightheaded, shaking, the twitching went away, but I still don't feel good. I am having hallucinations, probably because I am sleep deprived\" fast talking appears anxious.        "

## 2023-09-19 ENCOUNTER — TELEPHONE (OUTPATIENT)
Dept: BEHAVIORAL HEALTH | Facility: CLINIC | Age: 43
End: 2023-09-19
Payer: MEDICARE

## 2023-09-19 ENCOUNTER — APPOINTMENT (OUTPATIENT)
Dept: MRI IMAGING | Facility: HOSPITAL | Age: 43
End: 2023-09-19
Attending: EMERGENCY MEDICINE
Payer: MEDICARE

## 2023-09-19 ENCOUNTER — APPOINTMENT (OUTPATIENT)
Dept: RADIOLOGY | Facility: HOSPITAL | Age: 43
End: 2023-09-19
Attending: STUDENT IN AN ORGANIZED HEALTH CARE EDUCATION/TRAINING PROGRAM
Payer: MEDICARE

## 2023-09-19 VITALS
TEMPERATURE: 98.1 F | HEART RATE: 98 BPM | WEIGHT: 251 LBS | SYSTOLIC BLOOD PRESSURE: 165 MMHG | DIASTOLIC BLOOD PRESSURE: 86 MMHG | RESPIRATION RATE: 18 BRPM | OXYGEN SATURATION: 96 % | HEIGHT: 70 IN | BODY MASS INDEX: 35.93 KG/M2

## 2023-09-19 LAB — SARS-COV-2 RNA RESP QL NAA+PROBE: NEGATIVE

## 2023-09-19 PROCEDURE — 258N000003 HC RX IP 258 OP 636: Performed by: EMERGENCY MEDICINE

## 2023-09-19 PROCEDURE — 87635 SARS-COV-2 COVID-19 AMP PRB: CPT | Performed by: EMERGENCY MEDICINE

## 2023-09-19 PROCEDURE — 250N000013 HC RX MED GY IP 250 OP 250 PS 637: Performed by: EMERGENCY MEDICINE

## 2023-09-19 PROCEDURE — 70549 MR ANGIOGRAPH NECK W/O&W/DYE: CPT | Mod: MA

## 2023-09-19 PROCEDURE — 99284 EMERGENCY DEPT VISIT MOD MDM: CPT | Performed by: REGISTERED NURSE

## 2023-09-19 PROCEDURE — 70544 MR ANGIOGRAPHY HEAD W/O DYE: CPT | Mod: MA,XS

## 2023-09-19 PROCEDURE — A9585 GADOBUTROL INJECTION: HCPCS | Mod: JZ | Performed by: EMERGENCY MEDICINE

## 2023-09-19 PROCEDURE — 255N000002 HC RX 255 OP 636: Mod: JZ | Performed by: EMERGENCY MEDICINE

## 2023-09-19 PROCEDURE — 70553 MRI BRAIN STEM W/O & W/DYE: CPT | Mod: MA

## 2023-09-19 PROCEDURE — 73560 X-RAY EXAM OF KNEE 1 OR 2: CPT | Mod: 50

## 2023-09-19 RX ORDER — ACETAMINOPHEN 325 MG/1
975 TABLET ORAL EVERY 6 HOURS PRN
Status: DISCONTINUED | OUTPATIENT
Start: 2023-09-19 | End: 2023-09-19 | Stop reason: HOSPADM

## 2023-09-19 RX ORDER — GADOBUTROL 604.72 MG/ML
10 INJECTION INTRAVENOUS ONCE
Status: COMPLETED | OUTPATIENT
Start: 2023-09-19 | End: 2023-09-19

## 2023-09-19 RX ORDER — MECLIZINE HYDROCHLORIDE 25 MG/1
25 TABLET ORAL 3 TIMES DAILY PRN
Qty: 30 TABLET | Refills: 0 | Status: SHIPPED | OUTPATIENT
Start: 2023-09-19

## 2023-09-19 RX ORDER — CARBOXYMETHYLCELLULOSE SODIUM 5 MG/ML
1 SOLUTION/ DROPS OPHTHALMIC
Status: DISCONTINUED | OUTPATIENT
Start: 2023-09-19 | End: 2023-09-19 | Stop reason: HOSPADM

## 2023-09-19 RX ADMIN — ACETAMINOPHEN 975 MG: 325 TABLET ORAL at 08:01

## 2023-09-19 RX ADMIN — SODIUM CHLORIDE 1000 ML: 9 INJECTION, SOLUTION INTRAVENOUS at 11:40

## 2023-09-19 RX ADMIN — GADOBUTROL 10 ML: 604.72 INJECTION INTRAVENOUS at 14:42

## 2023-09-19 RX ADMIN — Medication 10 MG: at 00:40

## 2023-09-19 ASSESSMENT — ACTIVITIES OF DAILY LIVING (ADL)
ADLS_ACUITY_SCORE: 37

## 2023-09-19 NOTE — PROGRESS NOTES
"Triage & Transition Services, Extended Care     Glenroy Adler  September 19, 2023    Glenroy is followed related to Placement delay: patient was on a 72HH; however this was dropped due to lack of SI or HI . Please see initial DEC Crisis Assessment completed for complete assessment information. Medical record is reviewed.  While patient is in the ED, care team is working towards medical workup    There are significant status changes.     Writer received a call from Roseanne [phone] who states she is patients GLOG worker. Writer informed her he did not have an REMA and could not provide information to her, but could take information down. Debbi reports that patient has struggled with \"manic like speech\" for the entire time she has worked with this, and she feels this is related to an ASD diagnosis rather than bipolar. She states she has never hear patient make statements of wanting to harm himself or others. Additionally Roseanne identifies that patient is very concerned about his autism diagnosis often feeling that people do not understand it.  She states he has had services in the past, but often has had poor reactions to medications that have been related to mental health concerns, however she reports that they have spoken about getting him set up with a therapist who has knowledge about autism spectrum disorder.  Roseanne additionally reports the patient has had trauma in the past from hospital settings. Roseanne does state that patient does utilize her as well as his mother for support.    Writer also spoke to UNIQUE Olivo about patient as there was a psych consult order placed.  She did relate that she does not believe patient needs inpatient mental health placement as he is denying SI and HI    Plan:  Discharge: There does not appear to be further need to Mental Health services. Patients rapid speech is baseline additionally he is working with Roseanne from [facility] who can help schedule appointments " for patient following hospitalization.     Plan for Care reviewed with Assigned Medical Provider? Yes. Provider, Dr. Almeida, response: Dr. Yi again does not feel there is a need for patient to be seen by mental health services as she does not believe he is a risk of harm to himself or others.    Extended Care can follow in the future if needed.    Moises Ervin  [facility], Extended Care   [phone]

## 2023-09-19 NOTE — DISCHARGE INSTRUCTIONS
There is no stroke and there is no tumor seen on your brain.  This does fit with vertigo.  It does run in your family.  Talk with your primary care provider about where they would like you to follow-up for it.

## 2023-09-19 NOTE — ED NOTES
[facility] ED Mental Health Handoff Note:     Assuming care from: Dr Leeann Ruggiero    Brief HPI: 43 year old male signed out to me by the above provider. See initial ED Provider note for full details of the presentation.   In brief, patient  has autism disorder, but has been more manic.  He had an inhospital fall here while getting out of bed, c/o knee pain but has been ambulatory and xray is unremarkable.    Recommending placement by DEC who has seen him.  Is experiencing some delusions.    Home meds reviewed and ordered/administered: No  Medically stable for inpatient mental health admission: Yes.  Evaluated by mental health: Yes. The recommendation is for inpatient mental health treatment. Bed search in process  Safety concerns: At the time I received sign out,  patient fell last night getting out of bed .    Hold Status:  Active Orders   N/A           Labs/Imaging:   Mri results reviewed       ED Meds:  Medications   LORazepam (ATIVAN) tablet 1 mg (has no administration in time range)   melatonin tablet 10 mg (10 mg Oral $Given 9/19/23 0040)   carboxymethylcellulose PF (REFRESH PLUS) 0.5 % ophthalmic solution 1 drop (has no administration in time range)   acetaminophen (TYLENOL) tablet 975 mg (975 mg Oral $Given 9/19/23 0801)   LORazepam (ATIVAN) tablet 1 mg (1 mg Oral $Given 9/18/23 1803)   LORazepam (ATIVAN) tablet 1 mg (1 mg Oral $Given 9/18/23 1941)   hydrOXYzine (ATARAX) tablet 25 mg (25 mg Oral $Given 9/18/23 1941)   LORazepam (ATIVAN) tablet 1 mg (1 mg Oral $Given 9/18/23 1941)   OLANZapine (zyPREXA) tablet 10 mg (10 mg Oral Not Given 9/19/23 0004)   sodium chloride 0.9% BOLUS 1,000 mL (1,000 mLs Intravenous $New Bag 9/19/23 1140)   gadobutrol (GADAVIST) injection 10 mL (10 mLs Intravenous $Given 9/19/23 1442)     MR Brain w/o & w Contrast   Final Result   IMPRESSION:   HEAD MRI:    1.  Normal head MRI.      HEAD MRA:    1.  Normal MRA [place].      NECK MRA:   1.  Normal neck MRA.      MRA  Brain ([place]) wo Contrast   Final Result   IMPRESSION:   HEAD MRI:    1.  Normal head MRI.      HEAD MRA:    1.  Normal MRA [place].      NECK MRA:   1.  Normal neck MRA.      MRA Neck (Carot[place]) wo & w Contrast   Final Result   IMPRESSION:   HEAD MRI:    1.  Normal head MRI.      HEAD MRA:    1.  Normal MRA [place].      NECK MRA:   1.  Normal neck MRA.      XR Knee Bilateral 1/2 Views   Final Result   IMPRESSION:    RIGHT KNEE: No displaced fracture. No knee joint effusion. Tiny patellar enthesophyte at the distal quadriceps tendon insertion.      LEFT KNEE: No displaced fracture. No knee joint effusion.          ED Course:      There were significant events during my shift.    10:04 AM starting about an hour ago I was informed by nursing staff that his mother was quite concerned and wanted to have him discharged.  I was actually going to see him for the shift at the same time and so did see and speak with the patient and reviewed the notes from yesterday as well as lab results.  He does talk very quickly, but that is his baseline per both the patient and per his mother because of his autism especially when he is in a stressful situation.  There has been no concerns at home for delusions, suicidal ideations.  Additionally, when I speak with him his thought process is very logical, he tells me a very well laid out history of what he was experiencing prior to coming in, why he is here, and why in the past he has been diagnosed with mental health issues and what their current approaches to having taken him off of the medication and letting the medication get out of his system to try to help him.  So, although he speaks rapidly he does not demonstrate any evidence of disorganized thought process or behaviors.  I clarified with the patient that he does want me to share information with his mother who does help him by being his county representative but she is not his decision maker although  she is his legal power of  if needed.    So, I did then call his mother and she again emphasized her concerns that this has been a recurrent pattern where he will come in and be felt to be experiencing mental health disease like suzanne but then after the assessment is done they find that he is in fact not manic.  I discussed with her that my focus is going to be his dizziness.  He has an underlying essential tremor that does sometimes affect his walking and falls, but that he does not typically experience dizziness.  He was on some Dramamine early in the summer for some nausea as he was coming off the medications thinking that that may have contributed but has not been on anything since then outside of general supplements.  He went over his supplement list with me and I did not identify anything that could be causing his symptoms.  On examination his dizziness is fatigable both with sitting upright and with turning his head mostly towards the right.  He does have some slight nystagmus that is very faint towards the right-hand side.  His laboratory testing was reviewed and appears to suggest that he is dehydrated so it may be a combination of orthostatic dizziness and or benign positional vertigo/peripheral vertigo.  However, as its been persistent for the past couple of days he is 43 years old and overweight we did discuss doing an MRI to rule out a stroke which he would like to do.  This also will see if there is bilateral carotid stenosis that might be causing his positional dizziness.  Otherwise he has no chest pain, and again lab work has already been done for other causes of dizziness like anemia or electrolyte imbalance, hypothyroidism    Patient was signed out to the oncoming provider, Dr. Dionna Sharma at shift change.  Patient is being discharged.  His mother was udpated.  His [place] provider is going to assist him at home but though he lives on a split level, they all feel comfortable with him at home  as he has a railing to use up the stairs, he will leave his walker on the mainlevel with his kitchen and doesn't need to otherwise go downstairs.  Patient discharged to continue follow up with his pmd.  Will offer meclizine.    Impression:    ICD-10-CM    1. Anxiety  F41.9       2. Vertigo  R42           Plan:    Awaiting imaging and ambulation results    Trisha Almeida MD  [facility] EMERGENCY DEPARTMENT  [address] [place] [id number] [phone]                  Trisha Almeida MD  09/19/23 5943       Trisha Almeida MD  09/19/23 7040

## 2023-09-19 NOTE — ED NOTES
Spoke with MD Bradley about the 2 orders of ativan 1 mg, one at 1900 and one at 1930. Asked if she wants the pt to have the two doses equaling ativan 2 mg. MD also notified that pt received a dose of ativan at 1800.  Per MD pt can have the 2 doses now, ativan 2 mg now.   Medication witnessed with Gisele BABIN.

## 2023-09-19 NOTE — ED NOTES
Roseanne Montgomery Coordinator called wanting to give information regarding pt.  She does not feel pt is manic, but instead what presented yesterday was d/t his autism.  Pt has been calm and cooperative since writer met him at approximately 0715.

## 2023-09-19 NOTE — ED NOTES
Spoke with mom.  Mom reports many concerns regarding psych assessment and is verbalizing frustration with ED staff.  Reassurance attempted to be given that pt is in a safe and calm state at this time.  Writer told mom that she will inform provider of concerns.  Offered pt advocate provider number and mom declined as she has already spoken with them today.

## 2023-09-19 NOTE — PROGRESS NOTES
CM received VM from [facility] Care coordinator Roseanne ([phone]). CM returned call and left VM with DEC contact info.    MARIO MarcanoW

## 2023-09-19 NOTE — ED NOTES
IP MH Referral Acuity Rating Score (RARS)    LMHP complete at referral to IP MH, with DEC; and, daily while awaiting IP MH placement. Call score to PPS.  CRITERIA SCORING   New 72 HH and Involuntary for IP MH (not adolescent) 1/1   Boarding over 24 hours 0/1   Vulnerable adult at least 55+ with multiple co morbidities; or, Patient age 11 or under 0/1   Suicide ideation without relief of precipitating factors 0/1   Current plan for suicide 0/1   Current plan for homicide 0/1   Imminent risk or actual attempt to seriously harm another without relief of factors precipitating the attempt 0/1   Severe dysfunction in daily living (ex: complete neglect for self care, extreme disruption in vegetative function, extreme deterioration in social interactions) 1/1   Recent (last 2 weeks) or current physical aggression in the ED 0/1   Restraints or seclusion episode in ED 0/1   Verbal aggression, agitation, yelling, etc., while in the ED 0/1   Active psychosis with psychomotor agitation or catatonia 0/1   Need for constant or near constant redirection (from leaving, from others, etc).  0/1   Intrusive or disruptive behaviors 0/1   TOTAL Acuity Total Score: 2

## 2023-09-19 NOTE — TELEPHONE ENCOUNTER
12:54 PM       Per [place] ED nurse Jayda, pt has discharged from ED. Pt has been added to discharge list, removed from worklist and intake is no longer following this patient.
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
## 2023-09-19 NOTE — CONSULTS
"      Initial Psychiatric Consult   Consult date: September 19, 2023         Reason for Consult, requesting source:    Medication for possible untreated bipolar disorder, could change dispo if more managed    Requesting source: Trisha Almeida    Labs and imaging reviewed. Notes reviewed and provider consulted.     Total time spent in card review, patient interview and coordination of care: 60 minutes     Telemedicine Visit: The patient was seen for a visit utilizing the [employer] system. Permission from the patient to conduct the exam by telemedicine was obtained prior to proceeding.  Glenroy was also informed that insurance will be billed for this contact.   Start Time: 9:48 am  Stop Time: 10:15 am  Patient Location):  [facility]   Provider Location: [employer]/remote  As the provider I attest to compliance with applicable laws and regulations related to telemedicine          HPI:   Psychiatry seeing patient today regarding medications.    Per ED provider from 9/18: \"Glenroy Adler is a 43 year old male with PMHx of ASD, OCD, depressive disorder, and anxiety who presents to the ED today via ambulance with dizziness and anxiety.      Patient has been seeing hallucinations recently. He notes that yesterday night (around 7 pm), he went to sleep early, he was laying on his bed and started to see 'different' 3-dimension geometric shapes and designs on his ceiling that was different from the shapes he saw last year when he was hallucinating. Mentions he started to feel really dizzy, lightheaded, and was nauseous so he went to the bathroom to vomit, but he didn't vomit. He notes that the \"'icking' feeling on his eyes, he started to feel it on his body. Reports he was shaking and body felt disoriented.  Was half-awake and half-asleep and has trouble sleeping at times. He has been taking melatonin. He notes that he started to see hallucination in his stomach as well. He reports he has been " "having GI issues as well and doesn't know the reason. He doesn't think that he has psychological issues and that the mean issue is dizziness and sleep deprivation. Patient's mother is Cleo Adler, her number is [phone]. May contact her for additional information.\"    Today, when asked if he is having feelings of anxiety, patient responded, \"No. I was anxious when I came to the hospital from 'white coat' anxiety.\" Patient reports some continued poor sleep, but attributes this to withdrawing from clomipramine in 2023. Patient shares that he has significant trauma related to the treatment of mental health diagnoses he did not have, as his Autism Spectrum Disorder was misdiagnosed. Patient shares that his rapid speech has often been misdiagnosed as bipolar disorder. When asked about mental health diagnoses, such as depression, anxiety or OCD, patient responds, \"I don't have any disorders.\" Patient denies SI/SIB, but endorses feeling sad, as his guinea pig  12 years ago this week. Patient denies AH/VH, and reports that he only hallucinates when he has lack of sleep.           Past Psychiatric History:   Per chart review: \"[facility] - 2021 - 2021 for OCD; [facility] 10/17/2019-10/30/2019  Suicide Attempts: Patient reported that he 'faked a suicide attempt' several years ago to get psychiatric help via psychiatric admissions. He denies other suicide attempts or engaging self-harm except  for 'picking at some scabs sometimes, but not to hurt myself.'   Previous Medication Trials: citalopram, escitalopram, clomipramine, desvenlafaxine, guanfacine, diazepam, lorazepam, melatonin, prazosin, trazodone, depakote, aripiprazole, olanzapine.\"            Substance Use and History:     Tobacco Use    Smoking status: Never    Smokeless tobacco: Never   Substance Use Topics    Alcohol use: Never           Past Medical History:   PAST MEDICAL HISTORY:   Past Medical History: "   Diagnosis Date    Anxiety     Autism     Depressive disorder     Essential tremor        PAST SURGICAL HISTORY: No past surgical history on file.          Family History:   FAMILY HISTORY: No family history on file.        Social History:   [place]          Physical ROS:   The 10 point Review of Systems is negative other than noted in the HPI or here.           Medications:   Per chart review, patient has tried Prozac, Lexapro, Celexa, Remeron, trazodone, temazepam, clomipramine, Abilify, Seroquel, Zyprexa, Depakote, diazepam, lorazepam, hydroxyzine, clonidine, guanfacine, propanolol, propanolol LA, melatonin, Benadryl and l-methylfolate.     Additional chart review indicates that patient reports not responding well to SSRIs and feels that lorazepam is the most helpful.     Medications as of hospital visit 5/2023, which patient stopped one month prior:   Clomipramine (Anafranil) 25 mg bid  Diazepam (Valium) 10 mg tid  Diphenhydramine (Benadryl) 25 mg q6h PRN  Lorazepam (Ativan) 2 mg once daily PRN  Trazodone (Desyrel) 50 mg PRN at HS           Allergies:     Allergies   Allergen Reactions    Dust Mites     Metamucil [Fiber]     Smoke.           Labs:     Recent Results (from the past 48 hour(s))   Comprehensive metabolic panel    Collection Time: 09/18/23  4:30 PM   Result Value Ref Range    Sodium 137 136 - 145 mmol/L    Potassium 4.1 3.4 - 5.3 mmol/L    Chloride 100 98 - 107 mmol/L    Carbon Dioxide (CO2) 24 22 - 29 mmol/L    Anion Gap 13 7 - 15 mmol/L    Urea Nitrogen 11.5 6.0 - 20.0 mg/dL    Creatinine 0.77 0.67 - 1.17 mg/dL    Calcium 9.9 8.6 - 10.0 mg/dL    Glucose 108 (H) 70 - 99 mg/dL    Alkaline Phosphatase 75 40 - 129 U/L    AST 41 0 - 45 U/L    ALT 63 0 - 70 U/L    Protein Total 7.8 6.4 - 8.3 g/dL    Albumin 4.8 3.5 - 5.2 g/dL    Bilirubin Total 0.6 <=1.2 mg/dL    GFR Estimate >90 >60 mL/min/1.73m2   TSH with free T4 reflex    Collection Time: 09/18/23  4:30 PM   Result Value Ref  Range    TSH 2.41 0.30 - 4.20 uIU/mL   Magnesium    Collection Time: 09/18/23  4:30 PM   Result Value Ref Range    Magnesium 2.0 1.7 - 2.3 mg/dL   Ethyl Alcohol Level    Collection Time: 09/18/23  4:30 PM   Result Value Ref Range    Alcohol ethyl <0.01 <=0.01 g/dL   CBC with platelets and differential    Collection Time: 09/18/23  4:30 PM   Result Value Ref Range    WBC Count 11.7 (H) 4.0 - 11.0 10e3/uL    RBC Count 5.99 (H) 4.40 - 5.90 10e6/uL    Hemoglobin 17.4 13.3 - 17.7 g/dL    Hematocrit 47.9 40.0 - 53.0 %    MCV 80 78 - 100 fL    MCH 29.0 26.5 - 33.0 pg    MCHC 36.3 31.5 - 36.5 g/dL    RDW 12.5 10.0 - 15.0 %    Platelet Count 258 150 - 450 10e3/uL    % Neutrophils 75 %    % Lymphocytes 18 %    % Monocytes 7 %    % Eosinophils 0 %    % Basophils 0 %    % Immature Granulocytes 0 %    NRBCs per 100 WBC 0 <1 /100    Absolute Neutrophils 8.7 (H) 1.6 - 8.3 10e3/uL    Absolute Lymphocytes 2.1 0.8 - 5.3 10e3/uL    Absolute Monocytes 0.8 0.0 - 1.3 10e3/uL    Absolute Eosinophils 0.0 0.0 - 0.7 10e3/uL    Absolute Basophils 0.1 0.0 - 0.2 10e3/uL    Absolute Immature Granulocytes 0.0 <=0.4 10e3/uL    Absolute NRBCs 0.0 10e3/uL   Drug Abuse Screen Qual Urine    Collection Time: 09/18/23 10:02 PM   Result Value Ref Range    Amphetamines Urine Screen Negative Screen Negative    Barbituates Urine Screen Negative Screen Negative    Benzodiazepine Urine Screen Negative Screen Negative    Cannabinoids Urine Screen Negative Screen Negative    Cocaine Urine Screen Negative Screen Negative    Fentanyl Qual Urine Screen Negative Screen Negative    Opiates Urine Screen Negative Screen Negative    PCP Urine Screen Negative Screen Negative   Asymptomatic COVID-19 Virus (Coronavirus) by PCR Nasopharyngeal    Collection Time: 09/19/23  5:41 AM    Specimen: Nasopharyngeal; Swab   Result Value Ref Range    SARS CoV2 PCR Negative Negative          Physical and Psychiatric Examination:     /78 (BP Location: Left arm)   Pulse 85    "Temp 98.1  F (36.7  C) (Oral)   Resp 18   Ht 1.778 m (5' 10\")   Wt 113.9 kg (251 lb)   SpO2 95%   BMI 36.01 kg/m    Weight is 251 lbs 0 oz  Body mass index is 36.01 kg/m .    Physical Exam:  I have reviewed the physical exam as documented by by the medical team and agree with findings and assessment and have no additional findings to add at this time.         MSE:   Appearance: awake, alert  Attitude:  cooperative and guarded  Eye Contact:  fair  Mood:  \"fair\"  Affect:  intensity is flat and slightly restricted  Speech:  pressured speech  Psychomotor Behavior:  no evidence of tardive dyskinesia, dystonia, or tics  Muscle strength and tone: Appears slightly over average  Thought Process:  linear and tangental  Associations:  no loose associations  Thought Content:  no evidence of suicidal ideation or homicidal ideation, no evidence of psychotic thought, no auditory hallucinations present, and no visual hallucinations present  Insight:  partial  Judgement:  intact  Oriented to:  time, person, and place  Attention Span and Concentration:  fair  Recent and Remote Memory:  intact             DSM-5 Diagnosis:   296.36 (F33.42) Major Depressive Disorder, Recurrent Episode, In full remission _  300.01 (F41.0) Panic Disorder  300.22 (F40.00) Agoraphobia  300.3 (F42) Obsessive Compulsive Disorder  309.81 (F43.10) Posttraumatic Stress Disorder (includes Posttraumatic Stress Disorder for Children 6 Years and Younger)  Without dissociative symptoms          Assessment:   Patient was somewhat guarded with me when we met. However, he was pleasant and thoughtful in his discussion with me. Although patient's speech is rapid, he was insightful and able to explain his rationale for avoiding medications. Specifically, there appears to be a significant amount of trauma related to past diagnoses and the subsequent pharmacological treatment of these. Patient does report difficulty sleeping, but sounds like it is the result of " ruminating thoughts and not due to bipolar disorder.               Summary of Recommendations:   Might consider scheduled Melatonin 10 mg at dinnertime to assist with sleep - this is patient's current dose    Patient denies suicidality and is not a safety concern.  From a psychiatric standpoint, patient would be safe to discharge home      Page me or re-consult psychiatry as needed.       ROSA M Olivo, UNIQUE  Consult/Liaison Psychiatry  [facility]   Contact information available via [facility] Paging/Directory.  If I am not available, please call [facility] intake ([phone])

## 2023-09-19 NOTE — TELEPHONE ENCOUNTER
R:    12:25p Received call from [facility] informing that ED MD plans to keep pt in the ED d/t medical concerns and requesting pt be removed from the IP MH [facility]. Intake notified pt's PPS.

## 2023-09-19 NOTE — TELEPHONE ENCOUNTER
[place] ONLY:   [place]: [place]  Access Inpatient Bed Call Log 9/18/23 @3:32 PM    Intake has called facilities that have not updated the bed status within the last 12 hours.                              [facility] is at capacity.                          [facility] is posting 0 beds. [phone].                 [facility] is posting 0 beds. Neg covid required.  72HH preferred. [phone]                          [facility] is posting 0 bed. Negative covid required. [phone].               [facility] is posting 0 beds.                    [facility] is posting 0 beds. [phone].                 [facility] is posting 0 beds                       [facility] is posting 0 beds.  [phone]               [facility] is posting 0 beds. Low acuity only.                       Pt remains on the work list pending appropriate bed availability.

## 2023-09-19 NOTE — CONSULTS
Pt is currently voluntary for mental health recommendations. [facility] will not be filing commitment at this time.     No further action necessary at this time.     FEDE VILLAR

## 2023-09-19 NOTE — ED PROVIDER NOTES
1:02 AM.  Mental health patient previously seen by Dr. Calix.  Patient with history of autism and present thing with increasing anxiety and pressured speech and reportedly hallucinations.  Previous provider concerned about possible suzanne on top of his anxiety and autism.  Patient previously on a 72-hour hold.  Psychiatric evaluation and DEC assessment still pending.  Patient be signed out to the morning physician at 5 AM.     Adam Saenz MD  09/19/23 0103

## 2023-09-19 NOTE — ED NOTES
Pt given fluids and snack by Dr. Almeida.  Ax1 and walker to bathroom d/t dizziness.  Writer offered to call and update mother approximately 30 min ago and pt declined.  Mom now calling and pt is not wanting writer to speak to her.  Pt requesting call be transferred in to pt room.  Pt frequently up without assistance despite many reminders on fall precautions.

## 2023-09-19 NOTE — MEDICATION SCRIBE - ADMISSION MEDICATION HISTORY
Medication Scribe Admission Medication History    Admission medication history is complete. The information provided in this note is only as accurate as the sources available at the time of the update.    Medication reconciliation/reorder completed by provider prior to medication history? No    Information Source(s): Patient via in-person    Pertinent Information: Patient reported medication rec., he is a man of many words, goes on tangents (not concise.)  Pt wasn't aware of medication names and doses with all meds.  He suggested if I didn't believe his report that I could ask his parents, even though he takes his own meds.    Changes made to PTA medication list:  Added: Magnesium 400, Tylenol  Deleted: Celexa, Anafranil (therapy completed in Mar.), Pristiq, Diazepam, Tenex, Ativan, Minipress.  Pt. Says that the listed meds are all outdated, and asked to take them off his list.   Changed: Melatonin to 5 mg    Medication Affordability:  Not including over the counter (OTC) medications, was there a time in the past 3 months when you did not take your medications as prescribed because of cost?: No    Allergies reviewed with patient and updates made in EHR: yes    Medication History Completed By: Don Villanueva 9/18/2023 9:36 PM  PTA Med List   Medication Sig Last Dose    acetaminophen (TYLENOL) 500 MG tablet Take 500-1,000 mg by mouth every 6 hours as needed for mild pain 9/18/2023 at prn    Baclofen (LIORESAL) 5 MG tablet Take 0.5 tablets (2.5 mg) by mouth 3 times daily as needed for other (anxiety) Past Week at prn    magnesium oxide (MAG-OX) 400 MG tablet Take 400 mg by mouth daily 9/18/2023 at am    melatonin 5 MG tablet Take 3 tablets (15 mg) by mouth nightly as needed for sleep (Patient taking differently: Take 10 mg by mouth nightly as needed for sleep) 9/17/2023 at pm    MELATONIN PO Take 3 mg by mouth 9/17/2023 at prn    Vitamin D3 (CHOLECALCIFEROL) 25 mcg (1000 units) tablet Take 2 tablets (50 mcg) by mouth  daily 9/18/2023 at am

## 2023-09-19 NOTE — ED NOTES
Tech set up and instructed Pt on use of the walker. Pt demonstrated proper use and understanding of walker.

## 2023-09-19 NOTE — ED NOTES
"Pt states \"I have had bad reactions to SSRIs, antipsychotics, and antianxiety medications. I refuse to take anything other than tylenol and melatonin. I do not want to be placed on any new medication regimens.\"  "

## 2023-09-19 NOTE — TELEPHONE ENCOUNTER
No appropriate beds are currently available within the  system. Bed search update (Statewide - [place] is on [place] [facility] HH) @ 12:40AM:       [facility]: @ cap per website  [facility]: @ cap per website  [facility]: @ cap per website. Per Randi @ 00:41, they are closed until Friday  [facility]: @ cap per website  [facility]: @ cap per website  [facility]: @ cap per website  [facility]: @ cap per website  [facility]: @ cap per website  [facility]: @ cap per website  [facility]: @ cap per website  [facility]: @ cap per website  [facility]: @ cap per website  [facility]: @ cap per website  [facility]: Does not review after 10PM.    [facility]: Posting beds in [place] and [place]. Per Vivi @ 00:42, only low acuity beds in [facility] and [place] are on divert.   [facility]: Posting 6 beds (No hx of aggression. No sexual offenders. Voluntary patients only). Meets exclusionary d/t 72 HH  [facility]: Posting 8 beds (Low acuity only. Must have the cognitive ability to do programming. No aggressive or violent behavior or recent HX in the last 2 yrs. MH must be primary).  [facility]: @ cap per website  [facility]: Posting 3 beds; Low acuity, Neg Covid. Per Sandra @ 00:43 they are full tonight but suggests calling back later this morning  [facility]: Posting 3 beds (Covid neg. Voluntary only. Mixed unit. No aggressive or violent behavior. No registered sex offenders). Meets exclusionary d/t 72 HH  [facility]: @ cap per website  [facility]: Posting 5 beds. Facility is in [place]. Pt is on a [place] [facility] HH; cannot be placed in [place]  [facility]: Posting 2 beds (Mixed unit/Low acuity). Per Abigail @ 03:18 based screening questions pt would need high acuity bed which is not available       Pt remains on work list until appropriate placement is available
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
## 2023-09-22 LAB
ATRIAL RATE - MUSE: 105 BPM
DIASTOLIC BLOOD PRESSURE - MUSE: NORMAL MMHG
INTERPRETATION ECG - MUSE: NORMAL
P AXIS - MUSE: 31 DEGREES
PR INTERVAL - MUSE: 134 MS
QRS DURATION - MUSE: 80 MS
QT - MUSE: 348 MS
QTC - MUSE: 459 MS
R AXIS - MUSE: 4 DEGREES
SYSTOLIC BLOOD PRESSURE - MUSE: NORMAL MMHG
T AXIS - MUSE: 43 DEGREES
VENTRICULAR RATE- MUSE: 105 BPM

## 2024-06-09 ENCOUNTER — HOSPITAL ENCOUNTER (EMERGENCY)
Facility: HOSPITAL | Age: 44
Discharge: HOME | End: 2024-06-09
Attending: EMERGENCY MEDICINE | Admitting: EMERGENCY MEDICINE
Payer: MEDICARE

## 2024-06-09 ENCOUNTER — APPOINTMENT (OUTPATIENT)
Dept: RADIOLOGY | Facility: HOSPITAL | Age: 44
End: 2024-06-09
Attending: EMERGENCY MEDICINE
Payer: MEDICARE

## 2024-06-09 VITALS
HEART RATE: 110 BPM | RESPIRATION RATE: 20 BRPM | SYSTOLIC BLOOD PRESSURE: 172 MMHG | WEIGHT: 284.1 LBS | OXYGEN SATURATION: 97 % | HEIGHT: 70 IN | BODY MASS INDEX: 40.67 KG/M2 | DIASTOLIC BLOOD PRESSURE: 101 MMHG

## 2024-06-09 DIAGNOSIS — R06.89 DIFFICULTY BREATHING: ICD-10-CM

## 2024-06-09 LAB
ANION GAP SERPL CALCULATED.3IONS-SCNC: 12 MMOL/L (ref 7–15)
BUN SERPL-MCNC: 17.2 MG/DL (ref 6–20)
CALCIUM SERPL-MCNC: 9.5 MG/DL (ref 8.6–10)
CHLORIDE SERPL-SCNC: 102 MMOL/L (ref 98–107)
CREAT SERPL-MCNC: 0.88 MG/DL (ref 0.67–1.17)
DEPRECATED HCO3 PLAS-SCNC: 25 MMOL/L (ref 22–29)
EGFRCR SERPLBLD CKD-EPI 2021: >90 ML/MIN/1.73M2
ERYTHROCYTE [DISTWIDTH] IN BLOOD BY AUTOMATED COUNT: 12.9 % (ref 10–15)
GLUCOSE SERPL-MCNC: 131 MG/DL (ref 70–99)
HCT VFR BLD AUTO: 46 % (ref 40–53)
HGB BLD-MCNC: 16.2 G/DL (ref 13.3–17.7)
MCH RBC QN AUTO: 29 PG (ref 26.5–33)
MCHC RBC AUTO-ENTMCNC: 35.2 G/DL (ref 31.5–36.5)
MCV RBC AUTO: 82 FL (ref 78–100)
NT-PROBNP SERPL-MCNC: <36 PG/ML (ref 0–450)
PLATELET # BLD AUTO: 256 10E3/UL (ref 150–450)
POTASSIUM SERPL-SCNC: 4.4 MMOL/L (ref 3.4–5.3)
RBC # BLD AUTO: 5.59 10E6/UL (ref 4.4–5.9)
SODIUM SERPL-SCNC: 139 MMOL/L (ref 135–145)
WBC # BLD AUTO: 10.1 10E3/UL (ref 4–11)

## 2024-06-09 PROCEDURE — 71046 X-RAY EXAM CHEST 2 VIEWS: CPT

## 2024-06-09 PROCEDURE — 99284 EMERGENCY DEPT VISIT MOD MDM: CPT | Mod: 25

## 2024-06-09 PROCEDURE — 36415 COLL VENOUS BLD VENIPUNCTURE: CPT | Performed by: EMERGENCY MEDICINE

## 2024-06-09 PROCEDURE — 83880 ASSAY OF NATRIURETIC PEPTIDE: CPT | Performed by: EMERGENCY MEDICINE

## 2024-06-09 PROCEDURE — 80048 BASIC METABOLIC PNL TOTAL CA: CPT | Performed by: EMERGENCY MEDICINE

## 2024-06-09 PROCEDURE — 85027 COMPLETE CBC AUTOMATED: CPT | Performed by: EMERGENCY MEDICINE

## 2024-06-09 ASSESSMENT — COLUMBIA-SUICIDE SEVERITY RATING SCALE - C-SSRS
2. HAVE YOU ACTUALLY HAD ANY THOUGHTS OF KILLING YOURSELF IN THE PAST MONTH?: NO
1. IN THE PAST MONTH, HAVE YOU WISHED YOU WERE DEAD OR WISHED YOU COULD GO TO SLEEP AND NOT WAKE UP?: NO
6. HAVE YOU EVER DONE ANYTHING, STARTED TO DO ANYTHING, OR PREPARED TO DO ANYTHING TO END YOUR LIFE?: NO

## 2024-06-09 ASSESSMENT — ACTIVITIES OF DAILY LIVING (ADL)
ADLS_ACUITY_SCORE: 38
ADLS_ACUITY_SCORE: 36

## 2024-06-09 NOTE — ED PROVIDER NOTES
"EMERGENCY DEPARTMENT ENCOUNTER      NAME: Glenroy Adler  YOB: 1980  MRN: [id number]    FINAL IMPRESSION  1. Difficulty breathing        MEDICAL DECISION MAKING   Pertinent Labs & Imaging studies reviewed. (See chart for details)    Glenroy Adler is a 44 year old male who presents via EMS for evaluation of a myriad of complaints.  Patient is a very difficult and relatively poor historian with very pressured and tangential speech.  He had some difficulty in articulating his symptoms and during my initial encounter, frequently became frustrated and repeatedly interrupted to the point that it was difficult to ascertain exactly what his help was for this visit.  On initial arrival, patient complained of difficulty breathing and concern about swelling around his neck/throat.  He also reported other general complaints including \"whitecoat syndrome\" and mistrust of the medical system.  He was adamant that he does not have a mental health problem and notes that he stopped taking all of his psychiatric medications in May 2023 but is concerned t    At time of my initial evaluation, patient was fixated on some statement made by the paramedic who he states told him that he was experiencing \"fluid retention\" localized to his anterior neck and that this was likely what was causing his sensation of difficulty breathing and swallowing.  He denied associated lip or tongue swelling and was not experiencing much \"fluid retention\" in other parts of his body, although he reports he has in the past.  He states that he was told by previous providers that he is \"not a candidate\" for a diuretic but instead, should take magnesium supplements for his fluid retention.  He has been doing so but wonders why they are not helping.  In addition, patient described multiple other symptoms including but not limited to a headache, mild cough, difficulty sleeping, abdominal pain and hernia, hemorrhoids.  He stated more than " once that his symptoms are definitely not psychiatric in nature and he was not at all interested in speaking with anyone about the possibility that anxiety may be contributing.    Records reviewed patient has a history of anxiety, hypertension, generalized abdominal pain, autism spectrum disorder, bipolar disorder, PTSD, anxiety, OCD, depression, panic disorder.  On review of EMR, it appears that patient makes very frequent phone calls to his primary clinic regarding a myriad of different symptoms and concerns about medications.  Since 5/3/2024 he has made 9 separate phone calls, most recently on 6/8/2024.  I do not see any recent in person visits.  He was seen here in the ED on 9/18/2023 at which time he appeared to have increasing anxiety with pressured speech and hallucinations.  There was some concern about possible suzanne and he was on a 72-hour hold.  When he met with DEC at that time, he was noted to have pressured, rapid speech with tangential content and reported visual hallucinations.  He was very fixated on medical topics and had been noncompliant with his medications.  DEC recommended admission and 72-hour hold.  After period of observation and with collateral, it was determined that patient would likely not benefit from inpatient admission.  His rapid speech is apparently baseline and he was working with outpatient services to set up support system.    I considered a very broad differential including but not limited to decompensation of previously diagnosed psychiatric disorder, viral illness, medication noncompliance/tolerance, dietary indiscretion, anxiety, panic attack.  Although patient was concerned about difficulty breathing and swallowing, he had no compromise of the airway, stridor, lip or tongue swelling, wheezing, or other signs/symptoms to suggest anaphylactic reaction, epiglottitis, PTA, or deep space infection of the neck/throat.  Lungs are clear to auscultation bilaterally so I also low  suspicion for pneumonia, pulmonary edema, pneumothorax, asthma/COPD exacerbation or any other pulmonary process.  Patient is not currently complaining of any chest pain so I also low suspicion for ACS/ischemia, pericarditis, myocarditis, or other cardiac etiology.  Patient is nontoxic-appearing and appears well-nourished and well-hydrated so I have low suspicion for metabolic derangements, hypercapnia, hypoxia, anemia, acute kidney injury, or any other abnormality requiring laboratory workup.  Patient is PERC negative so I also have low suspicion for PE and do not feel that further evaluation of this would be necessary.  Overall, I do have strong suspicion that his symptoms are at least in part related to his underlying anxiety, even if he does not agree.  After reviewing outside records, it does appear that his pressured speech is a chronic issue.  I did offer DEC assessment but patient vehemently declined.  After some discussion, we agreed on plan to check basic labs    ED Course as of 06/10/24 2130   Sun Jun 09, 2024   0318 N-Terminal Pro BNP Inpatient: <36  BNP within normal limits. No clinical concern for CHF exacerbation and CXR without pulmonary edema. Unlikely CHF as etiology of symptoms.     0319 CBC (+ platelets, no diff)  CBC reassuring. No evidence of leukocytosis to suggest systemic infectious/inflammatory process. No acute anemia. PLTs wnl.    0319 Basic metabolic panel(!)  BMP reassuring. No evidence of MALKA, acidosis, or significant electrolyte derangement.      I independently reviewed chest x-ray which showed no obvious pneumothorax, infiltrate, pulmonary edema, or displaced rib fractures.      I rechecked the patient and reviewed these results.  Patient felt reassured, although did continue to have very pressured speech with tangential statements.  Again, I do believe this is his baseline.  He declined again to meet with mental health providers today and states that he does not believe that his  symptoms are related to this.  At this point, I do not believe patient meets criteria for an emergent hold, as he does not seem to be at risk to himself, is not expressing any suicidal ideation, homicidal ideation, or hallucinations.  I encouraged him to follow-up with his primary care provider to discuss his symptoms and medications further if he does not have improvement.    Strict return precautions and follow up recommendations were discussed and all questions were answered. Patient endorsed understanding and was in agreement with plan.    I independently reviewed CXR (as noted above), formal radiologist read pending.      Medical Decision Making  Obtained supplemental history:Supplemental history obtained?: EMS  Reviewed external records: External records reviewed?: Documented in chart  Care impacted by chronic illness:Mental Health  Care significantly affected by social determinants of health:Access to Medical Care, Literacy, No Support for Care at Home, and No Transportation for Health Care  Did you consider but not order tests?: Work up considered but not performed and documented in chart, if applicable  Did you interpret images independently?: Independent interpretation of ECG and images noted in documentation, when applicable.  Consultation discussion with other provider:Did you involve another provider (consultant, , pharmacy, etc.)?: No  Discharge. I discussed a prescription for anxiolytic, but deferred after shared decision making discussion.. I considered admission, but ultimately discharged patient after he was not interested in further workup or DEC assessment.      ED COURSE  1:54 AM I met with the patient, obtained history, performed an initial exam, and discussed options and plan for diagnostics and treatment here in the ED.   3:15 AM I rechecked the patient.         MEDICATIONS GIVEN IN THE ED  Medications - No data to display    NEW PRESCRIPTIONS STARTED AT TODAY'S VISIT  Discharge Medication  "List as of 6/9/2024  3:33 AM             =================================================================    Chief Complaint   Patient presents with    Anxiety         HPI:    Patient information was obtained from: The patient    Use of : N/A     Glenroy Adler is a 44 year old male who presents with anxiety.    The patient reports he had difficulty sleeping tonight because of \"water retention to his throat\". He called the ambulance and reports they noticed significant \"water retention\" to his throat. He is not on any medications other than taking magnesium supplements. He did take a dose of Tylenol earlier in the day for a headache. He reports his \"water retention\" moves around. He endorses a new onset of cough. He gets nauseous when he is sleep deprived. He has a history of Parkinson's, abdominal hernia, and hemorrhoids.     Otherwise, the patient denied having fevers, chills, chest pain, and any other medical complaints at this time.        RELEVANT HISTORY, MEDICATIONS, & ALLERGIES   Past medical history, surgical history, family history, medications, and allergies reviewed and pertinent noted in HPI.    REVIEW OF SYSTEMS:  A complete review of systems was performed with pertinent positives and negatives noted in the HPI. All other systems negative.     PHYSICAL EXAM:    Vitals: BP (!) 172/101   Pulse 110   Resp 20   Ht 1.778 m (5' 10\")   Wt 128.9 kg (284 lb 1.6 oz)   SpO2 97%   BMI 40.76 kg/m     General: Alert and interactive, anxious and disheveled appearing.  HENT: Atraumatic. Full AROM of neck. Conjunctiva clear. No oropharyngeal edema, erythema, exudates, masses or asymmetry. No stridor. Managing secretions. Floor of mouth soft. No tenderness or erythema to palpation of submandibular tissues.   Cardiovascular: Tachycardic, regular.   Chest/Pulmonary: Normal work of breathing. Speaking in complete sentences. Lungs CTAB. No chest wall tenderness or deformities.  Abdomen: Soft, " nondistended. Nontender without guarding or rebound.  Extremities: Normal AROM of all major joints.  Skin: Warm and dry. Normal skin color.   Neuro: Speech rapid but clear. CNs grossly intact. Moves all extremities spontaneously.   Mental Status: Mood euthymic per patient. Very anxious affect, incongruent with reported mood. Eye contact intact. Does not appear to be responding to internal stimuli. Thought process and content very disorganized, without clear delusions. Speech very tangential, rapid, and pressured. Denies suicidal ideation, homicidal ideation, visual hallucinations, or auditory hallucinations.     LAB  Labs Ordered and Resulted from Time of ED Arrival to Time of ED Departure   BASIC METABOLIC PANEL - Abnormal       Result Value    Sodium 139      Potassium 4.4      Chloride 102      Carbon Dioxide (CO2) 25      Anion Gap 12      Urea Nitrogen 17.2      Creatinine 0.88      GFR Estimate >90      Calcium 9.5      Glucose 131 (*)    NT PROBNP INPATIENT - Normal    N terminal Pro BNP Inpatient <36     CBC WITH PLATELETS - Normal    WBC Count 10.1      RBC Count 5.59      Hemoglobin 16.2      Hematocrit 46.0      MCV 82      MCH 29.0      MCHC 35.2      RDW 12.9      Platelet Count 256         RADIOLOGY  XR Chest 2 Views   Final Result   IMPRESSION: Linear atelectasis or scar in the right middle lobe. Otherwise negative chest x-ray.                I, Fredis Brewster, am serving as a scribe to document services personally performed by Dr. Roseanne Landeros based on my observation and the provider's statements to me. I, Roseanne Landeros MD attest that Fredis Brewster is acting in a scribe capacity, has observed my performance of the services and has documented them in accordance with my direction.    Roseanne Landeros M.D.  Emergency Medicine  [facility] EMERGENCY DEPARTMENT  [facility] [facility] [id number] [phone]  Dept: [phone]     Roseanne Landeros  MD  06/10/24 7562

## 2024-06-09 NOTE — DISCHARGE INSTRUCTIONS
You were seen in the Emergency Department today for concern about breathing.    Your labs and x-ray today looked very good.  I am not sure exactly what is causing all of your symptoms.  It could be related to your diet but I do also think you need to have your hormones, cortisol, and possibly some other test done to further evaluate your symptoms if you do not think there related to anxiety or a side effect of the medications that you were on.  In the meantime, I would recommend that you continue to take all of your medications as prescribed.      Please return to the ER if you experience difficulty swallowing, trouble breathing, and/or for any other new or concerning symptoms, otherwise please follow up with your primary doctor for recheck.  I did put a referral in for you to be seen by an internal medicine doctor in our system.  You can also call the [facility] like they recommended in the past.    Below is some information you might find useful.     Thank you for choosing [facility]. It was a pleasure taking care of you today!  - Dr. Roseanne Landeros

## 2024-06-09 NOTE — ED NOTES
"Pt in room, breathing fast repeating \"I have water retention, I have a neck I have no neck\". Pt states \"my water retention moves around my body, and is obstructing my breathing\".     Author notes throat is not swollen, oral mucus membrane pink intact and moist. Author did not feel any swelling upon assessment. Lungs are clear equal bilateral, O2 sats remain above 95% on RA.    MD at bedside, pt continuously interrupted MD to tell her problems with internal medicine and family doctor.   "

## 2024-06-09 NOTE — ED TRIAGE NOTES
"Pt. Comes in via [facility] with complaints of shortness of breath and possible swelling around his neck and throat.  Stated that he feels a \"tight ball\" in his throat.  Pt. Is very anxious in triage.  Denied swelling to tongue, is able to swallow secretions. Also has multiple other generalized complaints.   Pt. Stated that he has \"white coat syndrome\" so he does not take medications for the anxiety or BP. Stated that he thinks he need a water pill but no one will give him one. He stated that he stopped taking his psychiatric medications as of May 2023 and feels like he is still having some sort of withdrawal from them.       Triage Assessment (Adult)       Row Name 06/09/24 0017          Triage Assessment    Airway WDL WDL        Respiratory WDL    Respiratory WDL WDL        Skin Circulation/Temperature WDL    Skin Circulation/Temperature WDL WDL        Cardiac WDL    Cardiac WDL WDL        Peripheral/Neurovascular WDL    Peripheral Neurovascular WDL WDL        Cognitive/Neuro/Behavioral WDL    Cognitive/Neuro/Behavioral WDL WDL                     "

## 2024-06-27 ENCOUNTER — HOSPITAL ENCOUNTER (EMERGENCY)
Facility: HOSPITAL | Age: 44
Discharge: HOME | End: 2024-06-27
Attending: EMERGENCY MEDICINE | Admitting: EMERGENCY MEDICINE
Payer: MEDICARE

## 2024-06-27 VITALS
SYSTOLIC BLOOD PRESSURE: 173 MMHG | OXYGEN SATURATION: 96 % | HEIGHT: 70 IN | TEMPERATURE: 98.6 F | DIASTOLIC BLOOD PRESSURE: 95 MMHG | BODY MASS INDEX: 40.8 KG/M2 | WEIGHT: 285 LBS | HEART RATE: 106 BPM | RESPIRATION RATE: 22 BRPM

## 2024-06-27 DIAGNOSIS — R30.0 DYSURIA: ICD-10-CM

## 2024-06-27 DIAGNOSIS — R33.9 URINARY RETENTION WITH INCOMPLETE BLADDER EMPTYING: ICD-10-CM

## 2024-06-27 PROBLEM — F41.9 ANXIETY: Status: ACTIVE | Noted: 2019-10-17

## 2024-06-27 PROBLEM — I10 ESSENTIAL HYPERTENSION: Status: ACTIVE | Noted: 2024-01-10

## 2024-06-27 LAB
ALBUMIN UR-MCNC: 30 MG/DL
APPEARANCE UR: CLEAR
BILIRUB UR QL STRIP: NEGATIVE
COLOR UR AUTO: ABNORMAL
GLUCOSE UR STRIP-MCNC: NEGATIVE MG/DL
HGB UR QL STRIP: NEGATIVE
HYALINE CASTS: 1 /LPF
KETONES UR STRIP-MCNC: NEGATIVE MG/DL
LEUKOCYTE ESTERASE UR QL STRIP: NEGATIVE
MUCOUS THREADS #/AREA URNS LPF: PRESENT /LPF
NITRATE UR QL: NEGATIVE
PH UR STRIP: 6 [PH] (ref 5–7)
PSA SERPL DL<=0.01 NG/ML-MCNC: 0.27 NG/ML (ref 0–2.5)
RBC URINE: <1 /HPF
SP GR UR STRIP: 1.03 (ref 1–1.03)
UROBILINOGEN UR STRIP-MCNC: <2 MG/DL
WBC URINE: 1 /HPF

## 2024-06-27 PROCEDURE — 81001 URINALYSIS AUTO W/SCOPE: CPT | Performed by: EMERGENCY MEDICINE

## 2024-06-27 PROCEDURE — 36415 COLL VENOUS BLD VENIPUNCTURE: CPT | Performed by: EMERGENCY MEDICINE

## 2024-06-27 PROCEDURE — 99284 EMERGENCY DEPT VISIT MOD MDM: CPT | Mod: 25

## 2024-06-27 PROCEDURE — 250N000013 HC RX MED GY IP 250 OP 250 PS 637: Performed by: EMERGENCY MEDICINE

## 2024-06-27 PROCEDURE — 51798 US URINE CAPACITY MEASURE: CPT

## 2024-06-27 PROCEDURE — 84153 ASSAY OF PSA TOTAL: CPT | Performed by: EMERGENCY MEDICINE

## 2024-06-27 RX ORDER — TAMSULOSIN HYDROCHLORIDE 0.4 MG/1
0.4 CAPSULE ORAL DAILY
Qty: 30 CAPSULE | Refills: 0 | Status: SHIPPED | OUTPATIENT
Start: 2024-06-27

## 2024-06-27 RX ORDER — TAMSULOSIN HYDROCHLORIDE 0.4 MG/1
0.4 CAPSULE ORAL ONCE
Status: COMPLETED | OUTPATIENT
Start: 2024-06-27 | End: 2024-06-27

## 2024-06-27 RX ADMIN — TAMSULOSIN HYDROCHLORIDE 0.4 MG: 0.4 CAPSULE ORAL at 02:22

## 2024-06-27 ASSESSMENT — ACTIVITIES OF DAILY LIVING (ADL)
ADLS_ACUITY_SCORE: 38
ADLS_ACUITY_SCORE: 38

## 2024-06-27 NOTE — ED PROVIDER NOTES
EMERGENCY DEPARTMENT ENCOUNTER      NAME: Glenroy Adler  AGE: 44 year old male  YOB: 1980  MRN: [id number]  EVALUATION DATE & TIME: 6/27/2024 12:45 AM    PCP: Arabella Rondon    ED PROVIDER: Dionna Sharma MD    Chief Complaint   Patient presents with    Urinary Retention         FINAL IMPRESSION:  1. Dysuria    2. Urinary retention with incomplete bladder emptying          ED COURSE & MEDICAL DECISION MAKING:    Pertinent Labs & Imaging studies reviewed. (See chart for details)  44 year old male with history of autism, OCD, and significant anxiety, HTN who presents to the Emergency Department for evaluation of urinary retention sensation that started this morning as well as dysuria since yesterday.  Strong suspicion for UTI.  Urinalysis however today does not show any signs of infection.  Prebladder scan prior to urination was 417 and after urination was 296.  He is not able to completely empty.  Does not have any known history of prostatic hypertrophy, will send PSA today and trial Flomax.  He does have a inguinal hernia into the left side of the scrotum, but I do not think that this is likely to be causing any urinary retention.  Considered placing Thomas catheter, but frankly with patient's significant anxiety and perseverating over his symptoms honestly I think this might actually heart cause more harm than good.  Will trial the Flomax, but if he were to bounce back with retention think would be reasonable for placement of catheter at that time.  Seems that this is only 1 day of symptoms will referral go for catheter for trial of Flomax.  Findings were discussed with patient.  Given referral information for urology.  States he would like to be seen by an internist as opposed to his family practice physician and so was given a referral information for the PCP clinic care across the street.      ED Course as of 06/27/24 0303   Thu Jun 27, 2024   0142 Bladder scanned for 417.  Able to  urinate approximately 100 mL.  Repeat bladder scan thereafter 296.       Medical Decision Making    History:  Supplemental history from: EMS  External Record(s) reviewed: Outpatient Record: Nurse triage line yesterday    Work Up:  Chart documentation includes differential considered and any EKGs or imaging independently interpreted by provider, see MDM  In additional to work up documented, I considered the following work up: see MDM    External consultation:  Discussion of management with another provider: N/A    Complicating factors:  Care impacted by chronic illness: Mental Health  Care affected by social determinants of health: Access to Medical Care night shift no access to PCP    Disposition considerations: Discharge. I prescribed additional prescription strength medication(s) as charted. See documentation for any additional details.        At the conclusion of the encounter I discussed the results of all of the tests and the disposition. The questions were answered. The patient or family acknowledged understanding and was agreeable with the care plan.      MEDICATIONS GIVEN IN THE EMERGENCY:  Medications   tamsulosin (FLOMAX) capsule 0.4 mg (0.4 mg Oral $Given 6/27/24 0222)       NEW PRESCRIPTIONS STARTED AT TODAY'S ER VISIT  Discharge Medication List as of 6/27/2024  2:23 AM        START taking these medications    Details   tamsulosin (FLOMAX) 0.4 MG capsule Take 1 capsule (0.4 mg) by mouth daily, Disp-30 capsule, R-0, Local Print                =================================================================    HPI    Patient information was obtained from: The patient    Use of Intrepreter: N/A        Glenroy Adler is a 44 year old male with pertinent medical history of anxiety, depression, autism, bipolar I disorder, who presents with urinary retention.    The patient has been expericning urinary symptoms for the last several days. He has the urinary urgency with dysuria, but still feels like he  is retaining fluid. He reports having an inguinal hernia down to his scrotum and notes it has gotten larger but denies any testicular pain. He contacted nurse triage yesterday and was told to be seen in the ER but he decided to wait until today to see if his symptoms would resolve. No other medical complaints at this time.      PAST MEDICAL HISTORY:  Past Medical History:   Diagnosis Date    Anxiety     Autism     Depressive disorder     Essential tremor        PAST SURGICAL HISTORY:  No past surgical history on file.    CURRENT MEDICATIONS:    Prior to Admission Medications   Prescriptions Last Dose Informant Patient Reported? Taking?   Baclofen (LIORESAL) 5 MG tablet  Self No No   Sig: Take 0.5 tablets (2.5 mg) by mouth 3 times daily as needed for other (anxiety)   MELATONIN PO  Self Yes No   Sig: Take 3 mg by mouth   Vitamin D3 (CHOLECALCIFEROL) 25 mcg (1000 units) tablet  Self No No   Sig: Take 2 tablets (50 mcg) by mouth daily   acetaminophen (TYLENOL) 500 MG tablet   Yes No   Sig: Take 500-1,000 mg by mouth every 6 hours as needed for mild pain   magnesium oxide (MAG-OX) 400 MG tablet  Self Yes No   Sig: Take 400 mg by mouth daily   meclizine (ANTIVERT) 25 MG tablet   No No   Sig: Take 1 tablet (25 mg) by mouth 3 times daily as needed for dizziness   melatonin 5 MG tablet  Self No No   Sig: Take 3 tablets (15 mg) by mouth nightly as needed for sleep   Patient taking differently: Take 10 mg by mouth nightly as needed for sleep      Facility-Administered Medications: None       ALLERGIES:  Allergies   Allergen Reactions    Desvenlafaxine      Behavior issues due to being combined to other medication. Mother reports. Patient was very unstable emotional. Once it was taken away their was Improvement.    Dust Mites     Metamucil [Fiber]     Smoke.        FAMILY HISTORY:  No family history on file.    SOCIAL HISTORY:  Social History     Tobacco Use    Smoking status: Never    Smokeless tobacco: Never   Substance Use  "Topics    Alcohol use: Never    Drug use: Never        VITALS:  Patient Vitals for the past 24 hrs:   BP Temp Temp src Pulse Resp SpO2 Height Weight   06/27/24 0113 (!) 173/95 -- -- 106 -- 96 % -- --   06/27/24 0047 (!) 173/101 98.6  F (37  C) Oral 103 22 95 % 1.778 m (5' 10\") 129.3 kg (285 lb)       PHYSICAL EXAM    General Appearance: Well-appearing, disheveled, anxious with rapid speech  Head:  Normocephalic  Cardio:  Slightly tachycardic and regular rhythm  Pulm:  No respiratory distress  Back:  No CVA tenderness, normal ROM  Abdomen:  Obese, non-tender, non distended,no rebound or guarding. Bladder scan for 417 cc  Genitourinary: Left inguinal hernia down into left Hemiscrotum without erythema, no pain to testes or scrotum. The penis is enveloped by soft tissue. When soft tissue pushed back, there is notable smegma to the glans with odor. No open lesions or urethral discharge.   Extremities: Normal gait  Skin:  Skin warm, dry, no rashes  Neuro:  Alert and oriented ×3     RADIOLOGY/LABS:  Reviewed all pertinent imaging. Please see official radiology report. All pertinent labs reviewed and interpreted.    Results for orders placed or performed during the hospital encounter of 06/27/24   UA with Microscopic reflex to Culture    Specimen: Urine, Clean Catch   Result Value Ref Range    Color Urine Light Yellow Colorless, Straw, Light Yellow, Yellow    Appearance Urine Clear Clear    Glucose Urine Negative Negative mg/dL    Bilirubin Urine Negative Negative    Ketones Urine Negative Negative mg/dL    Specific Gravity Urine 1.026 1.001 - 1.030    Blood Urine Negative Negative    pH Urine 6.0 5.0 - 7.0    Protein Albumin Urine 30 (A) Negative mg/dL    Urobilinogen Urine <2.0 <2.0 mg/dL    Nitrite Urine Negative Negative    Leukocyte Esterase Urine Negative Negative    Mucus Urine Present (A) None Seen /LPF    RBC Urine <1 <=2 /HPF    WBC Urine 1 <=5 /HPF    Hyaline Casts Urine 1 <=2 /LPF       The creation of this " record is based on the scribe s observations of the work being performed by Dionna Sharma MD and the provider s statements to them. It was created on her behalf by Fredis Brewster, a trained medical scribe. This document has been checked and approved by the attending provider.    Dionna Sharma MD  Emergency Medicine  [facility] EMERGENCY DEPARTMENT  [address] [place] [id number] [phone]  Dept: [phone]     Dionna Sharma MD  06/27/24 0306

## 2024-06-27 NOTE — ED TRIAGE NOTES
Patient reports urinary retention starting this morning. Patient states he is on psych medication that causes the retention.      Triage Assessment (Adult)       Row Name 06/27/24 0050          Triage Assessment    Airway WDL WDL        Respiratory WDL    Respiratory WDL WDL        Cardiac WDL    Cardiac WDL WDL

## 2024-06-27 NOTE — ED NOTES
Bed: JNED-22  Expected date: 6/27/24  Expected time: 12:35 AM  Means of arrival: Ambulance  Comments:  44M abd pain, urinary retention, painful urination

## 2024-06-27 NOTE — DISCHARGE INSTRUCTIONS
Take the Flomax once daily as prescribed.  We sent a PSA level, the results of this are still pending, but will be available in your [facility] MyChart.  Follow-up with urology, PCP as discussed.

## 2024-07-15 ENCOUNTER — TELEPHONE (OUTPATIENT)
Dept: NURSING | Facility: CLINIC | Age: 44
End: 2024-07-15
Payer: MEDICARE

## 2024-07-15 NOTE — TELEPHONE ENCOUNTER
Patient calling in to request lab results from 6/27/24 visit to [facility] ED. Informed patient that PSA tumor maker and UA  are negative/within normal limits.     No treatment or change in treatment recommended per [facility] ED Lab Result  protocol.     Cindy Scott RN